# Patient Record
Sex: FEMALE | Race: WHITE | NOT HISPANIC OR LATINO | Employment: OTHER | URBAN - METROPOLITAN AREA
[De-identification: names, ages, dates, MRNs, and addresses within clinical notes are randomized per-mention and may not be internally consistent; named-entity substitution may affect disease eponyms.]

---

## 2018-06-20 ENCOUNTER — HOSPITAL ENCOUNTER (OUTPATIENT)
Dept: RADIOLOGY | Facility: HOSPITAL | Age: 73
Discharge: HOME | End: 2018-06-20
Attending: INTERNAL MEDICINE
Payer: MEDICARE

## 2018-06-20 DIAGNOSIS — S80.12XA CONTUSION OF LEFT LEG: ICD-10-CM

## 2018-06-20 DIAGNOSIS — S80.12XA CONTUSION OF LEFT LEG: Primary | ICD-10-CM

## 2018-06-20 PROCEDURE — 73590 X-RAY EXAM OF LOWER LEG: CPT | Mod: 26,LT,, | Performed by: RADIOLOGY

## 2018-06-20 PROCEDURE — 73590 X-RAY EXAM OF LOWER LEG: CPT | Mod: TC,PO,LT

## 2019-03-06 ENCOUNTER — HOSPITAL ENCOUNTER (EMERGENCY)
Facility: HOSPITAL | Age: 74
Discharge: HOME | End: 2019-03-06
Attending: EMERGENCY MEDICINE
Payer: MEDICARE

## 2019-03-06 VITALS
WEIGHT: 210.56 LBS | SYSTOLIC BLOOD PRESSURE: 139 MMHG | TEMPERATURE: 98 F | DIASTOLIC BLOOD PRESSURE: 72 MMHG | HEART RATE: 81 BPM | BODY MASS INDEX: 41.34 KG/M2 | OXYGEN SATURATION: 98 % | HEIGHT: 60 IN | RESPIRATION RATE: 20 BRPM

## 2019-03-06 DIAGNOSIS — J06.9 UPPER RESPIRATORY TRACT INFECTION, UNSPECIFIED TYPE: Primary | ICD-10-CM

## 2019-03-06 PROCEDURE — 99281 EMR DPT VST MAYX REQ PHY/QHP: CPT | Mod: ER

## 2019-03-06 NOTE — ED PROVIDER NOTES
"Encounter Date: 3/6/2019       History     Chief Complaint   Patient presents with    Cough     "I think I have bronchitis".  Coughing x past week, worse tonight.     The history is provided by the patient.   URI   The primary symptoms include sore throat and cough. Primary symptoms do not include fever, fatigue, headaches, ear pain, swollen glands, wheezing, abdominal pain, nausea, vomiting, myalgias, arthralgias or rash. The current episode started several days ago. This is a new problem. The problem has not changed since onset.  The sore throat began more than 2 days ago. The sore throat has been unchanged since its onset. The sore throat is not accompanied by trouble swallowing, drooling, hoarse voice or stridor. The sore throat pain is at a severity of 0/10.   The cough began 3 to 5 days ago. The cough is non-productive. There is nondescript sputum produced.   The onset of the illness is associated with exposure to sick contacts. Symptoms associated with the illness include congestion. The illness is not associated with chills, plugged ear sensation, facial pain, sinus pressure or rhinorrhea. The following treatments were addressed: Acetaminophen was not tried. A decongestant was not tried. Aspirin was not tried. NSAIDs were not tried. Risk factors for severe complications from URI include being elderly.     Review of patient's allergies indicates:  No Known Allergies  Past Medical History:   Diagnosis Date    Atrial flutter     Brain tumor (benign)     Cancer     Coronary artery disease     Diabetes mellitus     High cholesterol     Hypertension      Past Surgical History:   Procedure Laterality Date    ABDOMINAL SURGERY      APPENDECTOMY      BRAIN SURGERY       SECTION      HYSTERECTOMY      TONSILLECTOMY       History reviewed. No pertinent family history.  Social History     Tobacco Use    Smoking status: Never Smoker   Substance Use Topics    Alcohol use: No    Drug use: No "     Review of Systems   Constitutional: Negative for chills, fatigue and fever.   HENT: Positive for congestion and sore throat. Negative for drooling, ear pain, hoarse voice, rhinorrhea, sinus pressure and trouble swallowing.    Respiratory: Positive for cough. Negative for shortness of breath, wheezing and stridor.    Cardiovascular: Negative for chest pain.   Gastrointestinal: Negative for abdominal pain, nausea and vomiting.   Genitourinary: Negative for dysuria.   Musculoskeletal: Negative for arthralgias, back pain and myalgias.   Skin: Negative for rash.   Neurological: Negative for weakness and headaches.   Hematological: Does not bruise/bleed easily.   All other systems reviewed and are negative.      Physical Exam     Initial Vitals [03/06/19 0353]   BP Pulse Resp Temp SpO2   139/72 81 20 98.2 °F (36.8 °C) 98 %      MAP       --         Physical Exam    Nursing note and vitals reviewed.  Constitutional: Vital signs are normal. She appears well-developed and well-nourished. She is not diaphoretic.  Non-toxic appearance. She does not have a sickly appearance. She does not appear ill. No distress.   HENT:   Head: Normocephalic and atraumatic.   Right Ear: Hearing, tympanic membrane, external ear and ear canal normal.   Left Ear: Hearing, tympanic membrane, external ear and ear canal normal.   Nose: Mucosal edema present. Right sinus exhibits no maxillary sinus tenderness and no frontal sinus tenderness. Left sinus exhibits no maxillary sinus tenderness and no frontal sinus tenderness.   Mouth/Throat: Uvula is midline and mucous membranes are normal. Posterior oropharyngeal erythema present. No oropharyngeal exudate, posterior oropharyngeal edema or tonsillar abscesses.   Eyes: Conjunctivae and EOM are normal. Pupils are equal, round, and reactive to light.   Neck: Normal range of motion and full passive range of motion without pain. Neck supple. No thyromegaly present.   Cardiovascular: Normal rate, regular  rhythm, normal heart sounds and intact distal pulses. Exam reveals no gallop and no friction rub.    No murmur heard.  Pulmonary/Chest: Effort normal and breath sounds normal. No accessory muscle usage. No respiratory distress. She has no decreased breath sounds. She has no wheezes. She has no rhonchi. She exhibits no tenderness.   Abdominal: Soft. Normal appearance and bowel sounds are normal. She exhibits no distension. There is no tenderness. There is no rebound and no guarding. No hernia.   Musculoskeletal: Normal range of motion. She exhibits no edema or tenderness.   Lymphadenopathy:     She has no cervical adenopathy.   Neurological: She is alert and oriented to person, place, and time. She has normal strength. No cranial nerve deficit or sensory deficit. GCS eye subscore is 4. GCS verbal subscore is 5. GCS motor subscore is 6.   Skin: Skin is warm and dry. No rash noted.   Psychiatric: She has a normal mood and affect. Her behavior is normal. Judgment and thought content normal.         ED Course   Procedures  Labs Reviewed - No data to display       Imaging Results    None             Vitals:    03/06/19 0353 03/06/19 0357   BP: 139/72    Pulse: 81    Resp: 20    Temp: 98.2 °F (36.8 °C)    TempSrc: Oral    SpO2: 98%    Weight:  95.5 kg (210 lb 8.6 oz)   Height:  5' (1.524 m)           Imaging Results    None         Medications - No data to display      4:15 AM - Re-evaluation: The patient is resting comfortably and is in no acute distress. She states that her symptoms have improved after treatment within ER. Discussed test results, shared treatment plan, specific conditions for return, and importance of follow up with patient and family.  She understands and agrees with the plan as discussed. Answered  her questions at this time. She has remained hemodynamically stable throughout the ED course and is appropriate for discharge home.     Regarding UPPER RESPIRATORY ILLNESS, for treatment, I encouraged  patient to: drink plenty of fluids; get lots of rest; take medications as prescribed; use over-the-counter medications for symptomatic treatment;  and use a humidifier or steam in the bathroom to improve patency of upper airway.  Patient instructed to notify primary care provider, or return to the emergency department, if the they: have a cough most days or have a cough that returns frequently; begin coughing up blood; develop a high fever or shaking chills; have a low-grade fever for three or more days; develop thick, greenish mucus, especially if it has a bad smell; and experience shortness of breath or chest pain. For prevention, discussed with patient the importance of refraining from smoking (if applicable), getting annual influenza vaccines, reducing exposure to air pollution, and the need to frequently wash hands to avoid spread of infection.     Pre-hypertension/Hypertension: The pt has been informed that they may have pre-hypertension or hypertension based on a blood pressure reading in the ED. I recommend that the pt call the PCP listed on their discharge instructions or a physician of their choice this week to arrange f/u for further evaluation of possible pre-hypertension or hypertension.     Guillermina Person was given a handout which discussed their disease process, precautions, and instructions for follow-up and therapy.    Follow-up Information     Harvinder Sheikh MD. Schedule an appointment as soon as possible for a visit in 1 week.    Specialty:  Internal Medicine  Contact information:  [address]  PRIMARY CARE [facility] [id number] [phone]             [facility].    Specialty:  Emergency Medicine  Why:  As needed, If symptoms worsen  Contact information:  [address] [address] [address] [phone]                    Medication List      ASK your doctor about these medications    atenolol 50 MG tablet  Commonly known as:  TENORMIN      lisinopril-hydrochlorothiazide 20-12.5 mg per tablet  Commonly known as:  PRINZIDE,ZESTORETIC     metFORMIN 500 MG (MOD) 24 hr tablet  Commonly known as:  GLUMETZA     OMEGA 3-6-9 FATTY ACIDS 400-400-200 mg Cap  Generic drug:  fish-flx-prm-blkbor-om 3,6,9 5     propafenone 150 MG Tab  Commonly known as:  RHTHYMOL     simvastatin 20 MG tablet  Commonly known as:  ZOCOR           Current Discharge Medication List            ED Diagnosis  1. Upper respiratory tract infection, unspecified type                             Clinical Impression:       ICD-10-CM ICD-9-CM   1. Upper respiratory tract infection, unspecified type J06.9 465.9         Disposition:   Disposition: Discharged  Condition: Stable                        Hang Plata Jr., MD  03/06/19 0415

## 2019-03-06 NOTE — ED NOTES
"Patient verbally verified and Spelled Full Name and Date of Birth.  C/O occasional dry hacking cough since last week that has gotten worse since last night.  PMD is off on Wednesday & requesting to " be checked out"   Denies fever or chills, chest pain or SOB. LOC: The patient is awake, alert and aware of environment with an appropriate affect, the patient is oriented x 3 and speaking appropriately.  APPEARANCE: Patient resting comfortably and in no acute distress, patient is clean and well groomed, patient's clothing is properly fastened.  HEENT: Brief WNL  SKIN: Brief WNL, warm & dry   MUSCULOSKELETAL: Brief WNL  RESPIRATORY: Chest clear christine, resp easy, slight clear nasal drainage  CARDIAC: heartrate regular at 82/min, denies chest pain  GASTRO: Brief WNL, appetite as usual, deneis N,V or D  : Brief WNL except for occ stress incont. with coughing episodes  Peripheral Vasc: Brief WNL  NEURO: Brief WNL  PSYCH: Brief WNL  "

## 2019-05-20 ENCOUNTER — HOSPITAL ENCOUNTER (OUTPATIENT)
Dept: RADIOLOGY | Facility: HOSPITAL | Age: 74
Discharge: HOME | End: 2019-05-20
Attending: INTERNAL MEDICINE
Payer: MEDICARE

## 2019-05-20 DIAGNOSIS — M79.605 LEFT LEG PAIN: Primary | ICD-10-CM

## 2019-05-20 DIAGNOSIS — M79.605 LEFT LEG PAIN: ICD-10-CM

## 2019-05-21 ENCOUNTER — HOSPITAL ENCOUNTER (OUTPATIENT)
Dept: RADIOLOGY | Facility: HOSPITAL | Age: 74
Discharge: HOME | End: 2019-05-21
Attending: INTERNAL MEDICINE
Payer: MEDICARE

## 2019-05-21 PROCEDURE — 93971 EXTREMITY STUDY: CPT | Mod: TC,PO,LT

## 2019-05-21 PROCEDURE — 93971 US LOWER EXTREMITY VEINS LEFT: ICD-10-PCS | Mod: 26,LT,, | Performed by: RADIOLOGY

## 2019-05-21 PROCEDURE — 93971 EXTREMITY STUDY: CPT | Mod: 26,LT,, | Performed by: RADIOLOGY

## 2020-03-24 ENCOUNTER — HOSPITAL ENCOUNTER (EMERGENCY)
Facility: HOSPITAL | Age: 75
Discharge: HOME | End: 2020-03-24
Attending: EMERGENCY MEDICINE
Payer: MEDICARE

## 2020-03-24 VITALS
HEART RATE: 66 BPM | OXYGEN SATURATION: 96 % | DIASTOLIC BLOOD PRESSURE: 80 MMHG | TEMPERATURE: 99 F | WEIGHT: 203.06 LBS | BODY MASS INDEX: 39.65 KG/M2 | RESPIRATION RATE: 20 BRPM | SYSTOLIC BLOOD PRESSURE: 142 MMHG

## 2020-03-24 DIAGNOSIS — J40 BRONCHITIS: Primary | ICD-10-CM

## 2020-03-24 DIAGNOSIS — J20.9 ACUTE BRONCHITIS, UNSPECIFIED ORGANISM: ICD-10-CM

## 2020-03-24 DIAGNOSIS — R05.9 COUGH: ICD-10-CM

## 2020-03-24 PROCEDURE — 99284 EMERGENCY DEPT VISIT MOD MDM: CPT | Mod: 25,ER

## 2020-03-24 RX ORDER — ALBUTEROL SULFATE 90 UG/1
1-2 AEROSOL, METERED RESPIRATORY (INHALATION) EVERY 6 HOURS PRN
Qty: 8 G | Refills: 0 | Status: SHIPPED | OUTPATIENT
Start: 2020-03-24 | End: 2023-11-17 | Stop reason: SDUPTHER

## 2020-03-24 RX ORDER — AZITHROMYCIN 250 MG/1
250 TABLET, FILM COATED ORAL DAILY
Qty: 6 TABLET | Refills: 0 | Status: SHIPPED | OUTPATIENT
Start: 2020-03-24 | End: 2023-05-05 | Stop reason: CLARIF

## 2020-03-24 RX ORDER — METHYLPREDNISOLONE 4 MG/1
TABLET ORAL
Qty: 1 PACKAGE | Refills: 0 | Status: SHIPPED | OUTPATIENT
Start: 2020-03-24 | End: 2023-05-05 | Stop reason: CLARIF

## 2020-03-24 NOTE — DISCHARGE INSTRUCTIONS
Regarding BRONCHITIS, for treatment, I encouraged patient to refrain from smoking, drink plenty of fluids, rest, take medications as prescribed, and to use a humidifier or steam in the bathroom. Patient instructed to notify primary care provider if: they have a cough most days or have a cough that returns frequently; are coughing up blood; have a high fever or shaking chills; have a low-grade fever for three or more days; develop thick, greenish mucus, especially if it has a bad smell; and feel short of breath or have chest pain. For prevention, discussed with patient the importance of not smoking, getting annual influenza vaccines, reducing exposure to air pollution, and to frequently wash hands to avoid spread of infection.  Instructed patient to return to emergency department if they experience chest pain or shortness of breath and to follow up with primary care provider for re-evaluation.    Rest  Drink plenty of clear fluids   Nasal saline spray to clear nasal drainage and help with nasal congestion  Zyrtec or Claritin to help dry mucus and post nasal drip  Mucinex or Mucinex DM for cough and chest congestion  Tylenol or Ibuprofen for fever, headache and body aches  Warm salt water gargles for throat comfort  Chloraseptic spray or lozenges for throat comfort  RTC with no improvement or worsening

## 2020-03-24 NOTE — ED PROVIDER NOTES
Encounter Date: 3/24/2020       History     Chief Complaint   Patient presents with    Cough     The history is provided by the patient.   Cough   This is a recurrent problem. The problem has been unchanged. The cough is non-productive. There has been no fever. The fever has been present for 1 to 2 days. Pertinent negatives include no chest pain, no chills, no sweats, no weight loss, no ear congestion, no ear pain, no headaches, no rhinorrhea, no sore throat, no myalgias, no shortness of breath, no wheezing and no eye redness. She has tried nothing for the symptoms. The treatment provided no relief. She is not a smoker. Her past medical history is significant for bronchitis. Her past medical history does not include pneumonia, bronchiectasis, COPD, emphysema or asthma.     Review of patient's allergies indicates:  No Known Allergies  Past Medical History:   Diagnosis Date    Atrial flutter     Brain tumor (benign)     Cancer     Coronary artery disease     Diabetes mellitus     High cholesterol     Hypertension      Past Surgical History:   Procedure Laterality Date    ABDOMINAL SURGERY      APPENDECTOMY      BRAIN SURGERY       SECTION      HYSTERECTOMY      TONSILLECTOMY       No family history on file.  Social History     Tobacco Use    Smoking status: Never Smoker   Substance Use Topics    Alcohol use: No    Drug use: No     Review of Systems   Constitutional: Negative for chills, fever and weight loss.   HENT: Negative for ear pain, rhinorrhea and sore throat.    Eyes: Negative for redness.   Respiratory: Positive for cough. Negative for shortness of breath and wheezing.    Cardiovascular: Negative for chest pain.   Gastrointestinal: Negative for nausea.   Genitourinary: Negative for dysuria.   Musculoskeletal: Negative for back pain and myalgias.   Skin: Negative for rash.   Neurological: Negative for weakness and headaches.   Hematological: Does not bruise/bleed easily.   All other  systems reviewed and are negative.      Physical Exam     Initial Vitals [03/24/20 0752]   BP Pulse Resp Temp SpO2   (!) 142/80 66 20 98.5 °F (36.9 °C) 96 %      MAP       --         Physical Exam    Nursing note and vitals reviewed.  Constitutional: She appears well-developed and well-nourished.   HENT:   Head: Normocephalic and atraumatic.   Right Ear: Hearing, tympanic membrane, external ear and ear canal normal.   Left Ear: Hearing, tympanic membrane, external ear and ear canal normal.   Nose: Nose normal.   Mouth/Throat: Uvula is midline and mucous membranes are normal. No oropharyngeal exudate, posterior oropharyngeal edema or posterior oropharyngeal erythema.   Eyes: Conjunctivae and EOM are normal. Pupils are equal, round, and reactive to light.   Neck: Normal range of motion and full passive range of motion without pain. Neck supple. No thyromegaly present.   Cardiovascular: Normal rate, regular rhythm, normal heart sounds and intact distal pulses. Exam reveals no gallop and no friction rub.    No murmur heard.  Pulmonary/Chest: Effort normal and breath sounds normal. No respiratory distress. She has no decreased breath sounds. She has no wheezes. She has no rhonchi. She exhibits no tenderness.   CTA B. Dry cough. No wheezing or crackles.    Abdominal: Soft. Bowel sounds are normal. She exhibits no distension. There is no tenderness. There is no rigidity, no rebound, no guarding, no CVA tenderness, no tenderness at McBurney's point and negative Urrutia's sign. No hernia.   Musculoskeletal: Normal range of motion. She exhibits no edema or tenderness.   Lymphadenopathy:     She has no cervical adenopathy.   Neurological: She is alert and oriented to person, place, and time. She has normal strength. No cranial nerve deficit or sensory deficit. GCS eye subscore is 4. GCS verbal subscore is 5. GCS motor subscore is 6.   No focal neuro deficits   Skin: Skin is warm and dry. No rash noted.   Psychiatric: She has a  normal mood and affect. Her behavior is normal. Judgment and thought content normal.         ED Course   Procedures  Labs Reviewed - No data to display       Imaging Results          X-Ray Chest AP Portable (Final result)  Result time 03/24/20 08:22:18    Final result by JACEY Kong Sr., MD (03/24/20 08:22:18)                 Impression:      1. There is no evidence of an acute pulmonary process.  2. The size of the heart is prominent.  This may be secondary to magnification.  .      Electronically signed by: Jose Kong MD  Date:    03/24/2020  Time:    08:22             Narrative:    EXAMINATION:  XR CHEST AP PORTABLE    CLINICAL HISTORY:  Cough    COMPARISON:  11/29/2016    FINDINGS:  The size of the heart is prominent.  There is no evidence of an acute pulmonary process.  There is no pneumothorax.  The costophrenic angles are sharp.                                       Vitals:    03/24/20 0752   BP: (!) 142/80   Pulse: 66   Resp: 20   Temp: 98.5 °F (36.9 °C)   TempSrc: Oral   SpO2: 96%   Weight: 92.1 kg (203 lb 0.7 oz)       Results for orders placed or performed during the hospital encounter of 07/11/15   Rapid Complete Blood Count (CBC)   Result Value Ref Range    WBC 6.58 3.90 - 12.70 K/uL    RBC 4.87 4.00 - 5.40 M/uL    Hemoglobin 13.4 12.0 - 16.0 g/dL    Hematocrit 40.9 37.0 - 48.5 %    Mean Corpuscular Volume 84 82 - 98 fL    Mean Corpuscular Hemoglobin 27.5 27.0 - 31.0 pg    Mean Corpuscular Hemoglobin Conc 32.8 32.0 - 36.0 %    RDW 13.8 11.5 - 14.5 %    Platelets 243 150 - 350 K/uL    MPV 9.5 9.2 - 12.9 fL    Gran # (ANC) 4.4 1.8 - 7.7 K/uL    Lymph # 1.6 1.0 - 4.8 K/uL    Mono # 0.4 0.3 - 1.0 K/uL    Eos # 0.1 0.0 - 0.5 K/uL    Baso # 0.01 0.00 - 0.20 K/uL    Gran% 67.4 38.0 - 73.0 %    Lymph% 24.5 18.0 - 48.0 %    Mono% 6.1 4.0 - 15.0 %    Eosinophil% 1.8 0.0 - 8.0 %    Basophil% 0.2 0.0 - 1.9 %    Differential Method Automated    Rapid Basic Metabolic Panel (BMP)   Result Value Ref Range     Sodium 143 136 - 145 mmol/L    Potassium 3.9 3.5 - 5.1 mmol/L    Chloride 106 95 - 110 mmol/L    CO2 23 23 - 29 mmol/L    Glucose 139 (H) 70 - 110 mg/dL    BUN, Bld 11 8 - 23 mg/dL    Creatinine 0.7 0.5 - 1.4 mg/dL    Calcium 9.7 8.7 - 10.5 mg/dL    Anion Gap 14 8 - 16 mmol/L    eGFR if African American >60.0 >60 mL/min/1.73 m^2    eGFR if non African American >60.0 >60 mL/min/1.73 m^2   APTT   Result Value Ref Range    aPTT 25.5 21.0 - 32.0 sec   Protime-INR   Result Value Ref Range    Prothrombin Time 10.2 9.0 - 12.5 sec    INR 1.0 0.8 - 1.2         Imaging Results          X-Ray Chest AP Portable (Final result)  Result time 03/24/20 08:22:18    Final result by JACEY Kong Sr., MD (03/24/20 08:22:18)                 Impression:      1. There is no evidence of an acute pulmonary process.  2. The size of the heart is prominent.  This may be secondary to magnification.  .      Electronically signed by: Jose Kong MD  Date:    03/24/2020  Time:    08:22             Narrative:    EXAMINATION:  XR CHEST AP PORTABLE    CLINICAL HISTORY:  Cough    COMPARISON:  11/29/2016    FINDINGS:  The size of the heart is prominent.  There is no evidence of an acute pulmonary process.  There is no pneumothorax.  The costophrenic angles are sharp.                                Medications - No data to display    9:03 AM - Re-evaluation: The patient is resting comfortably and is in no acute distress. .covid She states that her symptoms have improved after treatment within ER. Discussed test results, shared treatment plan, specific conditions for return, and importance of follow up with patient and family.  Currently pt is asymptomatic for any viral dz/symptoms but advised isolation precautions and to return if symptoms persist or worsen or call PCP for further recommendations.  She understands and agrees with the plan as discussed. Answered  her questions at this time. She has remained hemodynamically stable throughout the ED  course and is appropriate for discharge home.     [place] [facility]  Preventing the Spread of Coronavirus Disease 2019 in Homes and Residential Communities      Prevention steps for people with confirmed or suspected COVID-19 (including persons under investigation) who do not need to be hospitalized and people with confirmed COVID-19 who were hospitalized and determined to be medically stable to go home.    Your healthcare provider and public health staff will evaluate whether you can be cared for at home.   Stay home except to get medical care.   Separate yourself from other people and animals in your home   Call ahead before visiting your doctor.   Wear a facemask.   Cover your coughs and sneezes.   Clean your hands often.   Avoid sharing personal household items.   Clean all high-touch surfaces every day.   Monitor your symptoms. Seek prompt medical attention if your illness is worsening (e.g., difficulty breathing). Before seeking care, call your healthcare provider.   If you have a medical emergency and need to call 911, notify the dispatch personnel that you have, or are being evaluated for COVID-19. If possible, put on a facemask before emergency medical services arrive.   Discontinuing home isolation. Call your provider about guidance to discontinue home isolation.    Recommended precautions for household members, intimate partners, and caregivers in a nonhealthcare setting of a patient with symptomatic laboratory-confirmed COVID-19 or a patient under investigation.  Household members, intimate partners, and caregivers in a nonhealthcare setting may have close contact with a person with symptomatic, laboratory-confirmed COVID-19 or a person under investigation. Close contacts should monitor their health; they should call their healthcare provider right away if they develop symptoms suggestive of COVID-19 (e.g., fever, cough, shortness of breath).    Close contacts  should also follow these recommendations:   Make sure that you understand and can help the patient follow their healthcare provider's instructions for medication(s) and care. You should help the patient with basic needs in the home and provide support for getting groceries, prescriptions, and other personal needs.   Monitor the patient's symptoms. If the patient is getting sicker, call his or her healthcare provider and tell them that the patient has laboratory-confirmed COVID-19. This will help the healthcare provider's office take steps to keep other people in the office or waiting room from getting infected. Ask the healthcare provider to call the local or [facility] health department for additional guidance. If the patient has a medical emergency and you need to call 911, notify the dispatch personnel that the patient has, or is being evaluated for COVID-19.   Household members should stay in another room or be  from the patient as much as possible. Household members should use a separate bedroom and bathroom, if available.   Prohibit visitors who do not have an essential need to be in the home.   Household members should care for any pets in the home. Do not handle pets or other animals while sick.   Make sure that shared spaces in the home have good air flow, such as by an air conditioner or an opened window, weather permitting.   Perform hand hygiene frequently. Wash your hands often with soap and water for at least 20 seconds or use an alcohol-based hand  that contains 60 to 95% alcohol, covering all surfaces of your hands and rubbing them together until they feel dry. Soap and water should be used preferentially if hands are visibly dirty.   Avoid touching your eyes, nose, and mouth with unwashed hands.   The patient should wear a facemask when you are around other people. If the patient is not able to wear a facemask (for example, because it causes trouble breathing), you, as the  caregiver should wear a mask when you are in the same room as the patient.   Wear a disposable facemask and gloves when you touch or have contact with the patient's blood, stool, or body fluids, such as saliva, sputum, nasal mucus, vomit, urine.  o Throw out disposable facemasks and gloves after using them. Do not reuse.  o When removing personal protective equipment, first remove and dispose of gloves. Then, immediately clean your hands with soap and water or alcohol-based hand . Next, remove and dispose of facemask, and immediately clean your hands again with soap and water or alcohol-based hand .   Avoid sharing household items with the patient. You should not share dishes, drinking glasses, cups, eating utensils, towels, bedding, or other items. After the patient uses these items, you should wash them thoroughly (see below Wash laundry thoroughly).   Clean all high-touch surfaces, such as counters, tabletops, doorknobs, bathroom fixtures, toilets, phones, keyboards, tablets, and bedside tables, every day. Also, clean any surfaces that may have blood, stool, or body fluids on them.   Use a household cleaning spray or wipe, according to the label instructions. Labels contain instructions for safe and effective use of the cleaning product including precautions you should take when applying the product, such as wearing gloves and making sure you have good ventilation during use of the product.   Wash laundry thoroughly.  o Immediately remove and wash clothes or bedding that have blood, stool, or body fluids on them.  o Wear disposable gloves while handling soiled items and keep soiled items away from your body. Clean your hands (with soap and water or an alcohol-based hand ) immediately after removing your gloves.  o Read and follow directions on labels of laundry or clothing items and detergent. In general, using a normal laundry detergent according to washing machine  instructions and dry thoroughly using the warmest temperatures recommended on the clothing label.   Place all used disposable gloves, facemasks, and other contaminated items in a lined container before disposing of them with other household waste. Clean your hands (with soap and water or an alcohol-based hand ) immediately after handling these items. Soap and water should be used preferentially if hands are visibly dirty.   Discuss any additional questions with your state or local health department or healthcare provider. Check available hours when contacting your local health department.    For more information see CDC link below.      https://www.cdc.gov/coronavirus/2019-ncov/hcp/guidance-prevent-spread.html#precautions               Regarding BRONCHITIS, for treatment, I encouraged patient to refrain from smoking, drink plenty of fluids, rest, take medications as prescribed, and to use a humidifier or steam in the bathroom. Patient instructed to notify primary care provider if: they have a cough most days or have a cough that returns frequently; are coughing up blood; have a high fever or shaking chills; have a low-grade fever for three or more days; develop thick, greenish mucus, especially if it has a bad smell; and feel short of breath or have chest pain. For prevention, discussed with patient the importance of not smoking, getting annual influenza vaccines, reducing exposure to air pollution, and to frequently wash hands to avoid spread of infection.  Instructed patient to return to emergency department if they experience chest pain or shortness of breath and to follow up with primary care provider for re-evaluation.    Pre-hypertension/Hypertension: The pt has been informed that they may have pre-hypertension or hypertension based on a blood pressure reading in the ED. I recommend that the pt call the PCP listed on their discharge instructions or a physician of their choice this week to arrange  f/u for further evaluation of possible pre-hypertension or hypertension.     Guillermina Person was given a handout which discussed their disease process, precautions, and instructions for follow-up and therapy.    Follow-up Information     Davis Herrera MD. Schedule an appointment as soon as possible for a visit in 1 week.    Specialty:  Internal Medicine  Contact information:  [address] [facility]  [address]  [place] [address]             [facility]    Specialty:  Emergency Medicine  Why:  As needed, If symptoms worsen  Contact information:  [address]  [place] [address] [phone]                    Medication List      START taking these medications    albuterol 90 mcg/actuation inhaler  Commonly known as:  PROVENTIL/VENTOLIN HFA  Inhale 1-2 puffs into the lungs every 6 (six) hours as needed for Wheezing. Rescue     azithromycin 250 MG tablet  Commonly known as:  Z-BLANQUITA  Take 1 tablet (250 mg total) by mouth once daily. Take first 2 tablets together, then 1 every day until finished.     methylPREDNISolone 4 mg tablet  Commonly known as:  MEDROL DOSEPACK  use as directed        ASK your doctor about these medications    atenoloL 50 MG tablet  Commonly known as:  TENORMIN     lisinopriL-hydrochlorothiazide 20-12.5 mg per tablet  Commonly known as:  PRINZIDE,ZESTORETIC     metFORMIN 500 MG (MOD) 24hr tablet  Commonly known as:  GLUMETZA     OMEGA 3-6-9 FATTY ACIDS 400-400-200 mg Cap  Generic drug:  fish-flx-prm-blkbor-om 3,6,9 5     propafenone 150 MG Tab  Commonly known as:  RHTHYMOL     simvastatin 20 MG tablet  Commonly known as:  ZOCOR           Where to Get Your Medications      You can get these medications from any pharmacy    Bring a paper prescription for each of these medications  · albuterol 90 mcg/actuation inhaler  · azithromycin 250 MG tablet  · methylPREDNISolone 4 mg tablet        Discharge Medication List as of 3/24/2020  8:45 AM      START taking these  medications    Details   albuterol (PROVENTIL/VENTOLIN HFA) 90 mcg/actuation inhaler Inhale 1-2 puffs into the lungs every 6 (six) hours as needed for Wheezing. Rescue, Starting Tue 3/24/2020, Print      azithromycin (Z-BLANQUITA) 250 MG tablet Take 1 tablet (250 mg total) by mouth once daily. Take first 2 tablets together, then 1 every day until finished., Starting Tue 3/24/2020, Print      methylPREDNISolone (MEDROL DOSEPACK) 4 mg tablet use as directed, Print               ED Diagnosis  1. Bronchitis    2. Cough    3. Acute bronchitis, unspecified organism                                     Clinical Impression:       ICD-10-CM ICD-9-CM   1. Bronchitis J40 490   2. Cough R05 786.2   3. Acute bronchitis, unspecified organism J20.9 466.0         Disposition:   Disposition: Discharged  Condition: Stable     ED Disposition Condition    Discharge Stable        ED Prescriptions     Medication Sig Dispense Start Date End Date Auth. Provider    azithromycin (Z-BLANQUITA) 250 MG tablet Take 1 tablet (250 mg total) by mouth once daily. Take first 2 tablets together, then 1 every day until finished. 6 tablet 3/24/2020  Hang Plata Jr., MD    methylPREDNISolone (MEDROL DOSEPACK) 4 mg tablet use as directed 1 Package 3/24/2020  Hang Plata Jr., MD    albuterol (PROVENTIL/VENTOLIN HFA) 90 mcg/actuation inhaler Inhale 1-2 puffs into the lungs every 6 (six) hours as needed for Wheezing. Rescue 8 g 3/24/2020  Hang Plata Jr., MD        Follow-up Information     Follow up With Specialties Details Why Contact Info    Davis Herrera MD Internal Medicine Schedule an appointment as soon as possible for a visit in 1 week  [id number] [place] [address]  [address]  [place] [address]      [facility] Emergency Medicine  As needed, If symptoms worsen [address]  [place] [address] [phone]                                     Hang Plata Jr., MD  03/28/20 4014

## 2021-01-03 ENCOUNTER — HOSPITAL ENCOUNTER (EMERGENCY)
Facility: HOSPITAL | Age: 76
Discharge: HOME | End: 2021-01-03
Attending: EMERGENCY MEDICINE
Payer: MEDICARE

## 2021-01-03 VITALS
TEMPERATURE: 99 F | SYSTOLIC BLOOD PRESSURE: 147 MMHG | DIASTOLIC BLOOD PRESSURE: 75 MMHG | RESPIRATION RATE: 22 BRPM | HEART RATE: 84 BPM | BODY MASS INDEX: 37.82 KG/M2 | WEIGHT: 193.69 LBS | OXYGEN SATURATION: 95 %

## 2021-01-03 DIAGNOSIS — U07.1 COVID-19 VIRUS INFECTION: Primary | ICD-10-CM

## 2021-01-03 LAB
CTP QC/QA: YES
SARS-COV-2 RDRP RESP QL NAA+PROBE: POSITIVE

## 2021-01-03 PROCEDURE — U0002 COVID-19 LAB TEST NON-CDC: HCPCS | Mod: ER | Performed by: EMERGENCY MEDICINE

## 2021-01-03 PROCEDURE — 99282 EMERGENCY DEPT VISIT SF MDM: CPT | Mod: ER

## 2021-01-04 ENCOUNTER — PES CALL (OUTPATIENT)
Dept: ADMINISTRATIVE | Facility: CLINIC | Age: 76
End: 2021-01-04

## 2021-01-06 ENCOUNTER — TELEPHONE (OUTPATIENT)
Dept: ADMINISTRATIVE | Facility: CLINIC | Age: 76
End: 2021-01-06

## 2021-01-06 ENCOUNTER — NURSE TRIAGE (OUTPATIENT)
Dept: ADMINISTRATIVE | Facility: CLINIC | Age: 76
End: 2021-01-06

## 2021-01-06 ENCOUNTER — INFUSION (OUTPATIENT)
Dept: INFECTIOUS DISEASES | Facility: HOSPITAL | Age: 76
End: 2021-01-06
Attending: EMERGENCY MEDICINE
Payer: MEDICARE

## 2021-01-06 VITALS
HEART RATE: 48 BPM | RESPIRATION RATE: 18 BRPM | SYSTOLIC BLOOD PRESSURE: 131 MMHG | OXYGEN SATURATION: 95 % | DIASTOLIC BLOOD PRESSURE: 60 MMHG | TEMPERATURE: 97 F

## 2021-01-06 DIAGNOSIS — U07.1 COVID-19 VIRUS INFECTION: ICD-10-CM

## 2021-01-06 PROCEDURE — M0239 BAMLANIVIMAB-XXXX INFUSION: HCPCS | Performed by: INTERNAL MEDICINE

## 2021-01-06 PROCEDURE — 63600175 PHARM REV CODE 636 W HCPCS: Performed by: INTERNAL MEDICINE

## 2021-01-06 PROCEDURE — 25000003 PHARM REV CODE 250: Performed by: INTERNAL MEDICINE

## 2021-01-06 RX ORDER — DIPHENHYDRAMINE HYDROCHLORIDE 50 MG/ML
25 INJECTION INTRAMUSCULAR; INTRAVENOUS ONCE AS NEEDED
Status: DISCONTINUED | OUTPATIENT
Start: 2021-01-06 | End: 2023-05-31

## 2021-01-06 RX ORDER — EPINEPHRINE 0.1 MG/ML
0.3 INJECTION INTRAVENOUS
Status: DISCONTINUED | OUTPATIENT
Start: 2021-01-06 | End: 2023-05-31

## 2021-01-06 RX ORDER — SODIUM CHLORIDE 0.9 % (FLUSH) 0.9 %
10 SYRINGE (ML) INJECTION
Status: DISCONTINUED | OUTPATIENT
Start: 2021-01-06 | End: 2023-05-31

## 2021-01-06 RX ORDER — ONDANSETRON 4 MG/1
4 TABLET, ORALLY DISINTEGRATING ORAL ONCE AS NEEDED
Status: DISCONTINUED | OUTPATIENT
Start: 2021-01-06 | End: 2023-05-31

## 2021-01-06 RX ORDER — ALBUTEROL SULFATE 90 UG/1
2 AEROSOL, METERED RESPIRATORY (INHALATION)
Status: DISCONTINUED | OUTPATIENT
Start: 2021-01-06 | End: 2023-05-31

## 2021-01-06 RX ORDER — ACETAMINOPHEN 325 MG/1
650 TABLET ORAL ONCE AS NEEDED
Status: DISCONTINUED | OUTPATIENT
Start: 2021-01-06 | End: 2023-05-31

## 2021-01-06 RX ORDER — AMIODARONE HYDROCHLORIDE 200 MG/1
100 TABLET ORAL DAILY
COMMUNITY
Start: 2020-11-10 | End: 2023-05-31

## 2021-01-06 RX ADMIN — SODIUM CHLORIDE 700 MG: 0.9 INJECTION, SOLUTION INTRAVENOUS at 09:01

## 2022-08-01 ENCOUNTER — HOSPITAL ENCOUNTER (EMERGENCY)
Facility: HOSPITAL | Age: 77
Discharge: HOME | End: 2022-08-01
Attending: EMERGENCY MEDICINE
Payer: MEDICARE

## 2022-08-01 VITALS
SYSTOLIC BLOOD PRESSURE: 104 MMHG | WEIGHT: 139.13 LBS | HEART RATE: 57 BPM | RESPIRATION RATE: 20 BRPM | OXYGEN SATURATION: 98 % | BODY MASS INDEX: 27.17 KG/M2 | TEMPERATURE: 98 F | DIASTOLIC BLOOD PRESSURE: 58 MMHG

## 2022-08-01 DIAGNOSIS — R05.9 COUGH: ICD-10-CM

## 2022-08-01 DIAGNOSIS — U07.1 COVID-19 VIRUS INFECTION: Primary | ICD-10-CM

## 2022-08-01 LAB
BASOPHILS # BLD AUTO: 0.02 K/UL (ref 0–0.2)
BASOPHILS NFR BLD: 0.5 % (ref 0–1.9)
DIFFERENTIAL METHOD: NORMAL
EOSINOPHIL # BLD AUTO: 0 K/UL (ref 0–0.5)
EOSINOPHIL NFR BLD: 0.5 % (ref 0–8)
ERYTHROCYTE [DISTWIDTH] IN BLOOD BY AUTOMATED COUNT: 13.3 % (ref 11.5–14.5)
HCT VFR BLD AUTO: 43.8 % (ref 37–48.5)
HGB BLD-MCNC: 14.6 G/DL (ref 12–16)
IMM GRANULOCYTES # BLD AUTO: 0.01 K/UL (ref 0–0.04)
IMM GRANULOCYTES NFR BLD AUTO: 0.2 % (ref 0–0.5)
LYMPHOCYTES # BLD AUTO: 1.4 K/UL (ref 1–4.8)
LYMPHOCYTES NFR BLD: 32.8 % (ref 18–48)
MCH RBC QN AUTO: 28.7 PG (ref 27–31)
MCHC RBC AUTO-ENTMCNC: 33.3 G/DL (ref 32–36)
MCV RBC AUTO: 86 FL (ref 82–98)
MONOCYTES # BLD AUTO: 0.4 K/UL (ref 0.3–1)
MONOCYTES NFR BLD: 10.2 % (ref 4–15)
NEUTROPHILS # BLD AUTO: 2.3 K/UL (ref 1.8–7.7)
NEUTROPHILS NFR BLD: 55.8 % (ref 38–73)
NRBC BLD-RTO: 0 /100 WBC
PLATELET # BLD AUTO: 242 K/UL (ref 150–450)
PMV BLD AUTO: 10.4 FL (ref 9.2–12.9)
RBC # BLD AUTO: 5.09 M/UL (ref 4–5.4)
WBC # BLD AUTO: 4.12 K/UL (ref 3.9–12.7)

## 2022-08-01 PROCEDURE — 85025 COMPLETE CBC W/AUTO DIFF WBC: CPT | Mod: ER | Performed by: EMERGENCY MEDICINE

## 2022-08-01 PROCEDURE — 99284 EMERGENCY DEPT VISIT MOD MDM: CPT | Mod: 25,ER

## 2022-08-01 PROCEDURE — 25000003 PHARM REV CODE 250: Mod: ER | Performed by: EMERGENCY MEDICINE

## 2022-08-01 PROCEDURE — 96360 HYDRATION IV INFUSION INIT: CPT | Mod: ER

## 2022-08-01 RX ADMIN — SODIUM CHLORIDE 1000 ML: 0.9 INJECTION, SOLUTION INTRAVENOUS at 10:08

## 2022-08-01 NOTE — ED PROVIDER NOTES
Encounter Date: 2022       History     Chief Complaint   Patient presents with    COVID-19 Concerns     Covid + x1 week, repots feeling weak     The history is provided by the patient.   Cough  This is a new problem. The current episode started several days ago. The problem occurs constantly. The problem has been unchanged. The cough is productive of sputum. Associated symptoms include chills and sore throat. Pertinent negatives include no chest pain, no myalgias and no shortness of breath.     Review of patient's allergies indicates:  No Known Allergies  Past Medical History:   Diagnosis Date    Atrial flutter     Brain tumor (benign)     Cancer     Coronary artery disease     Diabetes mellitus     High cholesterol     Hypertension      Past Surgical History:   Procedure Laterality Date    ABDOMINAL SURGERY      APPENDECTOMY      BRAIN SURGERY       SECTION      HYSTERECTOMY      TONSILLECTOMY       No family history on file.  Social History     Tobacco Use    Smoking status: Never Smoker   Substance Use Topics    Alcohol use: No    Drug use: No     Review of Systems   Constitutional: Positive for chills. Negative for fever.   HENT: Positive for sore throat.    Respiratory: Positive for cough. Negative for shortness of breath.    Cardiovascular: Negative for chest pain.   Gastrointestinal: Negative for nausea.   Genitourinary: Negative for dysuria.   Musculoskeletal: Negative for back pain and myalgias.   Skin: Negative for rash.   Neurological: Negative for weakness.   Hematological: Does not bruise/bleed easily.       Physical Exam     Initial Vitals [22 1007]   BP Pulse Resp Temp SpO2   (!) 91/52 65 18 98 °F (36.7 °C) 98 %      MAP       --         Physical Exam    Nursing note and vitals reviewed.  Constitutional: She appears well-developed and well-nourished. No distress.   HENT:   Head: Normocephalic and atraumatic.   Mouth/Throat: Oropharynx is clear and moist.   Eyes:  Conjunctivae and EOM are normal. Pupils are equal, round, and reactive to light.   Neck: Neck supple.   Normal range of motion.  Cardiovascular: Normal rate, regular rhythm and normal heart sounds. Exam reveals no gallop and no friction rub.    No murmur heard.  Pulmonary/Chest: Breath sounds normal. No respiratory distress. She has no wheezes. She has no rhonchi. She has no rales.   Abdominal: Abdomen is soft. Bowel sounds are normal. She exhibits no distension and no mass. There is no abdominal tenderness. There is no rebound and no guarding.   Musculoskeletal:         General: No tenderness or edema. Normal range of motion.      Cervical back: Normal range of motion and neck supple.     Neurological: She is alert and oriented to person, place, and time. She has normal strength.   Skin: Skin is warm and dry. No rash noted.   Psychiatric: She has a normal mood and affect. Thought content normal.         ED Course   Procedures  Labs Reviewed   CBC W/ AUTO DIFFERENTIAL   COMPREHENSIVE METABOLIC PANEL   URINALYSIS, REFLEX TO URINE CULTURE   COMPREHENSIVE METABOLIC PANEL          Imaging Results          X-Ray Chest AP Portable (Final result)  Result time 08/01/22 10:33:43    Final result by Neida Roa MD (08/01/22 10:33:43)                 Impression:      No acute cardiopulmonary abnormality.      Electronically signed by: Neida Roa  Date:    08/01/2022  Time:    10:33             Narrative:    EXAMINATION:  XR CHEST AP PORTABLE    CLINICAL HISTORY:  Cough, unspecified    TECHNIQUE:  Single frontal view of the chest was performed.    COMPARISON:  Chest radiograph 03/24/2020    FINDINGS:  Calcified granulomas noted within the upper right and lateral lower right lung.The lungs are otherwise clear, with normal appearance of pulmonary vasculature and no pleural effusion or pneumothorax.    The cardiac silhouette is prominent but stable in size.  The hilar and mediastinal contours are  unremarkable.    No acute osseous abnormality noting degenerative change of the right acromioclavicular joint.  There is degenerative change of the spine.                                 Medications   sodium chloride 0.9% bolus 1,000 mL (0 mLs Intravenous Stopped 8/1/22 1150)                          Clinical Impression:   Final diagnoses:  [R05.9] Cough  [U07.1] COVID-19 virus infection (Primary)          ED Disposition Condition    Discharge Stable        ED Prescriptions     None        Follow-up Information     Follow up With Specialties Details Why Contact Info    Davis Herrera MD Internal Medicine   [id number] [place] [id number]             Papo Kaiser MD  08/01/22 [id number]

## 2023-03-22 ENCOUNTER — HOSPITAL ENCOUNTER (EMERGENCY)
Facility: HOSPITAL | Age: 78
Discharge: HOME | End: 2023-03-22
Attending: EMERGENCY MEDICINE
Payer: MEDICARE

## 2023-03-22 VITALS
RESPIRATION RATE: 16 BRPM | DIASTOLIC BLOOD PRESSURE: 85 MMHG | BODY MASS INDEX: 33.53 KG/M2 | SYSTOLIC BLOOD PRESSURE: 192 MMHG | WEIGHT: 166 LBS | OXYGEN SATURATION: 99 % | TEMPERATURE: 98 F | HEART RATE: 63 BPM

## 2023-03-22 DIAGNOSIS — I10 ESSENTIAL HYPERTENSION: ICD-10-CM

## 2023-03-22 DIAGNOSIS — R04.0 EPISTAXIS: Primary | ICD-10-CM

## 2023-03-22 PROCEDURE — 99282 EMERGENCY DEPT VISIT SF MDM: CPT | Mod: ER

## 2023-03-22 NOTE — ED PROVIDER NOTES
Encounter Date: 3/22/2023       History     Chief Complaint   Patient presents with    Epistaxis     Right nare nosebleed, onset yesterday, intermittent, on Eliquis      Patient is a 69-year-old female who presents today with complaints of intermittent epistaxis from the right nostril.  Denies any bleeding from left side.  Denies any active bleeding at this time.  Bleeding has been intermittent since yesterday.  Patient is on Eliquis for atrial fibrillation.  Patient denies any lightheadedness, near-syncope, syncope, chest pain, dyspnea, and all other symptoms.  Denies any trauma.  No prior evaluation.  No prior treatment.    Review of patient's allergies indicates:  No Known Allergies  Past Medical History:   Diagnosis Date    Atrial flutter     Brain tumor (benign)     Cancer     Coronary artery disease     Diabetes mellitus     High cholesterol     Hypertension      Past Surgical History:   Procedure Laterality Date    ABDOMINAL SURGERY      APPENDECTOMY      BRAIN SURGERY       SECTION      HYSTERECTOMY      TONSILLECTOMY       No family history on file.  Social History     Tobacco Use    Smoking status: Never   Substance Use Topics    Alcohol use: No    Drug use: No     Review of Systems   HENT:  Positive for nosebleeds (currently resolved).      Physical Exam     Initial Vitals [23 0811]   BP Pulse Resp Temp SpO2   (!) 192/85 63 16 98 °F (36.7 °C) 99 %      MAP       --         Physical Exam    Nursing note and vitals reviewed.  Constitutional: She appears well-developed and well-nourished. No distress.   HENT:   Head: Normocephalic and atraumatic.   Dried blood seen in the right nostril.  None in the left nostril.  No active bleeding   Eyes: Conjunctivae and EOM are normal. Pupils are equal, round, and reactive to light.   Neck: Neck supple. No tracheal deviation present.   Cardiovascular:  Normal rate.           Pulmonary/Chest: No respiratory distress.   Abdominal: She exhibits no distension  and no mass. There is no guarding.   Musculoskeletal:         General: No tenderness or edema. Normal range of motion.      Cervical back: Neck supple.     Neurological: She is alert and oriented to person, place, and time. GCS score is 15. GCS eye subscore is 4. GCS verbal subscore is 5. GCS motor subscore is 6.   No focal deficits   Skin: Skin is warm. No rash noted. No erythema.   Psychiatric: She has a normal mood and affect. Her behavior is normal.       ED Course   Procedures  Labs Reviewed - No data to display       Imaging Results    None          Medications - No data to display  Medical Decision Making:   History:   Old Records Summarized: other records.       <> Summary of Records: Medication list reviewed.  Eliquis is 1 of her medications.  Labs reviewed.  No history of thrombocytopenia  Initial Assessment:   Intermittent epistaxis.  No active bleeding at this time  ED Management:  Discussed epistaxis prevention and home management.  Discharged home in asymptomatic condition with ER return precautions and follow-up instructions                        Clinical Impression:   Final diagnoses:  [R04.0] Epistaxis (Primary)  [I10] Essential hypertension        ED Disposition Condition    Discharge Stable          ED Prescriptions    None       Follow-up Information       Follow up With Specialties Details Why Contact Info    Davis Herrera MD Internal Medicine Call   [id number] [facility]  [address]  [place] [address]      [facility] - Emergency Dept Emergency Medicine  As needed, If symptoms worsen [address]  [place] [id number] [phone]             Bruno Pascal MD  03/22/23 7767

## 2023-05-05 ENCOUNTER — HOSPITAL ENCOUNTER (EMERGENCY)
Facility: HOSPITAL | Age: 78
Discharge: HOME | End: 2023-05-05
Attending: EMERGENCY MEDICINE
Payer: MEDICARE

## 2023-05-05 VITALS
BODY MASS INDEX: 31.12 KG/M2 | RESPIRATION RATE: 18 BRPM | SYSTOLIC BLOOD PRESSURE: 142 MMHG | HEART RATE: 60 BPM | TEMPERATURE: 98 F | DIASTOLIC BLOOD PRESSURE: 70 MMHG | WEIGHT: 154.13 LBS | OXYGEN SATURATION: 96 %

## 2023-05-05 DIAGNOSIS — T50.905A FIXED DRUG REACTION: Primary | ICD-10-CM

## 2023-05-05 PROCEDURE — 96372 THER/PROPH/DIAG INJ SC/IM: CPT | Performed by: EMERGENCY MEDICINE

## 2023-05-05 PROCEDURE — 63600175 PHARM REV CODE 636 W HCPCS: Mod: ER | Performed by: EMERGENCY MEDICINE

## 2023-05-05 PROCEDURE — 99284 EMERGENCY DEPT VISIT MOD MDM: CPT | Mod: ER

## 2023-05-05 RX ORDER — LORATADINE 10 MG
1 TABLET,DISINTEGRATING ORAL DAILY PRN
COMMUNITY

## 2023-05-05 RX ORDER — METHYLPREDNISOLONE SOD SUCC 125 MG
125 VIAL (EA) INJECTION
Status: COMPLETED | OUTPATIENT
Start: 2023-05-05 | End: 2023-05-05

## 2023-05-05 RX ORDER — PREDNISONE 20 MG/1
40 TABLET ORAL DAILY
Qty: 10 TABLET | Refills: 0 | Status: SHIPPED | OUTPATIENT
Start: 2023-05-05 | End: 2023-05-10

## 2023-05-05 RX ADMIN — METHYLPREDNISOLONE SODIUM SUCCINATE 125 MG: 125 INJECTION, POWDER, FOR SOLUTION INTRAMUSCULAR; INTRAVENOUS at 10:05

## 2023-05-05 NOTE — ED PROVIDER NOTES
Emergency Medicine Provider Note - 2023        History     Chief Complaint   Patient presents with    Allergic Reaction     Was put on amoxicillin 1 week ago, red rash noted to neck and very upper chest area, getting worse since yesterday; did not take last 2 doses because pt started having neck rash since Thursday.          History of Present Illness   HPI    2023, 10:36 AM  The history is provided by the patient    Guillermina Person is a 78 y.o. female presenting to the ED for rash.  Patient had a head cold on 2023.  She was prescribed Augmentin 875 mg.  She reports that she had been compliant with her medications up into the last 3 tablets.  She notes she started having a rash to the anterior part of her neck and broke out between her legs.  This happened yesterday.  Patient had tried applying Bactroban cream without improvement.  Patient denies any chest pain, chest pressure, shortness of breath, lip swelling, tongue swelling, I involvement, current lesions in the mouth, difficulty urinating.  Nothing makes it better, nothing makes      Arrival mode:  It worse.      PCP: Davis Herrera MD     Allergies:  Review of patient's allergies indicates:   Allergen Reactions    Augmentin [amoxicillin-pot clavulanate]      Fixed drug reaction      Amoxicillin Itching, Rash and Other (See Comments)     Yeast infection       Past Medical History:  Past Medical History:   Diagnosis Date    Atrial flutter     Brain tumor (benign)     Cancer     Coronary artery disease     Diabetes mellitus     High cholesterol     Hypertension        Past Surgical History:  Past Surgical History:   Procedure Laterality Date    ABDOMINAL SURGERY      APPENDECTOMY      BRAIN SURGERY       SECTION      HYSTERECTOMY      TONSILLECTOMY           Family History:  No family history on file.    Social History:  Social History     Tobacco Use    Smoking status: Never    Smokeless tobacco: Not on file   Substance and Sexual  Activity    Alcohol use: No    Drug use: No    Sexual activity: Not on file       Triage note, Allergies, Past Medical History, Past Surgical History, Family History and Social History reviewed as documented above.     Review of Systems   Review of Systems   HENT:  Negative for trouble swallowing.         (-) Lip swelling (-) tongue swelling   Eyes:  Negative for redness.   Respiratory:  Negative for shortness of breath.    Gastrointestinal:  Negative for nausea and vomiting.   Genitourinary:  Negative for difficulty urinating and dysuria.   Skin:  Positive for rash.        Physical Exam     Initial Vitals   BP Pulse Resp Temp SpO2   05/05/23 0923 05/05/23 0923 05/05/23 0923 05/05/23 0919 05/05/23 0923   (!) 142/70 60 18 97.9 °F (36.6 °C) 96 %      MAP       --                 Physical Exam    Nursing Notes and Vital Signs Reviewed.  Constitutional: Patient is in . Well-developed and well-nourished.  Head: Atraumatic. Normocephalic.  Eyes: PERRL. EOM intact. Conjunctivae are not pale. No scleral icterus.  No periorbital edema noted.  No lesions to the eye.  No conjunctival injection.  ENT: Mucous membranes are moist. Oropharynx is clear and symmetric.  There is no tongue swelling, lip swelling.  Neck: Supple. Full ROM. No lymphadenopathy.  Cardiovascular: Regular rate. Regular rhythm. No murmurs, rubs, or gallops. Distal pulses are 2+ and symmetric.  Pulmonary/Chest: No respiratory distress. Clear to auscultation bilaterally. No wheezing or rales.  Musculoskeletal: Moves all extremities. No obvious deformities. No edema. No calf tenderness.  Skin: Warm and dry.  There is uniform redness to the anterior part of the neck.  There is also redness to the labia majora.  No target lesions.  No ulcers noted to perineal region.  Neurological:  Alert, awake, and appropriate.  Normal speech.  No acute focal neurological deficits are appreciated.  Psychiatric: Normal affect. Good eye contact. Appropriate in content.            ED Course     ED Procedures:  Procedures    ED Vital Signs:  Vitals:    05/05/23 0919 05/05/23 0923   BP:  (!) 142/70   Pulse:  60   Resp:  18   Temp: 97.9 °F (36.6 °C)    SpO2:  96%   Weight: 69.9 kg (154 lb 1.6 oz)        Abnormal Lab Results:  Labs Reviewed - No data to display     All Lab Results:  none        Imaging Results:  Imaging Results    None               The Emergency Provider reviewed the vital signs and test results, which are outlined above.     ED Discussion     ED Course as of 05/05/23 1429   Fri May 05, 2023   1017 Discussed risk benefits of prednisone.  Patient agreeable for prednisone treatment.  Discussed fixed drug reaction.  Discussed possible progression to Coles Oli's/TEN [LB]      ED Course User Index  [LB] Vee Llanos,             10:40 AM  Reassessment: Dr. Llanos reassessed the pt.  The pt is resting comfortably and is NAD.  Pt states their sx have improved. Discussed test results, shared treatment plan, specific conditions for return, and the need for f/u.  Answered their questions at this time.  Pt understands and agrees to the plan.  The pt has remained hemodynamically stable through ED course and is stable for discharge.      I discussed with patient and/or family/caretaker that evaluation in the ED does not suggest any emergent or life threatening medical conditions requiring immediate intervention beyond what was provided in the ED, and I believe patient is safe for discharge.  Regardless, an unremarkable evaluation in the ED does not preclude the development or presence of a serious of life threatening condition. As such, patient was instructed to return immediately for any worsening or change in current symptoms.    Patient is safe for discharge. There is no suggestion of airway or ENT emergency. Patient is hemodynamically stable and there is no suggestion of active anaphylaxis or progressive worsening of current symptoms.      ED Medication(s):  Medications  "  methylPREDNISolone sodium succinate injection 125 mg (125 mg Intramuscular Given 5/5/23 1024)         Medical Decision Making   Medical Decision Making  Recent treatment with Augmentin for head cold.  Development of uniform rash to the anterior neck.  Redness to perineum.    Differential diagnosis:  Fixed drug reaction, allergic reaction, toxic epidermal necrolysis, Chriss Oli's    Patient was educated on spectrum of allergic reactions.  This does not appear to be compatible with hives.  More likely fixed drug reaction.  Risk benefits of prednisone were discussed with patient in detail.  Patient elected to pursue prednisone therapy.  Patient was advised to avoid Augmentin in the future.  We also discussed need for re-evaluation should there develop any tongue swelling, lip swelling, difficulty breathing, I involvement, ulcers, a rapid progression of rash.  Patient verbalized understanding.          Discussed case with:N/A         MIPS Measures     Smoker? No     Hypertension: History of Hypertension: The patient has elevated blood pressure (higher than 120/80) while being treated in the ED but has a history of hypertension.      Portions of this note may have been created with voice recognition software. Occasional "wrong-word" or "sound-a-like" substitutions may have occurred due to the inherent limitations of voice recognition software. Please, read the note carefully and recognize, using context, where substitutions have occurred.            Clinical Impression       ICD-10-CM ICD-9-CM   1. Fixed drug reaction  T50.905A 995.20         ED Disposition       Disposition: Discharge to home  Patient condition: Stable    Medication List     Medication List        START taking these medications      predniSONE 20 MG tablet  Commonly known as: DELTASONE  Take 2 tablets (40 mg total) by mouth once daily. for 5 days            ASK your doctor about these medications      albuterol 90 mcg/actuation inhaler  Commonly " known as: PROVENTIL/VENTOLIN HFA  Inhale 1-2 puffs into the lungs every 6 (six) hours as needed for Wheezing. Rescue     amiodarone 200 MG Tab  Commonly known as: PACERONE     apixaban 5 mg Tab  Commonly known as: ELIQUIS     atenoloL 50 MG tablet  Commonly known as: TENORMIN     fish-flx-prm-blkbor-om 3,6,9 5 400-400-200 mg Cap     lisinopriL-hydrochlorothiazide 20-12.5 mg per tablet  Commonly known as: PRINZIDE,ZESTORETIC     loratadine 10 mg dissolvable tablet  Commonly known as: CLARITIN REDITABS     metFORMIN 500 MG (MOD) 24hr tablet  Commonly known as: GLUMETZA     propafenone 150 MG Tab  Commonly known as: RHTHYMOL     pulse oximeter device  Commonly known as: pulse oximeter  Use twice daily at 8 AM and 3 PM and record the value in [employer] as directed.     simvastatin 20 MG tablet  Commonly known as: ZOCOR               Where to Get Your Medications        These medications were sent to [facility] - [place] - [address]  [address] [place] [place] [address]      Phone: [phone]   predniSONE 20 MG tablet         ED Follow-up   Follow-up Information       Davis Herrera MD In 2 days.    Specialty: Internal Medicine  Why: No Augmentin.  You are allergic to it.  Return to emergency department for:  Mouth lesions, lip lesions, difficulty breathing, difficulty swallowing, shortness of breath, unable to urinate, I lesions, or other concerns  Contact information:  [address] [facility]  [address]  [place] [address]                                      Vee Llanos,   05/05/23 7963

## 2023-05-29 DIAGNOSIS — I48.0 PAROXYSMAL ATRIAL FIBRILLATION: Primary | ICD-10-CM

## 2023-05-29 DIAGNOSIS — I10 ESSENTIAL HYPERTENSION: ICD-10-CM

## 2023-05-31 ENCOUNTER — HOSPITAL ENCOUNTER (OUTPATIENT)
Dept: RADIOLOGY | Facility: HOSPITAL | Age: 78
Discharge: HOME | End: 2023-05-31
Attending: SURGERY
Payer: MEDICARE

## 2023-05-31 ENCOUNTER — OFFICE VISIT (OUTPATIENT)
Dept: INTERNAL MEDICINE | Facility: CLINIC | Age: 78
End: 2023-05-31
Payer: MEDICARE

## 2023-05-31 VITALS
TEMPERATURE: 98 F | WEIGHT: 156.31 LBS | OXYGEN SATURATION: 95 % | BODY MASS INDEX: 31.51 KG/M2 | SYSTOLIC BLOOD PRESSURE: 136 MMHG | HEART RATE: 56 BPM | HEIGHT: 59 IN | DIASTOLIC BLOOD PRESSURE: 58 MMHG

## 2023-05-31 DIAGNOSIS — E11.69 TYPE 2 DIABETES MELLITUS WITH OTHER SPECIFIED COMPLICATION, WITHOUT LONG-TERM CURRENT USE OF INSULIN: ICD-10-CM

## 2023-05-31 DIAGNOSIS — Z76.89 ENCOUNTER TO ESTABLISH CARE: Primary | ICD-10-CM

## 2023-05-31 DIAGNOSIS — I10 ESSENTIAL HYPERTENSION: ICD-10-CM

## 2023-05-31 DIAGNOSIS — E66.09 CLASS 1 OBESITY DUE TO EXCESS CALORIES WITH SERIOUS COMORBIDITY AND BODY MASS INDEX (BMI) OF 31.0 TO 31.9 IN ADULT: ICD-10-CM

## 2023-05-31 DIAGNOSIS — Z78.0 POSTMENOPAUSAL: ICD-10-CM

## 2023-05-31 DIAGNOSIS — E78.2 MIXED HYPERLIPIDEMIA: ICD-10-CM

## 2023-05-31 DIAGNOSIS — I48.0 PAROXYSMAL ATRIAL FIBRILLATION: ICD-10-CM

## 2023-05-31 DIAGNOSIS — I44.7 LEFT BUNDLE BRANCH BLOCK: ICD-10-CM

## 2023-05-31 PROBLEM — E66.811 CLASS 1 OBESITY DUE TO EXCESS CALORIES WITH SERIOUS COMORBIDITY AND BODY MASS INDEX (BMI) OF 31.0 TO 31.9 IN ADULT: Status: ACTIVE | Noted: 2023-05-31

## 2023-05-31 PROBLEM — J06.9 UPPER RESPIRATORY TRACT INFECTION: Status: RESOLVED | Noted: 2019-03-06 | Resolved: 2023-05-31

## 2023-05-31 PROBLEM — J20.9 ACUTE BRONCHITIS: Status: RESOLVED | Noted: 2020-03-24 | Resolved: 2023-05-31

## 2023-05-31 PROCEDURE — 99204 PR OFFICE/OUTPT VISIT, NEW, LEVL IV, 45-59 MIN: ICD-10-PCS | Mod: S$GLB,,, | Performed by: NURSE PRACTITIONER

## 2023-05-31 PROCEDURE — 74177 CT ABD & PELVIS W/CONTRAST: CPT | Mod: 26,,, | Performed by: RADIOLOGY

## 2023-05-31 PROCEDURE — 99999 PR PBB SHADOW E&M-EST. PATIENT-LVL V: CPT | Mod: PBBFAC,,, | Performed by: NURSE PRACTITIONER

## 2023-05-31 PROCEDURE — 1101F PT FALLS ASSESS-DOCD LE1/YR: CPT | Mod: CPTII,S$GLB,, | Performed by: NURSE PRACTITIONER

## 2023-05-31 PROCEDURE — 1160F PR REVIEW ALL MEDS BY PRESCRIBER/CLIN PHARMACIST DOCUMENTED: ICD-10-PCS | Mod: CPTII,S$GLB,, | Performed by: NURSE PRACTITIONER

## 2023-05-31 PROCEDURE — 25500020 PHARM REV CODE 255: Mod: PO

## 2023-05-31 PROCEDURE — 3078F PR MOST RECENT DIASTOLIC BLOOD PRESSURE < 80 MM HG: ICD-10-PCS | Mod: CPTII,S$GLB,, | Performed by: NURSE PRACTITIONER

## 2023-05-31 PROCEDURE — 1126F AMNT PAIN NOTED NONE PRSNT: CPT | Mod: CPTII,S$GLB,, | Performed by: NURSE PRACTITIONER

## 2023-05-31 PROCEDURE — 3075F PR MOST RECENT SYSTOLIC BLOOD PRESS GE 130-139MM HG: ICD-10-PCS | Mod: CPTII,S$GLB,, | Performed by: NURSE PRACTITIONER

## 2023-05-31 PROCEDURE — 1126F PR PAIN SEVERITY QUANTIFIED, NO PAIN PRESENT: ICD-10-PCS | Mod: CPTII,S$GLB,, | Performed by: NURSE PRACTITIONER

## 2023-05-31 PROCEDURE — 3288F PR FALLS RISK ASSESSMENT DOCUMENTED: ICD-10-PCS | Mod: CPTII,S$GLB,, | Performed by: NURSE PRACTITIONER

## 2023-05-31 PROCEDURE — 3288F FALL RISK ASSESSMENT DOCD: CPT | Mod: CPTII,S$GLB,, | Performed by: NURSE PRACTITIONER

## 2023-05-31 PROCEDURE — 74177 CT ABD & PELVIS W/CONTRAST: CPT | Mod: TC,PO

## 2023-05-31 PROCEDURE — 1159F MED LIST DOCD IN RCRD: CPT | Mod: CPTII,S$GLB,, | Performed by: NURSE PRACTITIONER

## 2023-05-31 PROCEDURE — 1160F RVW MEDS BY RX/DR IN RCRD: CPT | Mod: CPTII,S$GLB,, | Performed by: NURSE PRACTITIONER

## 2023-05-31 PROCEDURE — 74177 CT ABDOMEN PELVIS WITH CONTRAST: ICD-10-PCS | Mod: 26,,, | Performed by: RADIOLOGY

## 2023-05-31 PROCEDURE — 1101F PR PT FALLS ASSESS DOC 0-1 FALLS W/OUT INJ PAST YR: ICD-10-PCS | Mod: CPTII,S$GLB,, | Performed by: NURSE PRACTITIONER

## 2023-05-31 PROCEDURE — 3075F SYST BP GE 130 - 139MM HG: CPT | Mod: CPTII,S$GLB,, | Performed by: NURSE PRACTITIONER

## 2023-05-31 PROCEDURE — 3078F DIAST BP <80 MM HG: CPT | Mod: CPTII,S$GLB,, | Performed by: NURSE PRACTITIONER

## 2023-05-31 PROCEDURE — 1159F PR MEDICATION LIST DOCUMENTED IN MEDICAL RECORD: ICD-10-PCS | Mod: CPTII,S$GLB,, | Performed by: NURSE PRACTITIONER

## 2023-05-31 PROCEDURE — 99999 PR PBB SHADOW E&M-EST. PATIENT-LVL V: ICD-10-PCS | Mod: PBBFAC,,, | Performed by: NURSE PRACTITIONER

## 2023-05-31 PROCEDURE — 99204 OFFICE O/P NEW MOD 45 MIN: CPT | Mod: S$GLB,,, | Performed by: NURSE PRACTITIONER

## 2023-05-31 RX ORDER — LANCETS 33 GAUGE
EACH MISCELLANEOUS
COMMUNITY
Start: 2022-09-01

## 2023-05-31 RX ORDER — FUROSEMIDE 40 MG/1
40 TABLET ORAL DAILY PRN
COMMUNITY
End: 2024-03-01 | Stop reason: SDUPTHER

## 2023-05-31 RX ORDER — INSULIN PUMP SYRINGE, 3 ML
EACH MISCELLANEOUS
COMMUNITY
Start: 2022-08-16

## 2023-05-31 RX ORDER — ATENOLOL 25 MG/1
1 TABLET ORAL DAILY
COMMUNITY
Start: 2022-06-17 | End: 2024-03-01 | Stop reason: SDUPTHER

## 2023-05-31 RX ORDER — AMIODARONE HYDROCHLORIDE 100 MG/1
1 TABLET ORAL DAILY
COMMUNITY
Start: 2023-04-25 | End: 2023-05-31

## 2023-05-31 RX ORDER — AMIODARONE HYDROCHLORIDE 100 MG/1
100 TABLET ORAL DAILY
COMMUNITY
End: 2023-07-26 | Stop reason: SDUPTHER

## 2023-05-31 RX ORDER — METFORMIN HYDROCHLORIDE 500 MG/1
500 TABLET ORAL DAILY
COMMUNITY
Start: 2023-05-13 | End: 2023-07-26 | Stop reason: SDUPTHER

## 2023-05-31 RX ADMIN — IOHEXOL 75 ML: 350 INJECTION, SOLUTION INTRAVENOUS at 11:05

## 2023-05-31 NOTE — ASSESSMENT & PLAN NOTE
Counseled on importance of diet and exercise. Encouraged patient to have an active lifestyle with regular exercise with healthy eating. Discussed the potential complications associated with obesity such as bone and joint pain, HTN, diabetes, and hyperlipidemia.

## 2023-05-31 NOTE — PROGRESS NOTES
Subjective:       Patient ID: Guillermina Person is a 78 y.o. female.    Chief Complaint: Establish Care    Mrs. Person presents to clinic with her  to establish care. Last seen by prior PCP on 23. History of DM, HLD, paroxysmal atrial fib, and HTN. Followed by Dr. Taylor, cardiology with upcoming appointment . She would like to establish care with cardiology at [facility]. Last labs completed in February with A1c 5.4, LDL 94, and GFR 58. She was seen by general surgery, Dr. Emerson on  for possible hernia recurrence.  CT abd/pelvis ordered. She has been unable to complete due to order issues.     Patient Active Problem List   Diagnosis    Essential hypertension    History of uterine cancer    Left bundle branch block    Paroxysmal atrial fibrillation    Primary osteoarthritis of both knees    Type 2 diabetes mellitus with other specified complication    Class 1 obesity due to excess calories with serious comorbidity and body mass index (BMI) of 31.0 to 31.9 in adult    Mixed hyperlipidemia       History reviewed. No pertinent family history.  Past Surgical History:   Procedure Laterality Date    ABDOMINAL SURGERY      APPENDECTOMY      BRAIN SURGERY       SECTION      HYSTERECTOMY      TONSILLECTOMY           Current Outpatient Medications:     albuterol (PROVENTIL/VENTOLIN HFA) 90 mcg/actuation inhaler, Inhale 1-2 puffs into the lungs every 6 (six) hours as needed for Wheezing. Rescue, Disp: 8 g, Rfl: 0    amiodarone (PACERONE) 100 MG Tab, Take 100 mg by mouth once daily., Disp: , Rfl:     apixaban (ELIQUIS) 5 mg Tab, Take 5 mg by mouth 2 (two) times daily., Disp: , Rfl:     atenoloL (TENORMIN) 25 MG tablet, Take 1 tablet by mouth once daily., Disp: , Rfl:     blood sugar diagnostic Strp, Test one time daily, Disp: , Rfl:     blood-glucose meter kit, USE AS DIRECTED, Disp: , Rfl:     fish-flx-prm-blkbor-om 3,6,9 5 400-400-200 mg Cap, Take 1 tablet by mouth once daily., Disp: , Rfl:      "furosemide (LASIX) 40 MG tablet, Take 40 mg by mouth daily as needed., Disp: , Rfl:     lancets 33 gauge Misc, Test one time daily, Disp: , Rfl:     lisinopril-hydrochlorothiazide (PRINZIDE,ZESTORETIC) 20-12.5 mg per tablet, Take 2 tablets by mouth once daily., Disp: , Rfl:     loratadine (CLARITIN REDITABS) 10 mg dissolvable tablet, Take 1 tablet by mouth daily as needed., Disp: , Rfl:     pulse oximeter (PULSE OXIMETER) device, Use twice daily at 8 AM and 3 PM and [facility] as directed., Disp: 1 each, Rfl: 0    simvastatin (ZOCOR) 20 MG tablet, Take 20 mg by mouth every evening., Disp: , Rfl:     metFORMIN (GLUCOPHAGE) 500 MG tablet, Take 500 mg by mouth once daily., Disp: , Rfl:   No current facility-administered medications for this visit.    Review of Systems   Constitutional:  Negative for chills, fatigue and fever.   Respiratory:  Negative for chest tightness, shortness of breath and wheezing.    Cardiovascular:  Positive for leg swelling. Negative for chest pain and palpitations.   Gastrointestinal:  Negative for abdominal pain, constipation, nausea and vomiting.   Genitourinary:  Negative for difficulty urinating.   Musculoskeletal:  Negative for myalgias.   Skin:  Negative for rash.   Neurological:  Negative for dizziness, syncope, weakness, light-headedness and headaches.   Psychiatric/Behavioral:  Negative for confusion and dysphoric mood. The patient is not nervous/anxious.      Objective:   BP (!) 136/58 (BP Location: Left arm, Patient Position: Sitting, BP Method: Medium (Manual))   Pulse (!) 56   Temp 97.9 °F (36.6 °C)   Ht 4' 11" (1.499 m)   Wt 70.9 kg (156 lb 4.9 oz)   SpO2 95%   BMI 31.57 kg/m²      Physical Exam  Constitutional:       General: She is not in acute distress.     Appearance: Normal appearance. She is not ill-appearing.   HENT:      Head: Normocephalic.   Eyes:      Extraocular Movements: Extraocular movements intact.      Conjunctiva/sclera: Conjunctivae " normal.   Cardiovascular:      Rate and Rhythm: Regular rhythm. Bradycardia present.      Heart sounds: Normal heart sounds. No murmur heard.  Pulmonary:      Effort: Pulmonary effort is normal. No respiratory distress.      Breath sounds: Normal breath sounds. No wheezing, rhonchi or rales.   Musculoskeletal:      Right lower leg: Edema (trace) present.      Left lower leg: Edema (trace) present.   Skin:     General: Skin is warm and dry.      Coloration: Skin is not pale.      Findings: No erythema or rash.   Neurological:      Mental Status: She is alert and oriented to person, place, and time.      Coordination: Coordination normal.      Gait: Gait normal.   Psychiatric:         Mood and Affect: Mood normal.         Behavior: Behavior normal.       Assessment & Plan     1. Encounter to establish care  Comments:  Reviewed previous PCP note, cardiology note, and labs. No concerning findings on exam. Continue current medications.    2. Type 2 diabetes mellitus with other specified complication, without long-term current use of insulin  Assessment & Plan:  Last A1c 5.4. Continue metformin.       3. Essential hypertension  Assessment & Plan:  Stable. BP well controlled today in clinic. Continue Lisinopril-HCTZ and atenolol.      4. Paroxysmal atrial fibrillation  Assessment & Plan:  Stable. Continue amiodarone, Eliquis, and atenolol. Followed by cardiology.    Orders:  -     Ambulatory referral/consult to Cardiology; Future; Expected date: 11/30/2023    5. Left bundle branch block  Assessment & Plan:  Stable. Continue current medications as prescribed. Followed by cardiology.      6. Mixed hyperlipidemia  Assessment & Plan:  Last LDL 94. Continue simvastatin.      7. Class 1 obesity due to excess calories with serious comorbidity and body mass index (BMI) of 31.0 to 31.9 in adult  Assessment & Plan:  Counseled on importance of diet and exercise. Encouraged patient to have an active lifestyle with regular exercise  "with healthy eating. Discussed the potential complications associated with obesity such as bone and joint pain, HTN, diabetes, and hyperlipidemia.        8. Postmenopausal  -     DXA Bone Density Axial Skeleton 1 or more sites; Future; Expected date: 05/31/2023        JAY JAY Yanez      Portions of this note may have been created with voice recognition software. Occasional "wrong-word" or "sound-a-like" substitutions may have occurred due to the inherent limitations of voice recognition software. Please, read the note carefully and recognize, using context, where substitutions have occurred.     "

## 2023-06-26 ENCOUNTER — TELEPHONE (OUTPATIENT)
Dept: INTERNAL MEDICINE | Facility: CLINIC | Age: 78
End: 2023-06-26
Payer: MEDICARE

## 2023-06-26 DIAGNOSIS — Z12.31 ENCOUNTER FOR SCREENING MAMMOGRAM FOR BREAST CANCER: Primary | ICD-10-CM

## 2023-07-10 ENCOUNTER — HOSPITAL ENCOUNTER (EMERGENCY)
Facility: HOSPITAL | Age: 78
Discharge: HOME | End: 2023-07-10
Attending: EMERGENCY MEDICINE
Payer: MEDICARE

## 2023-07-10 VITALS
OXYGEN SATURATION: 96 % | HEIGHT: 59 IN | SYSTOLIC BLOOD PRESSURE: 136 MMHG | TEMPERATURE: 98 F | WEIGHT: 156.94 LBS | BODY MASS INDEX: 31.64 KG/M2 | RESPIRATION RATE: 16 BRPM | HEART RATE: 64 BPM | DIASTOLIC BLOOD PRESSURE: 74 MMHG

## 2023-07-10 DIAGNOSIS — S00.83XA CONTUSION OF FACE, INITIAL ENCOUNTER: ICD-10-CM
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
## 2023-07-10 DIAGNOSIS — S62.115A CLOSED NONDISPLACED FRACTURE OF TRIQUETRUM OF LEFT WRIST, INITIAL ENCOUNTER: Primary | ICD-10-CM

## 2023-07-10 DIAGNOSIS — M25.532 LEFT WRIST PAIN: ICD-10-CM

## 2023-07-10 PROCEDURE — 29125 APPL SHORT ARM SPLINT STATIC: CPT | Mod: LT,ER

## 2023-07-10 PROCEDURE — 25000003 PHARM REV CODE 250: Mod: ER | Performed by: EMERGENCY MEDICINE

## 2023-07-10 PROCEDURE — 99284 EMERGENCY DEPT VISIT MOD MDM: CPT | Mod: 25,ER

## 2023-07-10 RX ORDER — HYDROCODONE BITARTRATE AND ACETAMINOPHEN 5; 325 MG/1; MG/1
1 TABLET ORAL
Status: COMPLETED | OUTPATIENT
Start: 2023-07-10 | End: 2023-07-10

## 2023-07-10 RX ORDER — HYDROCODONE BITARTRATE AND ACETAMINOPHEN 5; 325 MG/1; MG/1
1 TABLET ORAL EVERY 6 HOURS PRN
Qty: 12 TABLET | Refills: 0 | OUTPATIENT
Start: 2023-07-10 | End: 2023-09-24

## 2023-07-10 RX ORDER — ONDANSETRON 4 MG/1
4 TABLET, ORALLY DISINTEGRATING ORAL
Status: COMPLETED | OUTPATIENT
Start: 2023-07-10 | End: 2023-07-10

## 2023-07-10 RX ADMIN — HYDROCODONE BITARTRATE AND ACETAMINOPHEN 1 TABLET: 5; 325 TABLET ORAL at 06:07

## 2023-07-10 RX ADMIN — ONDANSETRON 4 MG: 4 TABLET, ORALLY DISINTEGRATING ORAL at 06:07

## 2023-07-10 NOTE — ED PROVIDER NOTES
Encounter Date: 7/10/2023       History     Chief Complaint   Patient presents with    Fall     Tripped and fell over a cement block yesterday around 2-3pm. Pain and bruising to chin. Complaining of left arm pain and swelling. Stated she had headaches and right ear pain but it is resolved at this time. Pt states she takes Eliquis 2x daily. Denies LOC     Patient is a 78-year-old female who presents today with complaints of acute onset left wrist pain after a mechanical ground level fall yesterday.  She states that she tripped over a cement block on the ground falling forward.  She states she hit her head, her face, in her left wrist.  She states she had a mild headache yesterday that is now resolved.  Mild chin pain with ecchymosis.  Her primary complaint is her left wrist pain.  Denies loss of consciousness, hip pain, back pain, neck pain, and all other symptoms.  Patient is on Eliquis.  Fall occurred approximately 15 hours ago      Review of patient's allergies indicates:   Allergen Reactions    Augmentin [amoxicillin-pot clavulanate]      Fixed drug reaction      Amoxicillin Itching, Rash and Other (See Comments)     Yeast infection     Past Medical History:   Diagnosis Date    Atrial flutter     Brain tumor (benign)     Cancer     Coronary artery disease     Diabetes mellitus     High cholesterol     Hypertension      Past Surgical History:   Procedure Laterality Date    ABDOMINAL SURGERY      APPENDECTOMY      BRAIN SURGERY       SECTION      HYSTERECTOMY      TONSILLECTOMY       No family history on file.  Social History     Tobacco Use    Smoking status: Never   Substance Use Topics    Alcohol use: No    Drug use: No     Review of Systems    Constitutional: No fevers, no fatigue  HENT: No headache, mild facial pain, no hearing loss  Eyes: No vision changes, no eye pain  Neck/Back: No neck pain, no back pain  Cardiovascular: No chest pain, no palpitations, no syncope  Respiratory: no SOB, no  cough  Abdominal: no abdominal pain, no N/V  Genitourinary: no pelvic pain, no genital pain  Musculoskeletal: Left hand pain/swelling  Neurological: No numbness, no paresthesias, no weakness, no LOC    Physical Exam     Initial Vitals   BP Pulse Resp Temp SpO2   07/10/23 0539 07/10/23 0539 07/10/23 0539 07/10/23 0543 07/10/23 0539   (!) 145/70 65 17 97.8 °F (36.6 °C) 95 %      MAP       --                Physical Exam    Constitutional: Awake, alert, NAD  HENT: normocephalic, chin ecchymosis, but no facial bone tenderness, no evidence of basilar skull fx  Eyes: PERRL, EOM, normal conjunctiva  Neck: Trachea midline, nontender, full ROM  Cardiovascular: RRR, 2+ palpable pulses in all 4 extremities  Pulmonary: Non-labored respirations, equal bilateral breath sounds, LCTAB  Chest Wall: No tenderness, no deformity  Abdominal: Soft, nontender, nondistended  Back: Nontender, no step-offs  Musculoskeletal:  Left wrist edema and tenderness with decreased range of motion of the left wrist.  No elbow tenderness or range of motion limitations  Neurological: AAO x4, GCS 15, maintaining airway and answering questions appropriately, no focal deficits  Skin:  No laceration      ED Course   Splint Application    Date/Time: 7/10/2023 7:56 AM  Performed by: Drew Fonseca MD  Authorized by: Bruno Pascal MD   Location details: left wrist  Splint type: volar short arm  Supplies used: Ortho-Glass  Post-procedure: The splinted body part was neurovascularly unchanged following the procedure.  Patient tolerance: Patient tolerated the procedure well with no immediate complications      Labs Reviewed - No data to display       Imaging Results              CT Head Without Contrast (Final result)  Result time 07/10/23 07:47:16      Final result by Harjit Leal III, MD (07/10/23 07:47:16)                   Impression:      No acute intracranial abnormality.  Additional findings as discussed.      Electronically signed  by: Harvinder Leal  Date:    07/10/2023  Time:    07:47               Narrative:    EXAMINATION:  CT HEAD WITHOUT CONTRAST    CLINICAL HISTORY:  Head trauma, minor (Age >= 65y);    TECHNIQUE:  Low dose axial images were obtained through the head.  Coronal and sagittal reformations were also performed. Contrast was not administered.    COMPARISON:  CT brain 07/11/2015    FINDINGS:  Interval right frontal craniotomy with overlying hardware.  No depressed skull fracture or skull lesions.  The mastoid air cells and paranasal sinuses are unremarkable.    Right frontal extra-axial mass has been resected with a small area of encephalomalacia within the right frontal lobe.    No intracranial mass, mass effect, hemorrhage, hydrocephalus, acute infarction or abnormal fluid collection.                                       X-Ray Wrist Complete Left (Final result)  Result time 07/10/23 07:18:44      Final result by Lambert Cobos MD (07/10/23 07:18:44)                   Impression:      Triquetral fracture.      Electronically signed by: Lambert Cobos  Date:    07/10/2023  Time:    07:18               Narrative:    EXAMINATION:  XR WRIST COMPLETE 3 VIEWS LEFT    CLINICAL HISTORY:  Pain in left wrist    TECHNIQUE:  PA, lateral, and oblique views of the left wrist were performed.    COMPARISON:  None    FINDINGS:  Triquetral fracture.  Scapholunate interval is maintained.  Scattered mild degenerative changes.  3 mm radial negative variance.  Soft tissue swelling.                                       Medications   HYDROcodone-acetaminophen 5-325 mg per tablet 1 tablet (1 tablet Oral Given 7/10/23 0605)   ondansetron disintegrating tablet 4 mg (4 mg Oral Given 7/10/23 0609)     Medical Decision Making:   Initial Assessment:   Concern for left wrist fracture.  X-ray ordered.  Given her age, head trauma, and use of Eliquis, patient will need a CT scan  Differential Diagnosis:   Intracranial hemorrhage, wrist  fracture/sprain  Clinical Tests:   Radiological Study: Ordered and Reviewed  ED Management:  6:00 AM: Care handed off to Dr. Fonseca  No Intracranial Bleed  Splint short arm  Prescription management  Orthopedic follow up                        Clinical Impression:   Final diagnoses:  [M25.532] Left wrist pain  [W19.XXXA] Fall, initial encounter  [S00.83XA] Contusion of face, initial encounter  [S62.115A] Closed nondisplaced fracture of triquetrum of left wrist, initial encounter (Primary)        ED Disposition Condition    Discharge Stable          ED Prescriptions       Medication Sig Dispense Start Date End Date Auth. Provider    HYDROcodone-acetaminophen (NORCO) 5-325 mg per tablet Take 1 tablet by mouth every 6 (six) hours as needed for Pain. 12 tablet 7/10/2023 -- Drew Fonseca MD          Follow-up Information       Follow up With Specialties Details Why Contact Info    [facility] Orthopedics In 3 days  [address]  [facility] [address] [phone]    Thomas Pimentel MD Internal Medicine, Family Medicine In 2 days  [address] [address] [id number] [phone]      [facility] - Emergency Dept Emergency Medicine  If symptoms worsen [address]  [facility] [address] [phone]             Drew Fonseca MD  07/10/23 5685

## 2023-07-27 RX ORDER — SIMVASTATIN 20 MG/1
20 TABLET, FILM COATED ORAL NIGHTLY
Qty: 90 TABLET | Refills: 1 | Status: SHIPPED | OUTPATIENT
Start: 2023-07-27 | End: 2024-01-23 | Stop reason: SDUPTHER

## 2023-07-27 RX ORDER — AMIODARONE HYDROCHLORIDE 100 MG/1
100 TABLET ORAL DAILY
Qty: 90 TABLET | Refills: 1 | Status: SHIPPED | OUTPATIENT
Start: 2023-07-27 | End: 2024-01-29 | Stop reason: SDUPTHER

## 2023-07-27 RX ORDER — METFORMIN HYDROCHLORIDE 500 MG/1
500 TABLET ORAL DAILY
Qty: 90 TABLET | Refills: 1 | Status: SHIPPED | OUTPATIENT
Start: 2023-07-27 | End: 2023-11-17 | Stop reason: SDUPTHER

## 2023-08-17 ENCOUNTER — TELEPHONE (OUTPATIENT)
Dept: INTERNAL MEDICINE | Facility: CLINIC | Age: 78
End: 2023-08-17
Payer: MEDICARE

## 2023-08-17 NOTE — TELEPHONE ENCOUNTER
----- Message from Alicia Parikh sent at 8/17/2023 12:03 PM CDT -----  Contact: Guillermina  Patient request to speak with a nurse regarding their body. Patient did not want to provide any further information.  Please call patient at [phone] to assist.    
Called patient, no answer, no vm  
left retinal detachment

## 2023-08-24 LAB
LEFT EYE DM RETINOPATHY: NEGATIVE
RIGHT EYE DM RETINOPATHY: NEGATIVE

## 2023-08-31 ENCOUNTER — OFFICE VISIT (OUTPATIENT)
Dept: INTERNAL MEDICINE | Facility: CLINIC | Age: 78
End: 2023-08-31
Payer: MEDICARE

## 2023-08-31 VITALS
BODY MASS INDEX: 30.76 KG/M2 | DIASTOLIC BLOOD PRESSURE: 70 MMHG | HEART RATE: 67 BPM | WEIGHT: 152.56 LBS | HEIGHT: 59 IN | OXYGEN SATURATION: 98 % | SYSTOLIC BLOOD PRESSURE: 130 MMHG | TEMPERATURE: 98 F

## 2023-08-31 DIAGNOSIS — I48.0 PAROXYSMAL ATRIAL FIBRILLATION: ICD-10-CM

## 2023-08-31 DIAGNOSIS — I10 ESSENTIAL HYPERTENSION: ICD-10-CM

## 2023-08-31 DIAGNOSIS — R30.0 DYSURIA: ICD-10-CM

## 2023-08-31 DIAGNOSIS — N30.01 ACUTE CYSTITIS WITH HEMATURIA: Primary | ICD-10-CM

## 2023-08-31 DIAGNOSIS — E11.69 TYPE 2 DIABETES MELLITUS WITH OTHER SPECIFIED COMPLICATION, WITHOUT LONG-TERM CURRENT USE OF INSULIN: ICD-10-CM

## 2023-08-31 LAB
BACTERIA #/AREA URNS AUTO: ABNORMAL /HPF
BILIRUB UR QL STRIP: NEGATIVE
CLARITY UR REFRACT.AUTO: ABNORMAL
COLOR UR AUTO: YELLOW
GLUCOSE UR QL STRIP: NEGATIVE
HGB UR QL STRIP: ABNORMAL
HYALINE CASTS UR QL AUTO: 1 /LPF
KETONES UR QL STRIP: ABNORMAL
LEUKOCYTE ESTERASE UR QL STRIP: ABNORMAL
MICROSCOPIC COMMENT: ABNORMAL
NITRITE UR QL STRIP: POSITIVE
PH UR STRIP: 6 [PH] (ref 5–8)
PROT UR QL STRIP: ABNORMAL
RBC #/AREA URNS AUTO: 10 /HPF (ref 0–4)
SP GR UR STRIP: 1.02 (ref 1–1.03)
URN SPEC COLLECT METH UR: ABNORMAL
UROBILINOGEN UR STRIP-ACNC: NEGATIVE EU/DL
WBC #/AREA URNS AUTO: 25 /HPF (ref 0–5)
WBC CLUMPS UR QL AUTO: ABNORMAL

## 2023-08-31 PROCEDURE — 1160F PR REVIEW ALL MEDS BY PRESCRIBER/CLIN PHARMACIST DOCUMENTED: ICD-10-PCS | Mod: CPTII,S$GLB,, | Performed by: NURSE PRACTITIONER

## 2023-08-31 PROCEDURE — 1160F RVW MEDS BY RX/DR IN RCRD: CPT | Mod: CPTII,S$GLB,, | Performed by: NURSE PRACTITIONER

## 2023-08-31 PROCEDURE — 3078F DIAST BP <80 MM HG: CPT | Mod: CPTII,S$GLB,, | Performed by: NURSE PRACTITIONER

## 2023-08-31 PROCEDURE — 1126F AMNT PAIN NOTED NONE PRSNT: CPT | Mod: CPTII,S$GLB,, | Performed by: NURSE PRACTITIONER

## 2023-08-31 PROCEDURE — 87086 URINE CULTURE/COLONY COUNT: CPT | Performed by: NURSE PRACTITIONER

## 2023-08-31 PROCEDURE — 87186 SC STD MICRODIL/AGAR DIL: CPT | Performed by: NURSE PRACTITIONER

## 2023-08-31 PROCEDURE — 99214 PR OFFICE/OUTPT VISIT, EST, LEVL IV, 30-39 MIN: ICD-10-PCS | Mod: S$GLB,,, | Performed by: NURSE PRACTITIONER

## 2023-08-31 PROCEDURE — 3288F FALL RISK ASSESSMENT DOCD: CPT | Mod: CPTII,S$GLB,, | Performed by: NURSE PRACTITIONER

## 2023-08-31 PROCEDURE — 3075F SYST BP GE 130 - 139MM HG: CPT | Mod: CPTII,S$GLB,, | Performed by: NURSE PRACTITIONER

## 2023-08-31 PROCEDURE — 99999 PR PBB SHADOW E&M-EST. PATIENT-LVL V: CPT | Mod: PBBFAC,,, | Performed by: NURSE PRACTITIONER

## 2023-08-31 PROCEDURE — 3288F PR FALLS RISK ASSESSMENT DOCUMENTED: ICD-10-PCS | Mod: CPTII,S$GLB,, | Performed by: NURSE PRACTITIONER

## 2023-08-31 PROCEDURE — 87088 URINE BACTERIA CULTURE: CPT | Performed by: NURSE PRACTITIONER

## 2023-08-31 PROCEDURE — 3075F PR MOST RECENT SYSTOLIC BLOOD PRESS GE 130-139MM HG: ICD-10-PCS | Mod: CPTII,S$GLB,, | Performed by: NURSE PRACTITIONER

## 2023-08-31 PROCEDURE — 1101F PR PT FALLS ASSESS DOC 0-1 FALLS W/OUT INJ PAST YR: ICD-10-PCS | Mod: CPTII,S$GLB,, | Performed by: NURSE PRACTITIONER

## 2023-08-31 PROCEDURE — 1159F MED LIST DOCD IN RCRD: CPT | Mod: CPTII,S$GLB,, | Performed by: NURSE PRACTITIONER

## 2023-08-31 PROCEDURE — 1101F PT FALLS ASSESS-DOCD LE1/YR: CPT | Mod: CPTII,S$GLB,, | Performed by: NURSE PRACTITIONER

## 2023-08-31 PROCEDURE — 87077 CULTURE AEROBIC IDENTIFY: CPT | Performed by: NURSE PRACTITIONER

## 2023-08-31 PROCEDURE — 3078F PR MOST RECENT DIASTOLIC BLOOD PRESSURE < 80 MM HG: ICD-10-PCS | Mod: CPTII,S$GLB,, | Performed by: NURSE PRACTITIONER

## 2023-08-31 PROCEDURE — 81000 URINALYSIS NONAUTO W/SCOPE: CPT | Mod: PO | Performed by: NURSE PRACTITIONER

## 2023-08-31 PROCEDURE — 1126F PR PAIN SEVERITY QUANTIFIED, NO PAIN PRESENT: ICD-10-PCS | Mod: CPTII,S$GLB,, | Performed by: NURSE PRACTITIONER

## 2023-08-31 PROCEDURE — 99999 PR PBB SHADOW E&M-EST. PATIENT-LVL V: ICD-10-PCS | Mod: PBBFAC,,, | Performed by: NURSE PRACTITIONER

## 2023-08-31 PROCEDURE — 1159F PR MEDICATION LIST DOCUMENTED IN MEDICAL RECORD: ICD-10-PCS | Mod: CPTII,S$GLB,, | Performed by: NURSE PRACTITIONER

## 2023-08-31 PROCEDURE — 99214 OFFICE O/P EST MOD 30 MIN: CPT | Mod: S$GLB,,, | Performed by: NURSE PRACTITIONER

## 2023-08-31 RX ORDER — NITROFURANTOIN 25; 75 MG/1; MG/1
100 CAPSULE ORAL 2 TIMES DAILY
Qty: 10 CAPSULE | Refills: 0 | Status: SHIPPED | OUTPATIENT
Start: 2023-08-31 | End: 2023-09-05

## 2023-08-31 NOTE — PROGRESS NOTES
Subjective:       Patient ID: Guillermina Person is a 78 y.o. female.    Chief Complaint: Urinary Tract Infection    Mrs. Person presents to clinic for possible UTI. Started with pelvic discomfort  then developed dysuria, hesitancy, urgency, and frequency. Experienced left flank pain today for 1-2 hours. Taking AZO without relief.    Urinary Tract Infection   This is a new problem. The current episode started in the past 7 days. The problem occurs every urination. The pain is moderate. There has been no fever. Associated symptoms include chills, flank pain (left flank pain for 1-2 hours), frequency, hesitancy and urgency. Pertinent negatives include no behavior changes, discharge, hematuria, nausea, possible pregnancy, vomiting or rash. Treatments tried: AZO.       Patient Active Problem List   Diagnosis    Essential hypertension    History of uterine cancer    Left bundle branch block    Paroxysmal atrial fibrillation    Primary osteoarthritis of both knees    Type 2 diabetes mellitus with other specified complication    Class 1 obesity due to excess calories with serious comorbidity and body mass index (BMI) of 31.0 to 31.9 in adult    Mixed hyperlipidemia       History reviewed. No pertinent family history.  Past Surgical History:   Procedure Laterality Date    ABDOMINAL SURGERY      APPENDECTOMY      BRAIN SURGERY       SECTION      HYSTERECTOMY      TONSILLECTOMY           Current Outpatient Medications:     albuterol (PROVENTIL/VENTOLIN HFA) 90 mcg/actuation inhaler, Inhale 1-2 puffs into the lungs every 6 (six) hours as needed for Wheezing. Rescue, Disp: 8 g, Rfl: 0    amiodarone (PACERONE) 100 MG Tab, Take 1 tablet (100 mg total) by mouth once daily., Disp: 90 tablet, Rfl: 1    apixaban (ELIQUIS) 5 mg Tab, Take 5 mg by mouth 2 (two) times daily., Disp: , Rfl:     atenoloL (TENORMIN) 25 MG tablet, Take 1 tablet by mouth once daily., Disp: , Rfl:     blood sugar diagnostic Strp, Test one time daily,  Disp: , Rfl:     blood-glucose meter kit, USE AS DIRECTED, Disp: , Rfl:     fish-flx-prm-blkbor-om 3,6,9 5 400-400-200 mg Cap, Take 1 tablet by mouth once daily., Disp: , Rfl:     furosemide (LASIX) 40 MG tablet, Take 40 mg by mouth daily as needed., Disp: , Rfl:     HYDROcodone-acetaminophen (NORCO) 5-325 mg per tablet, Take 1 tablet by mouth every 6 (six) hours as needed for Pain., Disp: 12 tablet, Rfl: 0    lancets 33 gauge Misc, Test one time daily, Disp: , Rfl:     lisinopril-hydrochlorothiazide (PRINZIDE,ZESTORETIC) 20-12.5 mg per tablet, Take 2 tablets by mouth once daily., Disp: , Rfl:     loratadine (CLARITIN REDITABS) 10 mg dissolvable tablet, Take 1 tablet by mouth daily as needed., Disp: , Rfl:     metFORMIN (GLUCOPHAGE) 500 MG tablet, Take 1 tablet (500 mg total) by mouth once daily., Disp: 90 tablet, Rfl: 1    pulse oximeter (PULSE OXIMETER) device, Use twice daily at 8 AM and 3 PM and [facility]., Disp: 1 each, Rfl: 0    simvastatin (ZOCOR) 20 MG tablet, Take 1 tablet (20 mg total) by mouth every evening., Disp: 90 tablet, Rfl: 1    nitrofurantoin, macrocrystal-monohydrate, (MACROBID) 100 MG capsule, Take 1 capsule (100 mg total) by mouth 2 (two) times daily. for 5 days, Disp: 10 capsule, Rfl: 0    Review of Systems   Constitutional:  Positive for chills. Negative for fatigue and fever.   Gastrointestinal:  Negative for nausea and vomiting.   Genitourinary:  Positive for difficulty urinating, dysuria, flank pain (left flank pain for 1-2 hours), frequency, hesitancy, pelvic pain and urgency. Negative for decreased urine volume, hematuria and vaginal discharge.   Musculoskeletal:  Negative for myalgias.   Skin:  Negative for rash.   Neurological:  Negative for weakness.   Psychiatric/Behavioral:  Negative for confusion.        Objective:   /70 (BP Location: Left arm, Patient Position: Sitting, BP Method: Large (Manual))   Pulse 67   Temp 97.9 °F (36.6 °C)   Ht 4'  "11" (1.499 m)   Wt 69.2 kg (152 lb 8.9 oz)   SpO2 98%   BMI 30.81 kg/m²      Physical Exam  Constitutional:       General: She is not in acute distress.     Appearance: Normal appearance. She is not ill-appearing.   Cardiovascular:      Rate and Rhythm: Normal rate and regular rhythm.      Heart sounds: Normal heart sounds.   Pulmonary:      Effort: Pulmonary effort is normal. No respiratory distress.      Breath sounds: Normal breath sounds. No wheezing, rhonchi or rales.   Abdominal:      General: Bowel sounds are normal. There is no distension.      Palpations: There is no mass.      Tenderness: There is abdominal tenderness in the right lower quadrant, suprapubic area and left lower quadrant. There is no right CVA tenderness, left CVA tenderness, guarding or rebound.   Neurological:      Mental Status: She is alert and oriented to person, place, and time.   Psychiatric:         Behavior: Behavior normal.         Assessment & Plan     Results for orders placed or performed in visit on 08/31/23   Urinalysis, Reflex to Urine Culture Urine, Clean Catch    Specimen: Urine   Result Value Ref Range    Specimen UA Urine, Clean Catch     Color, UA Yellow Yellow, Straw, Jen    Appearance, UA Cloudy (A) Clear    pH, UA 6.0 5.0 - 8.0    Specific Gravity, UA 1.025 1.005 - 1.030    Protein, UA 1+ (A) Negative    Glucose, UA Negative Negative    Ketones, UA Trace (A) Negative    Bilirubin (UA) Negative Negative    Occult Blood UA 3+ (A) Negative    Nitrite, UA Positive (A) Negative    Urobilinogen, UA Negative <2.0 EU/dL    Leukocytes, UA 1+ (A) Negative   Urinalysis Microscopic   Result Value Ref Range    RBC, UA 10 (H) 0 - 4 /hpf    WBC, UA 25 (H) 0 - 5 /hpf    WBC Clumps, UA Few (A) None-Rare    Bacteria Few (A) None-Occ /hpf    Hyaline Casts, UA 1 0-1/lpf /lpf    Microscopic Comment SEE COMMENT        1. Acute cystitis with hematuria  Comments:  UA +UTI, culture pending. Start macrobid x 5 days. Stay well hydrated. " "Notify clinic if symptoms do not improve or develop fever, N/V, or flank pain.    Orders:  -     nitrofurantoin, macrocrystal-monohydrate, (MACROBID) 100 MG capsule; Take 1 capsule (100 mg total) by mouth 2 (two) times daily. for 5 days  Dispense: 10 capsule; Refill: 0    2. Dysuria  -     Urinalysis, Reflex to Urine Culture Urine, Clean Catch    3. Essential hypertension  Assessment & Plan:  Controlled. Continue lisinopril-hctz.      4. Type 2 diabetes mellitus with other specified complication, without long-term current use of insulin  Assessment & Plan:  Controlled. Continue metformin.      5. Paroxysmal atrial fibrillation  Assessment & Plan:  Rate controlled. Continue amiodarone, Eliquis, and atenolol      Other orders  -     Urinalysis Microscopic  -     Urine culture        JAY JAY Yanez      Portions of this note may have been created with voice recognition software. Occasional "wrong-word" or "sound-a-like" substitutions may have occurred due to the inherent limitations of voice recognition software. Please, read the note carefully and recognize, using context, where substitutions have occurred.     "

## 2023-09-03 LAB — BACTERIA UR CULT: ABNORMAL

## 2023-09-06 ENCOUNTER — PATIENT OUTREACH (OUTPATIENT)
Dept: ADMINISTRATIVE | Facility: HOSPITAL | Age: 78
End: 2023-09-06
Payer: MEDICARE

## 2023-09-07 ENCOUNTER — TELEPHONE (OUTPATIENT)
Dept: INTERNAL MEDICINE | Facility: CLINIC | Age: 78
End: 2023-09-07

## 2023-09-07 ENCOUNTER — OFFICE VISIT (OUTPATIENT)
Dept: INTERNAL MEDICINE | Facility: CLINIC | Age: 78
End: 2023-09-07
Payer: MEDICARE

## 2023-09-07 VITALS
TEMPERATURE: 98 F | OXYGEN SATURATION: 97 % | SYSTOLIC BLOOD PRESSURE: 138 MMHG | HEIGHT: 59 IN | HEART RATE: 66 BPM | DIASTOLIC BLOOD PRESSURE: 68 MMHG | BODY MASS INDEX: 30.67 KG/M2 | WEIGHT: 152.13 LBS

## 2023-09-07 DIAGNOSIS — R10.2 SUPRAPUBIC PRESSURE: Primary | ICD-10-CM

## 2023-09-07 DIAGNOSIS — E11.69 TYPE 2 DIABETES MELLITUS WITH OTHER SPECIFIED COMPLICATION, WITHOUT LONG-TERM CURRENT USE OF INSULIN: ICD-10-CM

## 2023-09-07 DIAGNOSIS — I48.0 PAROXYSMAL ATRIAL FIBRILLATION: ICD-10-CM

## 2023-09-07 DIAGNOSIS — N30.01 ACUTE CYSTITIS WITH HEMATURIA: ICD-10-CM

## 2023-09-07 DIAGNOSIS — I10 ESSENTIAL HYPERTENSION: ICD-10-CM

## 2023-09-07 DIAGNOSIS — E11.69 TYPE 2 DIABETES MELLITUS WITH OTHER SPECIFIED COMPLICATION, WITHOUT LONG-TERM CURRENT USE OF INSULIN: Primary | ICD-10-CM

## 2023-09-07 LAB
BACTERIA #/AREA URNS AUTO: ABNORMAL /HPF
BILIRUB UR QL STRIP: ABNORMAL
CLARITY UR REFRACT.AUTO: ABNORMAL
COLOR UR AUTO: YELLOW
GLUCOSE UR QL STRIP: NEGATIVE
HGB UR QL STRIP: ABNORMAL
HYALINE CASTS UR QL AUTO: 1 /LPF
KETONES UR QL STRIP: NEGATIVE
LEUKOCYTE ESTERASE UR QL STRIP: ABNORMAL
MICROSCOPIC COMMENT: ABNORMAL
NITRITE UR QL STRIP: POSITIVE
PH UR STRIP: 6 [PH] (ref 5–8)
PROT UR QL STRIP: ABNORMAL
RBC #/AREA URNS AUTO: 12 /HPF (ref 0–4)
SP GR UR STRIP: >=1.03 (ref 1–1.03)
URN SPEC COLLECT METH UR: ABNORMAL
UROBILINOGEN UR STRIP-ACNC: NEGATIVE EU/DL
WBC #/AREA URNS AUTO: 40 /HPF (ref 0–5)
WBC CLUMPS UR QL AUTO: ABNORMAL

## 2023-09-07 PROCEDURE — 99999 PR PBB SHADOW E&M-EST. PATIENT-LVL IV: CPT | Mod: PBBFAC,,, | Performed by: NURSE PRACTITIONER

## 2023-09-07 PROCEDURE — 2023F DILAT RTA XM W/O RTNOPTHY: CPT | Mod: CPTII,S$GLB,, | Performed by: NURSE PRACTITIONER

## 2023-09-07 PROCEDURE — 1125F PR PAIN SEVERITY QUANTIFIED, PAIN PRESENT: ICD-10-PCS | Mod: CPTII,S$GLB,, | Performed by: NURSE PRACTITIONER

## 2023-09-07 PROCEDURE — 87186 SC STD MICRODIL/AGAR DIL: CPT | Performed by: NURSE PRACTITIONER

## 2023-09-07 PROCEDURE — 2023F PR DILATED RETINAL EXAM W/O EVID OF RETINOPATHY: ICD-10-PCS | Mod: CPTII,S$GLB,, | Performed by: NURSE PRACTITIONER

## 2023-09-07 PROCEDURE — 87077 CULTURE AEROBIC IDENTIFY: CPT | Performed by: NURSE PRACTITIONER

## 2023-09-07 PROCEDURE — 99999 PR PBB SHADOW E&M-EST. PATIENT-LVL IV: ICD-10-PCS | Mod: PBBFAC,,, | Performed by: NURSE PRACTITIONER

## 2023-09-07 PROCEDURE — 1159F PR MEDICATION LIST DOCUMENTED IN MEDICAL RECORD: ICD-10-PCS | Mod: CPTII,S$GLB,, | Performed by: NURSE PRACTITIONER

## 2023-09-07 PROCEDURE — 3078F DIAST BP <80 MM HG: CPT | Mod: CPTII,S$GLB,, | Performed by: NURSE PRACTITIONER

## 2023-09-07 PROCEDURE — 3075F PR MOST RECENT SYSTOLIC BLOOD PRESS GE 130-139MM HG: ICD-10-PCS | Mod: CPTII,S$GLB,, | Performed by: NURSE PRACTITIONER

## 2023-09-07 PROCEDURE — 3288F FALL RISK ASSESSMENT DOCD: CPT | Mod: CPTII,S$GLB,, | Performed by: NURSE PRACTITIONER

## 2023-09-07 PROCEDURE — 1101F PT FALLS ASSESS-DOCD LE1/YR: CPT | Mod: CPTII,S$GLB,, | Performed by: NURSE PRACTITIONER

## 2023-09-07 PROCEDURE — 1159F MED LIST DOCD IN RCRD: CPT | Mod: CPTII,S$GLB,, | Performed by: NURSE PRACTITIONER

## 2023-09-07 PROCEDURE — 3288F PR FALLS RISK ASSESSMENT DOCUMENTED: ICD-10-PCS | Mod: CPTII,S$GLB,, | Performed by: NURSE PRACTITIONER

## 2023-09-07 PROCEDURE — 3078F PR MOST RECENT DIASTOLIC BLOOD PRESSURE < 80 MM HG: ICD-10-PCS | Mod: CPTII,S$GLB,, | Performed by: NURSE PRACTITIONER

## 2023-09-07 PROCEDURE — 99214 OFFICE O/P EST MOD 30 MIN: CPT | Mod: S$GLB,,, | Performed by: NURSE PRACTITIONER

## 2023-09-07 PROCEDURE — 3075F SYST BP GE 130 - 139MM HG: CPT | Mod: CPTII,S$GLB,, | Performed by: NURSE PRACTITIONER

## 2023-09-07 PROCEDURE — 99214 PR OFFICE/OUTPT VISIT, EST, LEVL IV, 30-39 MIN: ICD-10-PCS | Mod: S$GLB,,, | Performed by: NURSE PRACTITIONER

## 2023-09-07 PROCEDURE — 1101F PR PT FALLS ASSESS DOC 0-1 FALLS W/OUT INJ PAST YR: ICD-10-PCS | Mod: CPTII,S$GLB,, | Performed by: NURSE PRACTITIONER

## 2023-09-07 PROCEDURE — 1160F RVW MEDS BY RX/DR IN RCRD: CPT | Mod: CPTII,S$GLB,, | Performed by: NURSE PRACTITIONER

## 2023-09-07 PROCEDURE — 1125F AMNT PAIN NOTED PAIN PRSNT: CPT | Mod: CPTII,S$GLB,, | Performed by: NURSE PRACTITIONER

## 2023-09-07 PROCEDURE — 81000 URINALYSIS NONAUTO W/SCOPE: CPT | Mod: PO | Performed by: NURSE PRACTITIONER

## 2023-09-07 PROCEDURE — 87086 URINE CULTURE/COLONY COUNT: CPT | Performed by: NURSE PRACTITIONER

## 2023-09-07 PROCEDURE — 1160F PR REVIEW ALL MEDS BY PRESCRIBER/CLIN PHARMACIST DOCUMENTED: ICD-10-PCS | Mod: CPTII,S$GLB,, | Performed by: NURSE PRACTITIONER

## 2023-09-07 PROCEDURE — 87088 URINE BACTERIA CULTURE: CPT | Performed by: NURSE PRACTITIONER

## 2023-09-07 RX ORDER — CIPROFLOXACIN 500 MG/1
500 TABLET ORAL 2 TIMES DAILY
Qty: 14 TABLET | Refills: 0 | Status: SHIPPED | OUTPATIENT
Start: 2023-09-07 | End: 2023-09-14

## 2023-09-07 NOTE — PROGRESS NOTES
Subjective:       Patient ID: Guillermina Person is a 78 y.o. female.    Chief Complaint: Vaginal Pain    Mrs. Person returns to clinic for persistent pelvic discomfort/pressure. She was seen in clinic on 23 for pelvic discomfort, dysuria, hesitancy, urgency, and frequency. Urine culture +ecoli. Started on Macrobid. Today reports symptoms have mostly improved, but continues with pelvic discomfort. Denies vaginal discharge or irritation.    Urinary Tract Infection   This is a new problem. The current episode started 1 to 4 weeks ago. The problem occurs every urination. The pain is moderate. There has been no fever. Associated symptoms include frequency (improved) and hesitancy (improved). Pertinent negatives include no behavior changes, chills, discharge, flank pain, hematuria, nausea, possible pregnancy, urgency, vomiting or rash. She has tried antibiotics for the symptoms. The treatment provided mild relief.       Patient Active Problem List   Diagnosis    Essential hypertension    History of uterine cancer    Left bundle branch block    Paroxysmal atrial fibrillation    Primary osteoarthritis of both knees    Type 2 diabetes mellitus with other specified complication    Class 1 obesity due to excess calories with serious comorbidity and body mass index (BMI) of 31.0 to 31.9 in adult    Mixed hyperlipidemia       History reviewed. No pertinent family history.  Past Surgical History:   Procedure Laterality Date    ABDOMINAL SURGERY      APPENDECTOMY      BRAIN SURGERY       SECTION      HYSTERECTOMY      TONSILLECTOMY           Current Outpatient Medications:     albuterol (PROVENTIL/VENTOLIN HFA) 90 mcg/actuation inhaler, Inhale 1-2 puffs into the lungs every 6 (six) hours as needed for Wheezing. Rescue, Disp: 8 g, Rfl: 0    amiodarone (PACERONE) 100 MG Tab, Take 1 tablet (100 mg total) by mouth once daily., Disp: 90 tablet, Rfl: 1    apixaban (ELIQUIS) 5 mg Tab, Take 5 mg by mouth 2 (two) times daily.,  "Disp: , Rfl:     atenoloL (TENORMIN) 25 MG tablet, Take 1 tablet by mouth once daily., Disp: , Rfl:     blood sugar diagnostic Strp, Test one time daily, Disp: , Rfl:     blood-glucose meter kit, USE AS DIRECTED, Disp: , Rfl:     fish-flx-prm-blkbor-om 3,6,9 5 400-400-200 mg Cap, Take 1 tablet by mouth once daily., Disp: , Rfl:     furosemide (LASIX) 40 MG tablet, Take 40 mg by mouth daily as needed., Disp: , Rfl:     HYDROcodone-acetaminophen (NORCO) 5-325 mg per tablet, Take 1 tablet by mouth every 6 (six) hours as needed for Pain., Disp: 12 tablet, Rfl: 0    lancets 33 gauge Misc, Test one time daily, Disp: , Rfl:     lisinopril-hydrochlorothiazide (PRINZIDE,ZESTORETIC) 20-12.5 mg per tablet, Take 2 tablets by mouth once daily., Disp: , Rfl:     loratadine (CLARITIN REDITABS) 10 mg dissolvable tablet, Take 1 tablet by mouth daily as needed., Disp: , Rfl:     metFORMIN (GLUCOPHAGE) 500 MG tablet, Take 1 tablet (500 mg total) by mouth once daily., Disp: 90 tablet, Rfl: 1    pulse oximeter (PULSE OXIMETER) device, Use twice daily at 8 AM and 3 PM and [facility] the [facility] as directed., Disp: 1 each, Rfl: 0    simvastatin (ZOCOR) 20 MG tablet, Take 1 tablet (20 mg total) by mouth every evening., Disp: 90 tablet, Rfl: 1    Review of Systems   Constitutional:  Negative for chills and fever.   Gastrointestinal:  Negative for nausea and vomiting.   Genitourinary:  Positive for frequency (improved), hesitancy (improved) and pelvic pain (pressure). Negative for decreased urine volume, difficulty urinating, dysuria, flank pain, hematuria, urgency, vaginal bleeding, vaginal discharge and vaginal pain.   Musculoskeletal:  Negative for myalgias.   Skin:  Negative for rash.       Objective:   /68 (BP Location: Left arm, Patient Position: Sitting)   Pulse 66   Temp 98.1 °F (36.7 °C)   Ht 4' 11" (1.499 m)   Wt 69 kg (152 lb 1.9 oz)   SpO2 97%   BMI 30.72 kg/m²      Physical Exam  Constitutional:       General: " "She is not in acute distress.     Appearance: Normal appearance. She is not ill-appearing.   Cardiovascular:      Rate and Rhythm: Normal rate and regular rhythm.      Heart sounds: Normal heart sounds.   Pulmonary:      Effort: Pulmonary effort is normal. No respiratory distress.      Breath sounds: Normal breath sounds. No wheezing, rhonchi or rales.   Abdominal:      General: Bowel sounds are normal. There is no distension.      Palpations: There is no mass.      Tenderness: There is abdominal tenderness (mild) in the suprapubic area. There is no right CVA tenderness, left CVA tenderness, guarding or rebound.   Neurological:      Mental Status: She is alert and oriented to person, place, and time.   Psychiatric:         Behavior: Behavior normal.         Assessment & Plan     1. Suprapubic pressure  Comments:  Repeat UA today  Orders:  -     Urinalysis, Reflex to Urine Culture Urine, Clean Catch    2. Type 2 diabetes mellitus with other specified complication, without long-term current use of insulin  Assessment & Plan:  Controlled. Continue metformin      Orders:  -     Microalbumin/Creatinine Ratio, Urine; Future; Expected date: 09/07/2023    3. Essential hypertension  Assessment & Plan:  Stable. Continue lisinopril-hctz.      4. Paroxysmal atrial fibrillation  Assessment & Plan:  Rate controlled today. Continue amiodarone, Eliquis, and atenolol      Other orders  -     Urinalysis Microscopic            JAY JAY Yanez      Portions of this note may have been created with voice recognition software. Occasional "wrong-word" or "sound-a-like" substitutions may have occurred due to the inherent limitations of voice recognition software. Please, read the note carefully and recognize, using context, where substitutions have occurred.     "

## 2023-09-10 LAB — BACTERIA UR CULT: ABNORMAL

## 2023-09-11 RX ORDER — LISINOPRIL AND HYDROCHLOROTHIAZIDE 12.5; 2 MG/1; MG/1
2 TABLET ORAL DAILY
Qty: 60 TABLET | Refills: 0 | Status: SHIPPED | OUTPATIENT
Start: 2023-09-11 | End: 2023-10-13

## 2023-09-14 ENCOUNTER — LAB VISIT (OUTPATIENT)
Dept: LAB | Facility: HOSPITAL | Age: 78
End: 2023-09-14
Attending: NURSE PRACTITIONER
Payer: MEDICARE

## 2023-09-14 DIAGNOSIS — N30.01 ACUTE CYSTITIS WITH HEMATURIA: ICD-10-CM

## 2023-09-14 DIAGNOSIS — E11.69 TYPE 2 DIABETES MELLITUS WITH OTHER SPECIFIED COMPLICATION, WITHOUT LONG-TERM CURRENT USE OF INSULIN: ICD-10-CM

## 2023-09-14 LAB
BILIRUB UR QL STRIP: NEGATIVE
BMD RECOMMENDATION EXT: NORMAL
CLARITY UR REFRACT.AUTO: CLEAR
COLOR UR AUTO: YELLOW
GLUCOSE UR QL STRIP: NEGATIVE
HGB UR QL STRIP: ABNORMAL
KETONES UR QL STRIP: NEGATIVE
LEUKOCYTE ESTERASE UR QL STRIP: NEGATIVE
MICROSCOPIC COMMENT: NORMAL
NITRITE UR QL STRIP: NEGATIVE
PH UR STRIP: 6 [PH] (ref 5–8)
PROT UR QL STRIP: NEGATIVE
RBC #/AREA URNS AUTO: 2 /HPF (ref 0–4)
SP GR UR STRIP: >=1.03 (ref 1–1.03)
URN SPEC COLLECT METH UR: ABNORMAL
UROBILINOGEN UR STRIP-ACNC: NEGATIVE EU/DL

## 2023-09-14 PROCEDURE — 81000 URINALYSIS NONAUTO W/SCOPE: CPT | Mod: PO | Performed by: NURSE PRACTITIONER

## 2023-09-24 ENCOUNTER — HOSPITAL ENCOUNTER (EMERGENCY)
Facility: HOSPITAL | Age: 78
Discharge: HOME | End: 2023-09-24
Attending: EMERGENCY MEDICINE
Payer: MEDICARE

## 2023-09-24 VITALS
RESPIRATION RATE: 20 BRPM | DIASTOLIC BLOOD PRESSURE: 63 MMHG | HEART RATE: 50 BPM | TEMPERATURE: 98 F | OXYGEN SATURATION: 99 % | HEIGHT: 59 IN | WEIGHT: 146.38 LBS | SYSTOLIC BLOOD PRESSURE: 129 MMHG | BODY MASS INDEX: 29.51 KG/M2

## 2023-09-24 DIAGNOSIS — K43.9 HERNIA OF ANTERIOR ABDOMINAL WALL: ICD-10-CM

## 2023-09-24 DIAGNOSIS — K80.20 CALCULUS OF GALLBLADDER WITHOUT CHOLECYSTITIS WITHOUT OBSTRUCTION: ICD-10-CM

## 2023-09-24 DIAGNOSIS — M25.551 RIGHT HIP PAIN: Primary | ICD-10-CM

## 2023-09-24 DIAGNOSIS — S30.1XXD ABDOMINAL WALL SEROMA, SUBSEQUENT ENCOUNTER: ICD-10-CM

## 2023-09-24 PROCEDURE — 99284 EMERGENCY DEPT VISIT MOD MDM: CPT | Mod: 25,ER

## 2023-09-24 PROCEDURE — 25000003 PHARM REV CODE 250: Mod: ER | Performed by: EMERGENCY MEDICINE

## 2023-09-24 RX ORDER — LIDOCAINE 50 MG/G
1 PATCH TOPICAL DAILY PRN
Qty: 30 PATCH | Refills: 0 | Status: SHIPPED | OUTPATIENT
Start: 2023-09-24 | End: 2023-10-24

## 2023-09-24 RX ORDER — LIDOCAINE 50 MG/G
1 PATCH TOPICAL
Status: DISCONTINUED | OUTPATIENT
Start: 2023-09-24 | End: 2023-09-24 | Stop reason: HOSPADM

## 2023-09-24 RX ORDER — HYDROCODONE BITARTRATE AND ACETAMINOPHEN 5; 325 MG/1; MG/1
1 TABLET ORAL
Status: COMPLETED | OUTPATIENT
Start: 2023-09-24 | End: 2023-09-24

## 2023-09-24 RX ORDER — HYDROCODONE BITARTRATE AND ACETAMINOPHEN 5; 325 MG/1; MG/1
1 TABLET ORAL EVERY 12 HOURS PRN
Qty: 12 TABLET | Refills: 0 | Status: SHIPPED | OUTPATIENT
Start: 2023-09-24 | End: 2023-10-06

## 2023-09-24 RX ADMIN — HYDROCODONE BITARTRATE AND ACETAMINOPHEN 1 TABLET: 5; 325 TABLET ORAL at 08:09

## 2023-09-24 RX ADMIN — LIDOCAINE 1 PATCH: 50 PATCH TOPICAL at 08:09

## 2023-09-24 NOTE — ED PROVIDER NOTES
Emergency Medicine Provider Note - 2023    SCRIBE #1 NOTE: I, Vee Llanos, am scribing for, and in the presence of, No att. providers found. I have scribed the entire note.       History     Chief Complaint   Patient presents with    Back Pain     R lower back and hip pain with intermittent pain radiating down RLE. Reports that she did clean house on Friday and Saturday           History of Present Illness   HPI    2023, 8:23 AM  The history is provided by the patient    Guillermina Person is a 78 y.o. female presenting to the ED for right posterior pain.  Patient reports that she has pain to the back that radiates down to the back of the thigh.  Onset 2 days ago.  She does report that she cleaned house on Friday and Saturday.  Patient denies any bowel or bladder incontinence, perirectal paresthesia, fever, rash, weakness of the leg.  She does note some type of tumors that are growing in her abdomen.  Her doctor states that these are hernias.  Patient is on Eliquis      Arrival mode:  Personal Vehicle      PCP: Thomas Pimentel MD     Allergies:  Review of patient's allergies indicates:   Allergen Reactions    Augmentin [amoxicillin-pot clavulanate]      Fixed drug reaction      Amoxicillin Itching, Rash and Other (See Comments)     Yeast infection       Past Medical History:  Past Medical History:   Diagnosis Date    Atrial flutter     Brain tumor (benign)     Cancer     Coronary artery disease     Diabetes mellitus     High cholesterol     Hypertension        Past Surgical History:  Past Surgical History:   Procedure Laterality Date    ABDOMINAL SURGERY      APPENDECTOMY      BRAIN SURGERY       SECTION      HYSTERECTOMY      TONSILLECTOMY           Family History:  History reviewed. No pertinent family history.    Social History:  Social History     Tobacco Use    Smoking status: Never    Smokeless tobacco: Not on file   Substance and Sexual Activity    Alcohol use: No    Drug use: No     Sexual activity: Not on file       Triage note, Allergies, Past Medical History, Past Surgical History, Family History and Social History reviewed as documented above.     Review of Systems   Review of Systems   Constitutional:  Negative for fever.   HENT:  Negative for sore throat.    Respiratory:  Negative for shortness of breath.    Cardiovascular:  Negative for chest pain.   Gastrointestinal:  Negative for abdominal pain, nausea and vomiting.   Genitourinary:  Negative for dysuria.        Denies perirectal paresthesia, loss of control of bowel or bladder, weakness of the legs.   Musculoskeletal:  Positive for back pain.   Skin:  Negative for rash.   Neurological:  Negative for weakness.   Hematological:  Does not bruise/bleed easily.          Physical Exam     Initial Vitals [09/24/23 0740]   BP Pulse Resp Temp SpO2   135/77 60 18 98.2 °F (36.8 °C) 95 %      MAP       --          Physical Exam  Abdominal:       Skin:               Nursing Notes and Vital Signs Reviewed.  Constitutional: Patient is in no apparent distress. Well-developed and well-nourished.  Head: Atraumatic. Normocephalic.  Eyes: PERRL. EOM intact. Conjunctivae are not pale. No scleral icterus.  ENT: Mucous membranes are moist. Oropharynx is clear and symmetric.    Neck: Supple. Full ROM. No lymphadenopathy.  Cardiovascular: Regular rate. Regular rhythm. No murmurs, rubs, or gallops. Distal pulses are 2+ and symmetric.  Pulmonary/Chest: No respiratory distress. Clear to auscultation bilaterally. No wheezing or rales.  Abdominal: Soft and non-distended.  There is no tenderness.  No rebound, guarding, or rigidity. Good bowel sounds.  Hernias are noted anteriorly.  Soft.  Genitourinary: No CVA tenderness  Musculoskeletal: Moves all extremities. No obvious deformities. No edema. No calf tenderness.  No midline T-spine tenderness, no midline L-spine tenderness.  Right lower extremity:  Plantar flexion dorsiflexion +5/5 equal bilaterally.  Cap refill  "less than 3 seconds.  Toes pink.  Able to ambulate.  Skin: Warm and dry.  No shingles.  Neurological:  Alert, awake, and appropriate.  Normal speech.  No acute focal neurological deficits are appreciated.  Psychiatric: Normal affect. Good eye contact. Appropriate in content.     ED Course     ED Procedures:  Procedures    ED Vital Signs:  Vitals:    09/24/23 0740 09/24/23 0844 09/24/23 0940   BP: 135/77  129/63   Pulse: 60  (!) 50   Resp: 18 18 20   Temp: 98.2 °F (36.8 °C)     TempSrc: Oral     SpO2: 95%  99%   Weight: 66.4 kg (146 lb 6.2 oz)     Height: 4' 11" (1.499 m)         Abnormal Lab Results:  Labs Reviewed - No data to display     All Lab Results:  None    Imaging Results:  Imaging Results              CT Abdomen Pelvis  Without Contrast (Final result)  Result time 09/24/23 10:04:12      Final result by Jose Wheeler MD (09/24/23 10:04:12)                   Impression:      No acute abnormality evident in the abdomen or pelvis.  Possible diarrheal illness.    Cholelithiasis.    Stable ovoid focus at the anterior abdominal wall.  Stable herniated bladder through the inferior anterior abdominal wall.    Additional findings detailed above.      Electronically signed by: Jose Wheeler  Date:    09/24/2023  Time:    10:04               Narrative:    EXAMINATION:  CT ABDOMEN PELVIS WITHOUT CONTRAST    CLINICAL HISTORY:  right lower back pain, hx of masses;    TECHNIQUE:  Low dose axial images, sagittal and coronal reformations were obtained from the lung bases to the pubic symphysis.  Oral contrast was not administered.  All CT scans at this location are performed using dose modulation techniques as appropriate to a performed exam including the following: Automated exposure control; adjustment of the mA and/or kV according to patient size (this includes techniques or standardized protocols for targeted exams where dose is matched to indication/reason for exam; i. e. extremities or head); use of iterative " reconstruction technique.    COMPARISON:  CT abdomen pelvis 05/31/2023.    FINDINGS:  LOWER CHEST: Mildly enlarged heart.  Severe coronary artery atherosclerotic calcification.    HEPATOBILIARY: Unremarkable liver. 1 cm peripherally calcified gallstone at the gallbladder neck region.  No inflammatory changes.  Unremarkable bile ducts.    SPLEEN/ADRENAL/PANCREAS: Unremarkable.    GENITOURINARY: No nephrolithiasis or obstructive uropathy.  Unremarkable ureters.  Stable herniation of a portion of the anterior bladder through the inferior rectus musculature.  Hysterectomy status.    RETROPERITONEUM:  No pathologic adenopathy.    BOWEL: Small amount of fluid in the distal colon/rectum, possible diarrheal illness.  No bowel obstruction or inflammation evident.  No free air or ascites.    VASCULAR: No abdominal aortic aneurysm. Moderate scattered atherosclerotic calcification.    BODY WALL: Ovoid focus at the midline anterior abdominal wall measures similar when compared to the prior examination.  Possibly postoperative.    BONES: No acute fracture or aggressive osseous lesion. Discogenic degenerative changes.                                            The Emergency Provider reviewed the vital signs and test results, which are outlined above.     ED Discussion     ED Course as of 09/24/23 1708   Sun Sep 24, 2023   1038 Results of seroma, hernia, and hernia bladder discussed with patient.  Patient is requesting referral for outpatient Urology.  Discussed indications to return to emergency department [LB]      ED Course User Index  [LB] Vee Llanos, DO            10:38  Reassessment: Dr. Llanos reassessed the pt.  The pt is resting comfortably and is NAD.  Pt states their sx have improved. Discussed test results, shared treatment plan, specific conditions for return, and the need for f/u.  Answered their questions at this time.  Pt understands and agrees to the plan.  The pt has remained hemodynamically stable  through ED course and is stable for discharge.      I discussed with patient and/or family/caretaker that evaluation in the ED does not suggest any emergent or life threatening medical conditions requiring immediate intervention beyond what was provided in the ED, and I believe patient is safe for discharge.  Regardless, an unremarkable evaluation in the ED does not preclude the development or presence of a serious of life threatening condition. As such, patient was instructed to return immediately for any worsening or change in current symptoms.      ED Medication(s):  Medications   HYDROcodone-acetaminophen 5-325 mg per tablet 1 tablet (1 tablet Oral Given 9/24/23 0844)         Medical Decision Making   Medical Decision Making  Back pain.  History of lumps in abdomen.  No recent imaging.  No loss of control of bowel or bladder, perirectal paresthesia, abdominal pain, fever.    Differential diagnosis includes metastatic cancer, arthritis, muscle spasm    Patient underwent imaging.  Radiology report was notable for gallstone, seroma, bladder hernia.  Patient and spouse were made aware of these findings.  Outpatient referral was made for urology.  Patient currently on Eliquis-therefore NSAIDs contraindicated.  Patient given small dose of narcotic pain medication.  Patient was educated on safe narcotic medication use.  Discussed indications to return to emergency department.    Amount and/or Complexity of Data Reviewed  Radiology: ordered.     Details: Radiology report reviewed    Risk  Prescription drug management.          Discussed case with:N/A         Scribe Attestation:   Scribe #1: I performed the above scribed service and the documentation accurately describes the services I performed. I attest to the accuracy of the note.    Attending:   Physician Attestation Statement for Scribe #1: I, No att. providers found, personally performed the services described in this documentation, as scribed by Vee Llanos,  "in my presence, and it is both accurate and complete.           MIPS Measures     Smoker? No     Hypertension: History of Hypertension: The patient has elevated blood pressure (higher than 120/80) while being treated in the ED but has a history of hypertension.      Portions of this note may have been created with voice recognition software. Occasional "wrong-word" or "sound-a-like" substitutions may have occurred due to the inherent limitations of voice recognition software. Please, read the note carefully and recognize, using context, where substitutions have occurred.            Clinical Impression       ICD-10-CM ICD-9-CM   1. Right hip pain  M25.551 719.45   2. Hernia of anterior abdominal wall, containing urinary bladder  K43.9 553.20   3. Abdominal wall seroma, subsequent encounter  S30.1XXD V58.89     998.13   4. Calculus of gallbladder without cholecystitis without obstruction  K80.20 574.20         ED Disposition       Disposition: Discharge to home  Patient condition: Good    Medication List     Medication List        START taking these medications      LIDOcaine 5 %  Commonly known as: LIDODERM  Place 1 patch onto the skin daily as needed. Remove & Discard patch within 12 hours or as directed by MD            CHANGE how you take these medications      HYDROcodone-acetaminophen 5-325 mg per tablet  Commonly known as: NORCO  Take 1 tablet by mouth every 12 (twelve) hours as needed for Pain.  What changed: when to take this            ASK your doctor about these medications      albuterol 90 mcg/actuation inhaler  Commonly known as: PROVENTIL/VENTOLIN HFA  Inhale 1-2 puffs into the lungs every 6 (six) hours as needed for Wheezing. Rescue     amiodarone 100 MG Tab  Commonly known as: PACERONE  Take 1 tablet (100 mg total) by mouth once daily.     apixaban 5 mg Tab  Commonly known as: ELIQUIS     atenoloL 25 MG tablet  Commonly known as: TENORMIN     blood sugar diagnostic Strp     blood-glucose meter kit   "   fish-flx-prm-blkbor-om 3,6,9 5 400-400-200 mg Cap     furosemide 40 MG tablet  Commonly known as: LASIX     lancets 33 gauge Misc     lisinopriL-hydrochlorothiazide 20-12.5 mg per tablet  Commonly known as: PRINZIDE,ZESTORETIC  Take 2 tablets by mouth once daily.     loratadine 10 mg dissolvable tablet  Commonly known as: CLARITIN REDITABS     metFORMIN 500 MG tablet  Commonly known as: GLUCOPHAGE  Take 1 tablet (500 mg total) by mouth once daily.     pulse oximeter device  Commonly known as: pulse oximeter  Use twice daily at 8 AM and 3 PM and [facility] as directed.     simvastatin 20 MG tablet  Commonly known as: ZOCOR  Take 1 tablet (20 mg total) by mouth every evening.               Where to Get Your Medications        These medications were sent to [id number] - [place], [place] - [address]  [address], [place] [address]      Phone: [phone]   HYDROcodone-acetaminophen 5-325 mg per tablet  LIDOcaine 5 %         ED Follow-up   Follow-up Information       Juan Rutherford MD In 2 days.    Specialty: Urology  Why: For herniation of urinary bladder.  Return to emergency department for loss of control of bowel or bladder, weakness of the legs, rash, discoloration of the foot, unable to urinate, vomiting, or other concerns  Contact information:  [address] [facility] [place] [facility]  [place] [address]  [phone]               Thomas Pimentel MD In 2 days.    Specialties: Internal Medicine, Family Medicine  Contact information:  [address] [address]  [place] [address]  [phone]                                      Vee Llanos,   09/24/23 0595

## 2023-09-26 ENCOUNTER — OFFICE VISIT (OUTPATIENT)
Dept: UROLOGY | Facility: CLINIC | Age: 78
End: 2023-09-26
Payer: MEDICARE

## 2023-09-26 VITALS
SYSTOLIC BLOOD PRESSURE: 156 MMHG | DIASTOLIC BLOOD PRESSURE: 78 MMHG | HEART RATE: 51 BPM | WEIGHT: 146 LBS | BODY MASS INDEX: 29.49 KG/M2

## 2023-09-26 DIAGNOSIS — K43.9 HERNIA OF ANTERIOR ABDOMINAL WALL: ICD-10-CM

## 2023-09-26 PROCEDURE — 99999 PR PBB SHADOW E&M-EST. PATIENT-LVL V: CPT | Mod: PBBFAC,,, | Performed by: UROLOGY

## 2023-09-26 PROCEDURE — 99999 PR PBB SHADOW E&M-EST. PATIENT-LVL V: ICD-10-PCS | Mod: PBBFAC,,, | Performed by: UROLOGY

## 2023-09-26 PROCEDURE — 1159F MED LIST DOCD IN RCRD: CPT | Mod: CPTII,S$GLB,, | Performed by: UROLOGY

## 2023-09-26 PROCEDURE — 3077F PR MOST RECENT SYSTOLIC BLOOD PRESSURE >= 140 MM HG: ICD-10-PCS | Mod: CPTII,S$GLB,, | Performed by: UROLOGY

## 2023-09-26 PROCEDURE — 1160F PR REVIEW ALL MEDS BY PRESCRIBER/CLIN PHARMACIST DOCUMENTED: ICD-10-PCS | Mod: CPTII,S$GLB,, | Performed by: UROLOGY

## 2023-09-26 PROCEDURE — 99203 OFFICE O/P NEW LOW 30 MIN: CPT | Mod: S$GLB,,, | Performed by: UROLOGY

## 2023-09-26 PROCEDURE — 3078F DIAST BP <80 MM HG: CPT | Mod: CPTII,S$GLB,, | Performed by: UROLOGY

## 2023-09-26 PROCEDURE — 3288F PR FALLS RISK ASSESSMENT DOCUMENTED: ICD-10-PCS | Mod: CPTII,S$GLB,, | Performed by: UROLOGY

## 2023-09-26 PROCEDURE — 1126F AMNT PAIN NOTED NONE PRSNT: CPT | Mod: CPTII,S$GLB,, | Performed by: UROLOGY

## 2023-09-26 PROCEDURE — 3288F FALL RISK ASSESSMENT DOCD: CPT | Mod: CPTII,S$GLB,, | Performed by: UROLOGY

## 2023-09-26 PROCEDURE — 1126F PR PAIN SEVERITY QUANTIFIED, NO PAIN PRESENT: ICD-10-PCS | Mod: CPTII,S$GLB,, | Performed by: UROLOGY

## 2023-09-26 PROCEDURE — 99203 PR OFFICE/OUTPT VISIT, NEW, LEVL III, 30-44 MIN: ICD-10-PCS | Mod: S$GLB,,, | Performed by: UROLOGY

## 2023-09-26 PROCEDURE — 1159F PR MEDICATION LIST DOCUMENTED IN MEDICAL RECORD: ICD-10-PCS | Mod: CPTII,S$GLB,, | Performed by: UROLOGY

## 2023-09-26 PROCEDURE — 3078F PR MOST RECENT DIASTOLIC BLOOD PRESSURE < 80 MM HG: ICD-10-PCS | Mod: CPTII,S$GLB,, | Performed by: UROLOGY

## 2023-09-26 PROCEDURE — 1160F RVW MEDS BY RX/DR IN RCRD: CPT | Mod: CPTII,S$GLB,, | Performed by: UROLOGY

## 2023-09-26 PROCEDURE — 3077F SYST BP >= 140 MM HG: CPT | Mod: CPTII,S$GLB,, | Performed by: UROLOGY

## 2023-09-26 PROCEDURE — 1100F PTFALLS ASSESS-DOCD GE2>/YR: CPT | Mod: CPTII,S$GLB,, | Performed by: UROLOGY

## 2023-09-26 PROCEDURE — 1100F PR PT FALLS ASSESS DOC 2+ FALLS/FALL W/INJURY/YR: ICD-10-PCS | Mod: CPTII,S$GLB,, | Performed by: UROLOGY

## 2023-09-26 NOTE — PROGRESS NOTES
Chief Complaint:  Abdominal hernia containing urinary bladder    HPI:   Guillermina Person is a 78 y.o. female that presents today for evaluation.  Patient presented to the ED two days ago with back and hip pain.  A CT was obtained which revealed a lower abdominal wall hernia containing her urinary bladder.  Patient notes that she had a UTI at the beginning of this month, has not had a UTI in several years.  Denies any voiding issues or gross hematuria.  Denies family history of  cancers.  Notes that she had surgery for hernias in  but wants to get her hernias fixed as they bother her.      PMH:  Past Medical History:   Diagnosis Date    Atrial flutter     Brain tumor (benign)     Cancer     Coronary artery disease     Diabetes mellitus     High cholesterol     Hypertension        PSH:  Past Surgical History:   Procedure Laterality Date    ABDOMINAL SURGERY      APPENDECTOMY      BRAIN SURGERY       SECTION      HYSTERECTOMY      TONSILLECTOMY         Family History:  No family history on file.    Social History:  Social History     Tobacco Use    Smoking status: Never   Substance Use Topics    Alcohol use: No    Drug use: No        Review of Systems:  General: No fever, chills  Skin: No rashes  Chest:  Denies cough and sputum production  Heart: Denies chest pain  Resp: Denies dyspnea  Abdomen: Denies diarrhea, abdominal pain, hematemesis, or blood in stool.  Musculoskeletal: No joint stiffness or swelling. Denies back pain.  : see HPI  Neuro: no dizziness or weakness    Allergies:  Augmentin [amoxicillin-pot clavulanate] and Amoxicillin    Medications:    Current Outpatient Medications:     albuterol (PROVENTIL/VENTOLIN HFA) 90 mcg/actuation inhaler, Inhale 1-2 puffs into the lungs every 6 (six) hours as needed for Wheezing. Rescue, Disp: 8 g, Rfl: 0    amiodarone (PACERONE) 100 MG Tab, Take 1 tablet (100 mg total) by mouth once daily., Disp: 90 tablet, Rfl: 1    apixaban (ELIQUIS) 5 mg Tab, Take 5 mg  by mouth 2 (two) times daily., Disp: , Rfl:     atenoloL (TENORMIN) 25 MG tablet, Take 1 tablet by mouth once daily., Disp: , Rfl:     blood sugar diagnostic Strp, Test one time daily, Disp: , Rfl:     blood-glucose meter kit, USE AS DIRECTED, Disp: , Rfl:     fish-flx-prm-blkbor-om 3,6,9 5 400-400-200 mg Cap, Take 1 tablet by mouth once daily., Disp: , Rfl:     furosemide (LASIX) 40 MG tablet, Take 40 mg by mouth daily as needed., Disp: , Rfl:     HYDROcodone-acetaminophen (NORCO) 5-325 mg per tablet, Take 1 tablet by mouth every 12 (twelve) hours as needed for Pain., Disp: 12 tablet, Rfl: 0    lancets 33 gauge Misc, Test one time daily, Disp: , Rfl:     LIDOcaine (LIDODERM) 5 %, Place 1 patch onto the skin daily as needed. Remove & Discard patch within 12 hours or as directed by MD, Disp: 30 patch, Rfl: 0    lisinopriL-hydrochlorothiazide (PRINZIDE,ZESTORETIC) 20-12.5 mg per tablet, Take 2 tablets by mouth once daily., Disp: 60 tablet, Rfl: 0    loratadine (CLARITIN REDITABS) 10 mg dissolvable tablet, Take 1 tablet by mouth daily as needed., Disp: , Rfl:     metFORMIN (GLUCOPHAGE) 500 MG tablet, Take 1 tablet (500 mg total) by mouth once daily., Disp: 90 tablet, Rfl: 1    pulse oximeter (PULSE OXIMETER) device, Use twice daily at 8 AM and 3 PM and [facility] as directed., Disp: 1 each, Rfl: 0    simvastatin (ZOCOR) 20 MG tablet, Take 1 tablet (20 mg total) by mouth every evening., Disp: 90 tablet, Rfl: 1    Physical Exam:  Vitals:    09/26/23 0807   BP: (!) 156/78   Pulse: (!) 51     Body mass index is 29.49 kg/m².  General: awake, alert, cooperative  Head: NC/AT  Ears: external ears normal  Eyes: sclera normal  Lungs: normal inspiration, NAD  Heart: well-perfused  Abdomen: Soft, NT, ND, lower abdominal wall hernia noted  Skin: The skin is warm and dry  Ext: No c/c/e.  Neuro: grossly intact, AOx3    RADIOLOGY:  CT ABDOMEN PELVIS WITHOUT CONTRAST 09/24/2023     CLINICAL HISTORY:  right lower back  pain, hx of masses;     TECHNIQUE:  Low dose axial images, sagittal and coronal reformations were obtained from the lung bases to the pubic symphysis.  Oral contrast was not administered.  All CT scans at this location are performed using dose modulation techniques as appropriate to a performed exam including the following: Automated exposure control; adjustment of the mA and/or kV according to patient size (this includes techniques or standardized protocols for targeted exams where dose is matched to indication/reason for exam; i. e. extremities or head); use of iterative reconstruction technique.     COMPARISON:  CT abdomen pelvis 05/31/2023.     FINDINGS:  LOWER CHEST: Mildly enlarged heart.  Severe coronary artery atherosclerotic calcification.     HEPATOBILIARY: Unremarkable liver. 1 cm peripherally calcified gallstone at the gallbladder neck region.  No inflammatory changes.  Unremarkable bile ducts.     SPLEEN/ADRENAL/PANCREAS: Unremarkable.     GENITOURINARY: No nephrolithiasis or obstructive uropathy.  Unremarkable ureters.  Stable herniation of a portion of the anterior bladder through the inferior rectus musculature.  Hysterectomy status.     RETROPERITONEUM:  No pathologic adenopathy.     BOWEL: Small amount of fluid in the distal colon/rectum, possible diarrheal illness.  No bowel obstruction or inflammation evident.  No free air or ascites.     VASCULAR: No abdominal aortic aneurysm. Moderate scattered atherosclerotic calcification.     BODY WALL: Ovoid focus at the midline anterior abdominal wall measures similar when compared to the prior examination.  Possibly postoperative.     BONES: No acute fracture or aggressive osseous lesion. Discogenic degenerative changes.     Impression:     No acute abnormality evident in the abdomen or pelvis.  Possible diarrheal illness.     Cholelithiasis.     Stable ovoid focus at the anterior abdominal wall.  Stable herniated bladder through the inferior anterior  abdominal wall.     Additional findings detailed above.    LABS:  I personally reviewed the following lab values:  Lab Results   Component Value Date    WBC 6.3 02/27/2023    HGB 13.3 02/27/2023    HCT 40.9 02/27/2023     02/27/2023     07/17/2020    K 3.5 07/17/2020     07/17/2020    CREATININE 1.0 05/31/2023    BUN 12 07/17/2020    CO2 27 07/17/2020    TSH 2.470 02/27/2023    INR 1.6 07/17/2020    HGBA1C 5.4 02/27/2023    CHOL 178 02/27/2023    TRIG 88 02/27/2023    HDL 68 02/27/2023     Assessment/Plan:   Guillermina Person is a 78 y.o. female with:    Lower abdominal wall hernia containing urinary bladder - refer to General surgery for discussion    UTIs - possibly due to incomplete emptying due to her hernia but has had no UTI issues in several years, would hold off on medications at this time    Juan Rutherford MD  Urology

## 2023-09-28 ENCOUNTER — OFFICE VISIT (OUTPATIENT)
Dept: SURGERY | Facility: CLINIC | Age: 78
End: 2023-09-28
Payer: MEDICARE

## 2023-09-28 VITALS
WEIGHT: 148.38 LBS | SYSTOLIC BLOOD PRESSURE: 168 MMHG | HEART RATE: 51 BPM | BODY MASS INDEX: 31.01 KG/M2 | DIASTOLIC BLOOD PRESSURE: 72 MMHG

## 2023-09-28 DIAGNOSIS — I10 ESSENTIAL HYPERTENSION: Primary | ICD-10-CM

## 2023-09-28 DIAGNOSIS — Z85.42 HISTORY OF UTERINE CANCER: ICD-10-CM

## 2023-09-28 DIAGNOSIS — I48.0 PAROXYSMAL ATRIAL FIBRILLATION: ICD-10-CM

## 2023-09-28 DIAGNOSIS — K43.9 HERNIA OF ANTERIOR ABDOMINAL WALL: ICD-10-CM

## 2023-09-28 PROCEDURE — 3077F SYST BP >= 140 MM HG: CPT | Mod: CPTII,S$GLB,, | Performed by: SURGERY

## 2023-09-28 PROCEDURE — 99204 PR OFFICE/OUTPT VISIT, NEW, LEVL IV, 45-59 MIN: ICD-10-PCS | Mod: S$GLB,,, | Performed by: SURGERY

## 2023-09-28 PROCEDURE — 1126F PR PAIN SEVERITY QUANTIFIED, NO PAIN PRESENT: ICD-10-PCS | Mod: CPTII,S$GLB,, | Performed by: SURGERY

## 2023-09-28 PROCEDURE — 1159F MED LIST DOCD IN RCRD: CPT | Mod: CPTII,S$GLB,, | Performed by: SURGERY

## 2023-09-28 PROCEDURE — 1160F RVW MEDS BY RX/DR IN RCRD: CPT | Mod: CPTII,S$GLB,, | Performed by: SURGERY

## 2023-09-28 PROCEDURE — 3078F PR MOST RECENT DIASTOLIC BLOOD PRESSURE < 80 MM HG: ICD-10-PCS | Mod: CPTII,S$GLB,, | Performed by: SURGERY

## 2023-09-28 PROCEDURE — 3077F PR MOST RECENT SYSTOLIC BLOOD PRESSURE >= 140 MM HG: ICD-10-PCS | Mod: CPTII,S$GLB,, | Performed by: SURGERY

## 2023-09-28 PROCEDURE — 1160F PR REVIEW ALL MEDS BY PRESCRIBER/CLIN PHARMACIST DOCUMENTED: ICD-10-PCS | Mod: CPTII,S$GLB,, | Performed by: SURGERY

## 2023-09-28 PROCEDURE — 99999 PR PBB SHADOW E&M-EST. PATIENT-LVL IV: CPT | Mod: PBBFAC,,, | Performed by: SURGERY

## 2023-09-28 PROCEDURE — 99999 PR PBB SHADOW E&M-EST. PATIENT-LVL IV: ICD-10-PCS | Mod: PBBFAC,,, | Performed by: SURGERY

## 2023-09-28 PROCEDURE — 1126F AMNT PAIN NOTED NONE PRSNT: CPT | Mod: CPTII,S$GLB,, | Performed by: SURGERY

## 2023-09-28 PROCEDURE — 99204 OFFICE O/P NEW MOD 45 MIN: CPT | Mod: S$GLB,,, | Performed by: SURGERY

## 2023-09-28 PROCEDURE — 3078F DIAST BP <80 MM HG: CPT | Mod: CPTII,S$GLB,, | Performed by: SURGERY

## 2023-09-28 PROCEDURE — 1159F PR MEDICATION LIST DOCUMENTED IN MEDICAL RECORD: ICD-10-PCS | Mod: CPTII,S$GLB,, | Performed by: SURGERY

## 2023-09-28 NOTE — LETTER
September 28, 2023        Thomas Pimentel MD  [id number] [address] [address]             [address]  [address] [facility] [address]  Phone: [phone]  Fax: [phone]   Patient: Guillermina Person   MR Number: [id number]   YOB: 1945   Date of Visit: 9/28/2023       Dear Dr. Pimentel:    Thank you for referring Guillermina Person to me for evaluation. Below are the relevant portions of my assessment and plan of care.            If you have questions, please do not hesitate to call me. I look forward to following Guillermina along with you.    Sincerely,      Magdaleno Fernando MD           CC    No Recipients

## 2023-09-28 NOTE — PROGRESS NOTES
Patient ID: Guillermina Person is a 78 y.o. female.    Chief Complaint:  Incisional hernia    HPI:  78-year-old female was sent for evaluation of ventral hernias.      She had a ventral hernia repaired many years ago with [place]-German mesh.  She has a stable seroma.  She has a new ventral hernia in the lower abdomen that has bladder within it.  This hernia is an incision from a  and then a subsequent gynecology surgery in .    She was seen by Urology and a general surgical evaluation was requested.      The patient was seen at our [facility] by Dr. Emerson in  of this year and observation was recommended.      Currently she denies any symptoms.  She says that her urinary tract infection has resolved.      She is anxious about any surgical intervention and does not necessarily desire to pursue surgery      Review of Systems   Constitutional:  Negative for appetite change, chills, fatigue, fever and unexpected weight change.   HENT:  Negative for hearing loss and rhinorrhea.    Eyes:  Negative for visual disturbance.   Respiratory:  Negative for apnea, cough, shortness of breath and wheezing.    Cardiovascular:  Negative for chest pain and palpitations.   Gastrointestinal:  Negative for abdominal distention, abdominal pain, blood in stool, constipation, diarrhea, nausea and vomiting.        Bulges of her abdominal wall   Genitourinary:  Negative for dysuria, frequency and urgency.   Musculoskeletal:  Negative for arthralgias and neck pain.   Skin:  Negative for rash.   Neurological:  Negative for seizures, weakness, numbness and headaches.   Hematological:  Negative for adenopathy. Does not bruise/bleed easily.   Psychiatric/Behavioral:  Negative for hallucinations. The patient is not nervous/anxious.        Current Outpatient Medications   Medication Sig Dispense Refill    albuterol (PROVENTIL/VENTOLIN HFA) 90 mcg/actuation inhaler Inhale 1-2 puffs into the lungs every 6 (six) hours as needed for  Wheezing. Rescue 8 g 0    amiodarone (PACERONE) 100 MG Tab Take 1 tablet (100 mg total) by mouth once daily. 90 tablet 1    apixaban (ELIQUIS) 5 mg Tab Take 5 mg by mouth 2 (two) times daily.      atenoloL (TENORMIN) 25 MG tablet Take 1 tablet by mouth once daily.      blood sugar diagnostic Strp Test one time daily      blood-glucose meter kit USE AS DIRECTED      fish-flx-prm-blkbor-om 3,6,9 5 400-400-200 mg Cap Take 1 tablet by mouth once daily.      furosemide (LASIX) 40 MG tablet Take 40 mg by mouth daily as needed.      HYDROcodone-acetaminophen (NORCO) 5-325 mg per tablet Take 1 tablet by mouth every 12 (twelve) hours as needed for Pain. 12 tablet 0    lancets 33 gauge Misc Test one time daily      LIDOcaine (LIDODERM) 5 % Place 1 patch onto the skin daily as needed. Remove & Discard patch within 12 hours or as directed by MD 30 patch 0    lisinopriL-hydrochlorothiazide (PRINZIDE,ZESTORETIC) 20-12.5 mg per tablet Take 2 tablets by mouth once daily. 60 tablet 0    loratadine (CLARITIN REDITABS) 10 mg dissolvable tablet Take 1 tablet by mouth daily as needed.      metFORMIN (GLUCOPHAGE) 500 MG tablet Take 1 tablet (500 mg total) by mouth once daily. 90 tablet 1    pulse oximeter (PULSE OXIMETER) device Use twice daily at 8 AM and 3 PM and record the value in [employer] as directed. 1 each 0    simvastatin (ZOCOR) 20 MG tablet Take 1 tablet (20 mg total) by mouth every evening. 90 tablet 1     No current facility-administered medications for this visit.       Review of patient's allergies indicates:   Allergen Reactions    Augmentin [amoxicillin-pot clavulanate]      Fixed drug reaction      Amoxicillin Itching, Rash and Other (See Comments)     Yeast infection       Past Medical History:   Diagnosis Date    Atrial flutter     Brain tumor (benign)     Cancer     Coronary artery disease     Diabetes mellitus     High cholesterol     Hypertension        Past Surgical History:   Procedure Laterality Date    ABDOMINAL  SURGERY      APPENDECTOMY      BRAIN SURGERY       SECTION      HYSTERECTOMY      TONSILLECTOMY         No family history on file.    Social History     Socioeconomic History    Marital status:    Tobacco Use    Smoking status: Never   Substance and Sexual Activity    Alcohol use: No    Drug use: No       There were no vitals filed for this visit.    Physical Exam  Vitals reviewed.   Constitutional:       Appearance: She is well-developed.   HENT:      Head: Normocephalic.   Eyes:      Pupils: Pupils are equal, round, and reactive to light.   Neck:      Thyroid: No thyromegaly.      Vascular: No JVD.      Trachea: No tracheal deviation.   Cardiovascular:      Rate and Rhythm: Normal rate and regular rhythm.      Heart sounds: Normal heart sounds.   Pulmonary:      Breath sounds: Normal breath sounds. No wheezing.   Abdominal:      General: Bowel sounds are normal. There is no distension.      Palpations: Abdomen is soft. Abdomen is not rigid. There is no mass.      Tenderness: There is no abdominal tenderness. There is no guarding or rebound.      Hernia: Hernia: there is a lower abdominal wall hernia.      Comments:  There is a fullness in the central abdomen   Musculoskeletal:         General: Normal range of motion.   Lymphadenopathy:      Cervical: No cervical adenopathy.   Skin:     General: Skin is warm and dry.      Findings: No erythema or rash.   Neurological:      Mental Status: She is oriented to person, place, and time.   EXAMINATION:  CT ABDOMEN PELVIS WITHOUT CONTRAST     CLINICAL HISTORY:  right lower back pain, hx of masses;     TECHNIQUE:  Low dose axial images, sagittal and coronal reformations were obtained from the lung bases to the pubic symphysis.  Oral contrast was not administered.  All CT scans at this location are performed using dose modulation techniques as appropriate to a performed exam including the following: Automated exposure control; adjustment of the mA and/or kV  according to patient size (this includes techniques or standardized protocols for targeted exams where dose is matched to indication/reason for exam; i. e. extremities or head); use of iterative reconstruction technique.     COMPARISON:  CT abdomen pelvis 2023.     FINDINGS:  LOWER CHEST: Mildly enlarged heart.  Severe coronary artery atherosclerotic calcification.     HEPATOBILIARY: Unremarkable liver. 1 cm peripherally calcified gallstone at the gallbladder neck region.  No inflammatory changes.  Unremarkable bile ducts.     SPLEEN/ADRENAL/PANCREAS: Unremarkable.     GENITOURINARY: No nephrolithiasis or obstructive uropathy.  Unremarkable ureters.  Stable herniation of a portion of the anterior bladder through the inferior rectus musculature.  Hysterectomy status.     RETROPERITONEUM:  No pathologic adenopathy.     BOWEL: Small amount of fluid in the distal colon/rectum, possible diarrheal illness.  No bowel obstruction or inflammation evident.  No free air or ascites.     VASCULAR: No abdominal aortic aneurysm. Moderate scattered atherosclerotic calcification.     BODY WALL: Ovoid focus at the midline anterior abdominal wall measures similar when compared to the prior examination.  Possibly postoperative.     BONES: No acute fracture or aggressive osseous lesion. Discogenic degenerative changes.     Impression:     No acute abnormality evident in the abdomen or pelvis.  Possible diarrheal illness.     Cholelithiasis.     Stable ovoid focus at the anterior abdominal wall.  Stable herniated bladder through the inferior anterior abdominal wall.     Additional findings detailed above.        Electronically signed by: Jose Wheeler  Date:                                            2023  Time:                                           10:04    Patient Name: JOSE LUIS MCCRAY G Patient ID: [id number] Account #:   : 4 (77y 3m) Gender: F Study Date: 2022 09:48:46 AM   Ht(Cm): 150 Wt(Kg): 80 BSA:  1.83 Accession #: [id number]   Sonographer: Tim Location: LCAPLOrder Provider: JOY TAYLOR     Quality: The study images were of technically adequate quality. Ref.Provider: JOY TAYLOR     -----------------------    CONCLUSIONS:   1. Normal left ventricular cavity size. Normal left ventricular systolic function. LVEF   55 - 65%. Moderate (Grade II) diastolic dysfunction (pseudonormal filling).   2. Normal right ventricular size. Normal right ventricular systolic function.   3. Mild mitral valve regurgitation.   4. Mild tricuspid valve regurgitation.   5. The ascending aorta is normal in size.     Surgery notes  from our [facility] were reviewed, follow-up in 1 year was recommended    Emergency room notes were reviewed    Urology note was reviewed    Assessment & Plan:    One.  Seroma that developed after her abdominal hernia repair with [place]-German mesh in 2013.  This has been stable for many years and I would not recommend any interventions as it would require an extensive abdominal surgery to address this issue.    2.  Lower abdominal wall hernia with incarcerated bladder.       Options include observation versus open repair with mesh.  The patient was not anxious to pursue surgical intervention    With the patient's comorbidities she is at increased risk.  She would need to stop her anticoagulation.  The need for bridging for her atrial flutter/atrial fibrillation.  She is been seen by Dr. Taylor previously but she is scheduled to see an [facility] cardiologist in December.      She would need a risk assessment for general anesthetic by the cardiology service to help her make a more informed decision regarding surgery.          The risks of surgery include infection, bleeding, injury to the bladder, mesh infection, wound infection, recurrence of the hernia.      The patient has chosen observation at this time.  She would only require surgery if she had incarceration of her bladder that prevented urination or she  had recurrent urinary tract infections and we were concerned about stasis of the urine in the herniated portion of the bladder.      This was discussed with with her and her .      She can follow-up with us in 1 year if she so desires.    Surgical follow-up for the hernia if symptoms develop

## 2023-09-28 NOTE — PATIENT INSTRUCTIONS
The fullness in the upper portion of your abdomen is something called a seroma.  This developed after your hernia repair in 2013.  I would not recommend any intervention for this.      The fullness in your right lower abdomen is a hernia of your bladder through your incision.  This hernia likely developed at the time of your hysterectomy.      We do not need to do any surgery for this unless you can not urinate because your entire bladder is stuck in it.      When you see your cardiologist in December you can ask them to discuss your risk of general anesthetic for hernia repair.      You can make an appointment to see us back in September of next year if you want us to recheck the hernia but we really only need to see you back if it is causing any symptoms.  The symptoms would be increasing difficulty with urination or recurrent urinary tract infections.          Our office phone numbers are  [phone] and

## 2023-10-12 ENCOUNTER — TELEPHONE (OUTPATIENT)
Dept: INTERNAL MEDICINE | Facility: CLINIC | Age: 78
End: 2023-10-12
Payer: MEDICARE

## 2023-10-12 DIAGNOSIS — M85.80 OSTEOPENIA, UNSPECIFIED LOCATION: Primary | ICD-10-CM

## 2023-10-12 NOTE — TELEPHONE ENCOUNTER
Received Dexa results from 9/14/2023. Bone density scan revealed osteopenia. To keep bones strong she should be exercising, getting at least 1000mg of Calcium per day and 800IU of vitamin D per day through diet (or supplements). Will repeat bone scan in 3 years.

## 2023-10-13 RX ORDER — LISINOPRIL AND HYDROCHLOROTHIAZIDE 12.5; 2 MG/1; MG/1
2 TABLET ORAL
Qty: 60 TABLET | Refills: 10 | Status: SHIPPED | OUTPATIENT
Start: 2023-10-13 | End: 2024-03-01 | Stop reason: SDUPTHER

## 2023-10-16 NOTE — TELEPHONE ENCOUNTER
----- Message from Adela Baird sent at 10/16/2023  9:39 AM CDT -----  Contact: Guillermina Sarmiento is calling to speak to the nurse regarding some medications she would like to report, she would like a call back at [phone]    Thanks  LJ     ----- Message from Gabriel Boo [facility]

## 2023-10-19 ENCOUNTER — PATIENT OUTREACH (OUTPATIENT)
Dept: ADMINISTRATIVE | Facility: HOSPITAL | Age: 78
End: 2023-10-19
Payer: MEDICARE

## 2023-11-11 ENCOUNTER — HOSPITAL ENCOUNTER (EMERGENCY)
Facility: HOSPITAL | Age: 78
Discharge: HOME | End: 2023-11-11
Attending: EMERGENCY MEDICINE
Payer: MEDICARE

## 2023-11-11 VITALS
HEIGHT: 58 IN | BODY MASS INDEX: 30.45 KG/M2 | OXYGEN SATURATION: 98 % | TEMPERATURE: 98 F | HEART RATE: 54 BPM | RESPIRATION RATE: 20 BRPM | WEIGHT: 145.06 LBS | DIASTOLIC BLOOD PRESSURE: 93 MMHG | SYSTOLIC BLOOD PRESSURE: 134 MMHG

## 2023-11-11 DIAGNOSIS — S00.03XA CONTUSION OF SCALP, INITIAL ENCOUNTER: Primary | ICD-10-CM
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
## 2023-11-11 PROCEDURE — 99284 EMERGENCY DEPT VISIT MOD MDM: CPT | Mod: 25,ER

## 2023-11-11 NOTE — ED PROVIDER NOTES
Encounter Date: 2023       History     Chief Complaint   Patient presents with    Fall     Tripped and hit right side of head and right arm. Takes Plavix.      The history is provided by the patient.   Fall  The accident occurred yesterday. The fall occurred while walking. She fell from a height of 1 to 2 ft. She landed on A hard floor. There was no blood loss. The point of impact was the head. The pain is present in the head. The pain is at a severity of 5/10. She was Ambulatory at the scene. There was No entrapment after the fall. There was No drug use involved in the accident. There was No alcohol use involved in the accident. Associated symptoms include neck pain and headaches. Pertinent negatives include no back pain, no paresthesias, no paralysis, no visual change, no fever, no numbness, no abdominal pain, no bowel incontinence, no nausea, no vomiting, no hematuria, no hearing loss, no loss of consciousness and no tingling.     Review of patient's allergies indicates:   Allergen Reactions    Augmentin [amoxicillin-pot clavulanate]      Fixed drug reaction      Amoxicillin Itching, Rash and Other (See Comments)     Yeast infection     Past Medical History:   Diagnosis Date    Atrial flutter     Brain tumor (benign)     Cancer     Coronary artery disease     Diabetes mellitus     High cholesterol     Hypertension      Past Surgical History:   Procedure Laterality Date    ABDOMINAL SURGERY      APPENDECTOMY      BRAIN SURGERY       SECTION      HYSTERECTOMY      TONSILLECTOMY       No family history on file.  Social History     Tobacco Use    Smoking status: Never   Substance Use Topics    Alcohol use: No    Drug use: No     Review of Systems   Constitutional:  Negative for fever.   HENT:  Negative for sore throat.    Respiratory:  Negative for shortness of breath.    Cardiovascular:  Negative for chest pain.   Gastrointestinal:  Negative for abdominal pain, bowel incontinence, nausea and vomiting.    Genitourinary:  Negative for dysuria and hematuria.   Musculoskeletal:  Positive for neck pain. Negative for back pain.   Skin:  Negative for rash.   Neurological:  Positive for headaches. Negative for tingling, loss of consciousness, weakness, numbness and paresthesias.   Hematological:  Does not bruise/bleed easily.       Physical Exam     Initial Vitals [11/11/23 0858]   BP Pulse Resp Temp SpO2   (!) 134/93 (!) 54 20 97.8 °F (36.6 °C) 98 %      MAP       --         Physical Exam    Nursing note and vitals reviewed.  Constitutional: She appears well-developed and well-nourished. No distress.   HENT:   Head: Normocephalic. Head is with abrasion and with contusion. Head is without raccoon's eyes and without Carr's sign.   Mouth/Throat: Oropharynx is clear and moist.   Eyes: Conjunctivae and EOM are normal. Pupils are equal, round, and reactive to light.   Neck: Neck supple.   Normal range of motion.  Cardiovascular:  Normal rate, regular rhythm and normal heart sounds.           Pulmonary/Chest: Breath sounds normal. No respiratory distress.   Abdominal: Abdomen is soft. Bowel sounds are normal. She exhibits no distension. There is no abdominal tenderness.   Musculoskeletal:         General: Normal range of motion.      Cervical back: Normal range of motion and neck supple.     Neurological: She is alert and oriented to person, place, and time. She has normal strength.   Skin: Skin is warm and dry.   Psychiatric: She has a normal mood and affect. Thought content normal.         ED Course   Procedures  Labs Reviewed - No data to display       Imaging Results              CT Cervical Spine Without Contrast (Final result)  Result time 11/11/23 10:02:56      Final result by Yousuf Garrtet MD (11/11/23 10:02:56)                   Impression:      No acute cervical spine fracture.  See above.    All CT scans at this facility use dose modulation, iterative reconstruction and/or weight based dosing when  appropriate to reduce radiation dose to as low as reasonably achievable.      Electronically signed by: Yousuf Garrett MD  Date:    11/11/2023  Time:    10:02               Narrative:    EXAMINATION:  CT CERVICAL SPINE WITHOUT CONTRAST    CLINICAL HISTORY:  Neck injury with neck pain after a fall.Neck trauma (Age >= 65y);    TECHNIQUE:  Axial CT of the cervical spine with coronal and sagittal reformates.    All CT scans at this facility are performed  using dose modulation techniques as appropriate to performed exam including the following:  automated exposure control; adjustment of mA and/or kV according to the patients size (this includes techniques or standardized protocols for targeted exams where dose is matched to indication/reason for exam: i.e. extremities or head);  iterative reconstruction technique.    COMPARISON:  None    FINDINGS:  Prevertebral soft tissues are normal.  Straightening of the normal cervical lordosis is noted.    No acute fracture is seen.    Dystrophic calcification in the posterior cervical ligaments is noted.    Prominent C1-2 arthrosis is noted.  Incidental calcification of the common carotid artery bifurcation is seen.    C2-3: Mild disc bulge.  Mild left greater than right facet arthrosis.    C3-4: Mild disc bulge.  Moderate left foraminal stenosis.    C4-5: Mild disc bulge and osteophyte.  Mild central canal stenosis.  Mild left and moderate right foraminal stenosis.    C5-6: Degenerative disc disease with disc bulge and osteophyte.  Mild central stenosis.  Moderate to severe right and mild-to-moderate left foraminal stenosis.    C6-7: Moderate foraminal stenosis.    C7-T1: Unremarkable except for facet arthrosis.                                       CT Head Without Contrast (Final result)  Result time 11/11/23 10:00:45      Final result by Juan Parikh MD (11/11/23 10:00:45)                   Impression:      Negative for acute intracranial abnormality.    Small superior  right parietal scalp contusion without underlying fracture    All CT scans at this facility are performed  using dose modulation techniques as appropriate to performed exam including the following:  automated exposure control; adjustment of mA and/or kV according to the patients size (this includes techniques or standardized protocols for targeted exams where dose is matched to indication/reason for exam: i.e. extremities or head);  iterative reconstruction technique.      Electronically signed by: Juan Parikh MD  Date:    11/11/2023  Time:    10:00               Narrative:    EXAMINATION:  CT HEAD WITHOUT CONTRAST    CLINICAL HISTORY:  Head trauma, minor (Age >= 65y); Unspecified fall, initial encounter    TECHNIQUE:  Axial CT images obtained throughout the head without intravenous contrast.    COMPARISON:  07/10/2023    FINDINGS:  Negative for acute hemorrhage, mass effect, extraaxial collection, hydrocephalus.    There is good gray white matter differentiation.  Calcified plaque skull-base vasculature.    The paranasal sinuses and mastoids are clear.    No acute calvarial abnormalities.  Postsurgical changes from craniotomy on the right..                                  10:09 AM - Counseling: Spoke with the patient and discussed todays findings, in addition to providing specific details for the plan of care and counseling regarding the diagnosis and prognosis. Questions are answered at this time. Trauma precautions were discussed with patient and/or family/caretaker; I do not specifically detect any abdominal, thoracic, CNS, orthopedic, or other emergent or life threatening condition and that patient is safe to be discharged.  It was also discussed that despite an unrevealing examination and negative radiographic examination for serious or life threatening injury, these conditions may still exist.  As such, patient should return to ED immediately should they experience, severe or worsening pain, shortness of  breath, abdominal pain, headache, vomiting, or any other concern.  It was also discussed that not infrequently, injuries may not be diagnosed during the initial ED visit (such as fractures) and that if the patient discovers a new area of concern, a new area of injury that was not evaluated in the ED, they should return for evaluation as they may have an injury that requires treatment.         Medications - No data to display  Medical Decision Making  Problems Addressed:  Contusion of scalp, initial encounter: acute illness or injury  Fall: acute illness or injury    Amount and/or Complexity of Data Reviewed  Radiology: ordered.                               Clinical Impression:   Final diagnoses:  [W19.XXXA] Fall  [S00.03XA] Contusion of scalp, initial encounter (Primary)        ED Disposition Condition    Discharge Stable          ED Prescriptions    None       Follow-up Information       Follow up With Specialties Details Why Contact Info    Thomas Pimentel MD Internal Medicine, Family Medicine Call in 2 days  [address] [place] [id number]  [phone]      [facility] - Emergency Dept Emergency Medicine  If symptoms worsen [address] [place] [id number] [phone]             Drew Fonseca MD  11/11/23 9053

## 2023-11-16 NOTE — ASSESSMENT & PLAN NOTE
"Lab Results   Component Value Date    CHOL 178 02/27/2023    CHOL 173 02/22/2022    CHOL 167 08/23/2021     Lab Results   Component Value Date    HDL 68 02/27/2023    HDL 73 02/22/2022    HDL 73 08/23/2021     Lab Results   Component Value Date    LDLCALC 94 02/27/2023    LDLCALC 87 02/22/2022    LDLCALC 81 08/23/2021     Lab Results   Component Value Date    TRIG 88 02/27/2023    TRIG 71 02/22/2022    TRIG 67 08/23/2021       No results found for: "CHOLHDL"  Stable.  Continue simvastatin.  "

## 2023-11-16 NOTE — PROGRESS NOTES
Subjective:       Patient ID: Guillermina Person is a 78 y.o. female.    Chief Complaint: Hypertension    Mrs. Person returns to clinic with her  for DM/HTN follow up . Due for routine labs. She continues to have problems with abdominal hernia. She was seen by general surgery and hesitant to proceed with surgery due to cardiac risk. She has an appointment with cardiology to further discuss.      She was also seen in ER on 2023 for fall. She has a contusion to head. CT head and neck completed were unremarkable. She denies any neurologic changes. Head is  to touch with swelling.       Patient Active Problem List   Diagnosis    Essential hypertension    History of uterine cancer    Left bundle branch block    Paroxysmal atrial fibrillation    Primary osteoarthritis of both knees    Type 2 diabetes mellitus with other specified complication    Class 1 obesity due to excess calories with serious comorbidity and body mass index (BMI) of 31.0 to 31.9 in adult    Mixed hyperlipidemia    Osteopenia       History reviewed. No pertinent family history.  Past Surgical History:   Procedure Laterality Date    ABDOMINAL SURGERY      APPENDECTOMY      BRAIN SURGERY       SECTION      HYSTERECTOMY      TONSILLECTOMY           Current Outpatient Medications:     amiodarone (PACERONE) 100 MG Tab, Take 1 tablet (100 mg total) by mouth once daily., Disp: 90 tablet, Rfl: 1    apixaban (ELIQUIS) 5 mg Tab, Take 1 tablet (5 mg total) by mouth 2 (two) times daily., Disp: 180 tablet, Rfl: 0    atenoloL (TENORMIN) 25 MG tablet, Take 1 tablet by mouth once daily., Disp: , Rfl:     blood sugar diagnostic Strp, Test one time daily, Disp: , Rfl:     blood-glucose meter kit, USE AS DIRECTED, Disp: , Rfl:     fish-flx-prm-blkbor-om 3,6,9 5 400-400-200 mg Cap, Take 1 tablet by mouth once daily., Disp: , Rfl:     furosemide (LASIX) 40 MG tablet, Take 40 mg by mouth daily as needed., Disp: , Rfl:     lancets 33 gauge Misc,  "Test one time daily, Disp: , Rfl:     lisinopriL-hydrochlorothiazide (PRINZIDE,ZESTORETIC) 20-12.5 mg per tablet, TAKE 2 TABLETS ONE TIME DAILY, Disp: 60 tablet, Rfl: 10    loratadine (CLARITIN REDITABS) 10 mg dissolvable tablet, Take 1 tablet by mouth daily as needed., Disp: , Rfl:     pulse oximeter (PULSE OXIMETER) device, Use twice daily at 8 AM and 3 PM and record the value in [facility] as directed., Disp: 1 each, Rfl: 0    simvastatin (ZOCOR) 20 MG tablet, Take 1 tablet (20 mg total) by mouth every evening., Disp: 90 tablet, Rfl: 1    albuterol (PROVENTIL/VENTOLIN HFA) 90 mcg/actuation inhaler, Inhale 1-2 puffs into the lungs every 6 (six) hours as needed for Wheezing or Shortness of Breath. Rescue, Disp: 8 g, Rfl: 0    metFORMIN (GLUCOPHAGE) 500 MG tablet, Take 1 tablet (500 mg total) by mouth once daily., Disp: 90 tablet, Rfl: 1    Review of Systems   Constitutional:  Negative for chills, fatigue and fever.   Respiratory:  Negative for chest tightness, shortness of breath and wheezing.    Cardiovascular:  Negative for chest pain, palpitations and leg swelling.   Gastrointestinal:  Negative for abdominal pain, constipation, nausea and vomiting.   Genitourinary:  Negative for difficulty urinating.   Musculoskeletal:  Negative for myalgias.   Skin:  Positive for color change. Negative for rash and wound.   Neurological:  Negative for dizziness, tremors, syncope, facial asymmetry, speech difficulty, weakness, light-headedness, numbness and headaches.   Psychiatric/Behavioral:  Negative for confusion and dysphoric mood. The patient is not nervous/anxious.        Objective:   /80 (BP Location: Left arm, Patient Position: Sitting, BP Method: Large (Manual))   Pulse 60   Temp 98.1 °F (36.7 °C) (Temporal)   Resp 16   Ht 4' 10" (1.473 m)   Wt 67.4 kg (148 lb 9.4 oz)   SpO2 97%   BMI 31.06 kg/m²      Physical Exam  Constitutional:       General: She is not in acute distress.     Appearance: Normal " appearance. She is not ill-appearing.   HENT:      Head: Normocephalic. Contusion present. No right periorbital erythema, left periorbital erythema or laceration.   Eyes:      Extraocular Movements: Extraocular movements intact.      Conjunctiva/sclera: Conjunctivae normal.   Cardiovascular:      Rate and Rhythm: Normal rate and regular rhythm.      Heart sounds: Normal heart sounds. No murmur heard.  Pulmonary:      Effort: Pulmonary effort is normal. No respiratory distress.      Breath sounds: Normal breath sounds. No wheezing, rhonchi or rales.   Skin:     General: Skin is warm and dry.      Coloration: Skin is not pale.      Findings: No erythema or rash.   Neurological:      Mental Status: She is alert and oriented to person, place, and time.      Coordination: Coordination normal.      Gait: Gait normal.   Psychiatric:         Mood and Affect: Mood normal.         Behavior: Behavior normal.         Assessment & Plan     1. Type 2 diabetes mellitus with other specified complication, without long-term current use of insulin  Assessment & Plan:  Lab Results   Component Value Date    HGBA1C 5.4 02/27/2023     Repeat A1c today.  Continue metformin and lifestyle modifications.    Orders:  -     COMPREHENSIVE METABOLIC PANEL; Future; Expected date: 11/17/2023  -     HEMOGLOBIN A1C; Future; Expected date: 11/17/2023  -     Microalbumin/Creatinine Ratio, Urine; Future; Expected date: 11/17/2023  -     metFORMIN (GLUCOPHAGE) 500 MG tablet; Take 1 tablet (500 mg total) by mouth once daily.  Dispense: 90 tablet; Refill: 1    2. Essential hypertension  Assessment & Plan:  Stable.  Continue atenolol and lisinopril-hydrochlorothiazide.    Orders:  -     COMPREHENSIVE METABOLIC PANEL; Future; Expected date: 11/17/2023    3. Paroxysmal atrial fibrillation  Assessment & Plan:  Rate controlled today. Continue amiodarone, Eliquis, and atenolol.       4. Mixed hyperlipidemia  Assessment & Plan:  Lab Results   Component Value Date  "   CHOL 178 02/27/2023    CHOL 173 02/22/2022    CHOL 167 08/23/2021     Lab Results   Component Value Date    HDL 68 02/27/2023    HDL 73 02/22/2022    HDL 73 08/23/2021     Lab Results   Component Value Date    LDLCALC 94 02/27/2023    LDLCALC 87 02/22/2022    LDLCALC 81 08/23/2021     Lab Results   Component Value Date    TRIG 88 02/27/2023    TRIG 71 02/22/2022    TRIG 67 08/23/2021       No results found for: "CHOLHDL"  Stable.  Continue simvastatin.    Orders:  -     COMPREHENSIVE METABOLIC PANEL; Future; Expected date: 11/17/2023  -     LIPID PANEL; Future; Expected date: 11/17/2023    5. Osteopenia, unspecified location  Assessment & Plan:  Continue calcium and vitamin-D supplement.      6. Dyspnea, unspecified type  -     albuterol (PROVENTIL/VENTOLIN HFA) 90 mcg/actuation inhaler; Inhale 1-2 puffs into the lungs every 6 (six) hours as needed for Wheezing or Shortness of Breath. Rescue  Dispense: 8 g; Refill: 0    7. Contusion of scalp, subsequent encounter  Comments:  Reviewed ER note and CT head/neck. No concerning findings on exam. RTC and fall precautions reviewed.    8. Class 1 obesity due to excess calories with serious comorbidity and body mass index (BMI) of 31.0 to 31.9 in adult  Assessment & Plan:  Counseled on importance of diet and exercise. Encouraged patient to have an active lifestyle with regular exercise with healthy eating.               JAY JAY Yanez      Portions of this note may have been created with voice recognition software. Occasional "wrong-word" or "sound-a-like" substitutions may have occurred due to the inherent limitations of voice recognition software. Please, read the note carefully and recognize, using context, where substitutions have occurred.     "

## 2023-11-16 NOTE — ASSESSMENT & PLAN NOTE
Lab Results   Component Value Date    HGBA1C 5.4 02/27/2023     Repeat A1c today.  Continue metformin and lifestyle modifications.

## 2023-11-17 ENCOUNTER — LAB VISIT (OUTPATIENT)
Dept: LAB | Facility: HOSPITAL | Age: 78
End: 2023-11-17
Attending: NURSE PRACTITIONER
Payer: MEDICARE

## 2023-11-17 ENCOUNTER — OFFICE VISIT (OUTPATIENT)
Dept: INTERNAL MEDICINE | Facility: CLINIC | Age: 78
End: 2023-11-17
Payer: MEDICARE

## 2023-11-17 VITALS
SYSTOLIC BLOOD PRESSURE: 128 MMHG | DIASTOLIC BLOOD PRESSURE: 80 MMHG | TEMPERATURE: 98 F | RESPIRATION RATE: 16 BRPM | WEIGHT: 148.56 LBS | BODY MASS INDEX: 31.19 KG/M2 | HEART RATE: 60 BPM | OXYGEN SATURATION: 97 % | HEIGHT: 58 IN

## 2023-11-17 DIAGNOSIS — M85.80 OSTEOPENIA, UNSPECIFIED LOCATION: ICD-10-CM

## 2023-11-17 DIAGNOSIS — E11.69 TYPE 2 DIABETES MELLITUS WITH OTHER SPECIFIED COMPLICATION, WITHOUT LONG-TERM CURRENT USE OF INSULIN: ICD-10-CM

## 2023-11-17 DIAGNOSIS — I48.0 PAROXYSMAL ATRIAL FIBRILLATION: ICD-10-CM

## 2023-11-17 DIAGNOSIS — R06.00 DYSPNEA, UNSPECIFIED TYPE: ICD-10-CM

## 2023-11-17 DIAGNOSIS — E78.2 MIXED HYPERLIPIDEMIA: ICD-10-CM

## 2023-11-17 DIAGNOSIS — S00.03XD CONTUSION OF SCALP, SUBSEQUENT ENCOUNTER: ICD-10-CM

## 2023-11-17 DIAGNOSIS — E11.69 TYPE 2 DIABETES MELLITUS WITH OTHER SPECIFIED COMPLICATION, WITHOUT LONG-TERM CURRENT USE OF INSULIN: Primary | ICD-10-CM

## 2023-11-17 DIAGNOSIS — E66.09 CLASS 1 OBESITY DUE TO EXCESS CALORIES WITH SERIOUS COMORBIDITY AND BODY MASS INDEX (BMI) OF 31.0 TO 31.9 IN ADULT: ICD-10-CM

## 2023-11-17 DIAGNOSIS — I10 ESSENTIAL HYPERTENSION: ICD-10-CM

## 2023-11-17 LAB
ALBUMIN/CREAT UR: 5.1 UG/MG (ref 0–30)
CREAT UR-MCNC: 98 MG/DL (ref 15–325)
MICROALBUMIN UR DL<=1MG/L-MCNC: 5 UG/ML

## 2023-11-17 PROCEDURE — 3079F DIAST BP 80-89 MM HG: CPT | Mod: CPTII,S$GLB,, | Performed by: NURSE PRACTITIONER

## 2023-11-17 PROCEDURE — 3079F PR MOST RECENT DIASTOLIC BLOOD PRESSURE 80-89 MM HG: ICD-10-PCS | Mod: CPTII,S$GLB,, | Performed by: NURSE PRACTITIONER

## 2023-11-17 PROCEDURE — 99214 OFFICE O/P EST MOD 30 MIN: CPT | Mod: S$GLB,,, | Performed by: NURSE PRACTITIONER

## 2023-11-17 PROCEDURE — 82043 UR ALBUMIN QUANTITATIVE: CPT | Performed by: NURSE PRACTITIONER

## 2023-11-17 PROCEDURE — 1100F PTFALLS ASSESS-DOCD GE2>/YR: CPT | Mod: CPTII,S$GLB,, | Performed by: NURSE PRACTITIONER

## 2023-11-17 PROCEDURE — 3074F SYST BP LT 130 MM HG: CPT | Mod: CPTII,S$GLB,, | Performed by: NURSE PRACTITIONER

## 2023-11-17 PROCEDURE — 1160F PR REVIEW ALL MEDS BY PRESCRIBER/CLIN PHARMACIST DOCUMENTED: ICD-10-PCS | Mod: CPTII,S$GLB,, | Performed by: NURSE PRACTITIONER

## 2023-11-17 PROCEDURE — 2023F DILAT RTA XM W/O RTNOPTHY: CPT | Mod: CPTII,S$GLB,, | Performed by: NURSE PRACTITIONER

## 2023-11-17 PROCEDURE — 99999 PR PBB SHADOW E&M-EST. PATIENT-LVL IV: CPT | Mod: PBBFAC,,, | Performed by: NURSE PRACTITIONER

## 2023-11-17 PROCEDURE — 99214 PR OFFICE/OUTPT VISIT, EST, LEVL IV, 30-39 MIN: ICD-10-PCS | Mod: S$GLB,,, | Performed by: NURSE PRACTITIONER

## 2023-11-17 PROCEDURE — 1160F RVW MEDS BY RX/DR IN RCRD: CPT | Mod: CPTII,S$GLB,, | Performed by: NURSE PRACTITIONER

## 2023-11-17 PROCEDURE — 3288F PR FALLS RISK ASSESSMENT DOCUMENTED: ICD-10-PCS | Mod: CPTII,S$GLB,, | Performed by: NURSE PRACTITIONER

## 2023-11-17 PROCEDURE — 2023F PR DILATED RETINAL EXAM W/O EVID OF RETINOPATHY: ICD-10-PCS | Mod: CPTII,S$GLB,, | Performed by: NURSE PRACTITIONER

## 2023-11-17 PROCEDURE — 1100F PR PT FALLS ASSESS DOC 2+ FALLS/FALL W/INJURY/YR: ICD-10-PCS | Mod: CPTII,S$GLB,, | Performed by: NURSE PRACTITIONER

## 2023-11-17 PROCEDURE — 3074F PR MOST RECENT SYSTOLIC BLOOD PRESSURE < 130 MM HG: ICD-10-PCS | Mod: CPTII,S$GLB,, | Performed by: NURSE PRACTITIONER

## 2023-11-17 PROCEDURE — 3288F FALL RISK ASSESSMENT DOCD: CPT | Mod: CPTII,S$GLB,, | Performed by: NURSE PRACTITIONER

## 2023-11-17 PROCEDURE — 1159F PR MEDICATION LIST DOCUMENTED IN MEDICAL RECORD: ICD-10-PCS | Mod: CPTII,S$GLB,, | Performed by: NURSE PRACTITIONER

## 2023-11-17 PROCEDURE — 99999 PR PBB SHADOW E&M-EST. PATIENT-LVL IV: ICD-10-PCS | Mod: PBBFAC,,, | Performed by: NURSE PRACTITIONER

## 2023-11-17 PROCEDURE — 1159F MED LIST DOCD IN RCRD: CPT | Mod: CPTII,S$GLB,, | Performed by: NURSE PRACTITIONER

## 2023-11-17 RX ORDER — METFORMIN HYDROCHLORIDE 500 MG/1
500 TABLET ORAL DAILY
Qty: 90 TABLET | Refills: 1 | Status: SHIPPED | OUTPATIENT
Start: 2023-11-17 | End: 2024-01-29 | Stop reason: SDUPTHER

## 2023-11-17 RX ORDER — FUROSEMIDE 40 MG/1
40 TABLET ORAL DAILY PRN
Status: CANCELLED | OUTPATIENT
Start: 2023-11-17

## 2023-11-17 RX ORDER — ALBUTEROL SULFATE 90 UG/1
1-2 AEROSOL, METERED RESPIRATORY (INHALATION) EVERY 6 HOURS PRN
Qty: 8 G | Refills: 0 | Status: SHIPPED | OUTPATIENT
Start: 2023-11-17 | End: 2023-12-28 | Stop reason: SDUPTHER

## 2023-11-17 RX ORDER — AMIODARONE HYDROCHLORIDE 100 MG/1
100 TABLET ORAL DAILY
Qty: 90 TABLET | Refills: 1 | Status: CANCELLED | OUTPATIENT
Start: 2023-11-17 | End: 2024-05-15

## 2023-11-17 RX ORDER — ATENOLOL 25 MG/1
25 TABLET ORAL DAILY
Status: CANCELLED | OUTPATIENT
Start: 2023-11-17

## 2023-11-17 RX ORDER — SIMVASTATIN 20 MG/1
20 TABLET, FILM COATED ORAL NIGHTLY
Qty: 90 TABLET | Refills: 1 | Status: CANCELLED | OUTPATIENT
Start: 2023-11-17 | End: 2024-05-15

## 2023-11-17 RX ORDER — LISINOPRIL AND HYDROCHLOROTHIAZIDE 12.5; 2 MG/1; MG/1
2 TABLET ORAL
Qty: 60 TABLET | Refills: 10 | Status: CANCELLED | OUTPATIENT
Start: 2023-11-17

## 2023-12-05 DIAGNOSIS — Z76.89 ENCOUNTER TO ESTABLISH CARE: Primary | ICD-10-CM

## 2023-12-13 ENCOUNTER — OFFICE VISIT (OUTPATIENT)
Dept: CARDIOLOGY | Facility: CLINIC | Age: 78
End: 2023-12-13
Payer: MEDICARE

## 2023-12-13 VITALS
WEIGHT: 150.13 LBS | BODY MASS INDEX: 31.51 KG/M2 | HEIGHT: 58 IN | HEART RATE: 55 BPM | DIASTOLIC BLOOD PRESSURE: 75 MMHG | SYSTOLIC BLOOD PRESSURE: 137 MMHG | OXYGEN SATURATION: 98 %

## 2023-12-13 DIAGNOSIS — Z85.42 HISTORY OF UTERINE CANCER: ICD-10-CM

## 2023-12-13 DIAGNOSIS — E78.2 MIXED HYPERLIPIDEMIA: ICD-10-CM

## 2023-12-13 DIAGNOSIS — I10 ESSENTIAL HYPERTENSION: ICD-10-CM

## 2023-12-13 DIAGNOSIS — I48.0 PAROXYSMAL ATRIAL FIBRILLATION: ICD-10-CM

## 2023-12-13 DIAGNOSIS — Z98.890 HISTORY OF HERNIA REPAIR: ICD-10-CM

## 2023-12-13 DIAGNOSIS — Z76.89 ENCOUNTER TO ESTABLISH CARE: Primary | ICD-10-CM

## 2023-12-13 DIAGNOSIS — I44.7 LEFT BUNDLE BRANCH BLOCK: ICD-10-CM

## 2023-12-13 DIAGNOSIS — Z87.19 HISTORY OF HERNIA REPAIR: ICD-10-CM

## 2023-12-13 PROCEDURE — 1126F AMNT PAIN NOTED NONE PRSNT: CPT | Mod: CPTII,S$GLB,, | Performed by: INTERNAL MEDICINE

## 2023-12-13 PROCEDURE — 3075F SYST BP GE 130 - 139MM HG: CPT | Mod: CPTII,S$GLB,, | Performed by: INTERNAL MEDICINE

## 2023-12-13 PROCEDURE — 1126F PR PAIN SEVERITY QUANTIFIED, NO PAIN PRESENT: ICD-10-PCS | Mod: CPTII,S$GLB,, | Performed by: INTERNAL MEDICINE

## 2023-12-13 PROCEDURE — 93010 ELECTROCARDIOGRAM REPORT: CPT | Mod: S$GLB,,, | Performed by: STUDENT IN AN ORGANIZED HEALTH CARE EDUCATION/TRAINING PROGRAM

## 2023-12-13 PROCEDURE — 3078F PR MOST RECENT DIASTOLIC BLOOD PRESSURE < 80 MM HG: ICD-10-PCS | Mod: CPTII,S$GLB,, | Performed by: INTERNAL MEDICINE

## 2023-12-13 PROCEDURE — 93010 EKG 12-LEAD: ICD-10-PCS | Mod: S$GLB,,, | Performed by: STUDENT IN AN ORGANIZED HEALTH CARE EDUCATION/TRAINING PROGRAM

## 2023-12-13 PROCEDURE — 1101F PT FALLS ASSESS-DOCD LE1/YR: CPT | Mod: CPTII,S$GLB,, | Performed by: INTERNAL MEDICINE

## 2023-12-13 PROCEDURE — 3075F PR MOST RECENT SYSTOLIC BLOOD PRESS GE 130-139MM HG: ICD-10-PCS | Mod: CPTII,S$GLB,, | Performed by: INTERNAL MEDICINE

## 2023-12-13 PROCEDURE — 99204 PR OFFICE/OUTPT VISIT, NEW, LEVL IV, 45-59 MIN: ICD-10-PCS | Mod: S$GLB,,, | Performed by: INTERNAL MEDICINE

## 2023-12-13 PROCEDURE — 1101F PR PT FALLS ASSESS DOC 0-1 FALLS W/OUT INJ PAST YR: ICD-10-PCS | Mod: CPTII,S$GLB,, | Performed by: INTERNAL MEDICINE

## 2023-12-13 PROCEDURE — 93005 ELECTROCARDIOGRAM TRACING: CPT | Mod: PO

## 2023-12-13 PROCEDURE — 1159F PR MEDICATION LIST DOCUMENTED IN MEDICAL RECORD: ICD-10-PCS | Mod: CPTII,S$GLB,, | Performed by: INTERNAL MEDICINE

## 2023-12-13 PROCEDURE — 3078F DIAST BP <80 MM HG: CPT | Mod: CPTII,S$GLB,, | Performed by: INTERNAL MEDICINE

## 2023-12-13 PROCEDURE — 3288F PR FALLS RISK ASSESSMENT DOCUMENTED: ICD-10-PCS | Mod: CPTII,S$GLB,, | Performed by: INTERNAL MEDICINE

## 2023-12-13 PROCEDURE — 99999 PR PBB SHADOW E&M-EST. PATIENT-LVL IV: ICD-10-PCS | Mod: PBBFAC,,, | Performed by: INTERNAL MEDICINE

## 2023-12-13 PROCEDURE — 3288F FALL RISK ASSESSMENT DOCD: CPT | Mod: CPTII,S$GLB,, | Performed by: INTERNAL MEDICINE

## 2023-12-13 PROCEDURE — 99999 PR PBB SHADOW E&M-EST. PATIENT-LVL IV: CPT | Mod: PBBFAC,,, | Performed by: INTERNAL MEDICINE

## 2023-12-13 PROCEDURE — 99204 OFFICE O/P NEW MOD 45 MIN: CPT | Mod: S$GLB,,, | Performed by: INTERNAL MEDICINE

## 2023-12-13 PROCEDURE — 1159F MED LIST DOCD IN RCRD: CPT | Mod: CPTII,S$GLB,, | Performed by: INTERNAL MEDICINE

## 2023-12-13 NOTE — PROGRESS NOTES
Subjective:   Patient ID:  Guillermina Person is a 78 y.o. female who presents for evaluation of No chief complaint on file.      HPI  78-year-old female.  Comes in to establish care with new doctor today.  I reviewed her records from Dr. Taylor at [facility].  Last saw him in .  She has history of old left bundle-branch block that has been worked up in the past and found not to be ischemic.    She deals also with hernia.  However she has not very symptomatic.  She had seen general surgery recently.  And she was to continue conservative treatment.    Her functional status is very good.  Denies any exertional angina, dyspnea on exertion.  Lower extremity swelling.  No recent palpitations.  Syncope or presyncope.  She had 2 mechanical falls within a year however the last 1 was almost more than 7 months ago.  I reviewed Dr. Taylor's note mentioning possible referral for Watchman device however patient not interested and she does not have truly frequent falls per her history she was trying to grab something off the floor when something slid from underneath her.  Parox atrial flutter (atrial flutter ablation by Dr. Uriarte summer 2020.)  HTN  HLD   LBBB   Cath [facility] for new LBBB- nonobstructive reportedly  JAYLIN in  for endometrial cancer with no chemo or RT.  Sp R cranial meningioma s/p resection   Mild LV dysfx 45-50% on echo in /Normalized in 2022       Past Medical History:   Diagnosis Date    Atrial flutter     Brain tumor (benign)     Cancer     Coronary artery disease     Diabetes mellitus     High cholesterol     Hypertension        Past Surgical History:   Procedure Laterality Date    ABDOMINAL SURGERY      APPENDECTOMY      BRAIN SURGERY       SECTION      HYSTERECTOMY      TONSILLECTOMY         Social History     Tobacco Use    Smoking status: Never   Substance Use Topics    Alcohol use: No    Drug use: No       History reviewed. No pertinent family history.    Review of Systems    Cardiovascular:  Negative for chest pain, dyspnea on exertion, palpitations and syncope.   Genitourinary: Negative.    Neurological: Negative.        Current Outpatient Medications on File Prior to Visit   Medication Sig    albuterol (PROVENTIL/VENTOLIN HFA) 90 mcg/actuation inhaler Inhale 1-2 puffs into the lungs every 6 (six) hours as needed for Wheezing or Shortness of Breath. Rescue    amiodarone (PACERONE) 100 MG Tab Take 1 tablet (100 mg total) by mouth once daily.    apixaban (ELIQUIS) 5 mg Tab Take 1 tablet (5 mg total) by mouth 2 (two) times daily.    atenoloL (TENORMIN) 25 MG tablet Take 1 tablet by mouth once daily.    blood sugar diagnostic Strp Test one time daily    blood-glucose meter kit USE AS DIRECTED    fish-flx-prm-blkbor-om 3,6,9 5 400-400-200 mg Cap Take 1 tablet by mouth once daily.    furosemide (LASIX) 40 MG tablet Take 40 mg by mouth daily as needed.    lancets 33 gauge Misc Test one time daily    lisinopriL-hydrochlorothiazide (PRINZIDE,ZESTORETIC) 20-12.5 mg per tablet TAKE 2 TABLETS ONE TIME DAILY    loratadine (CLARITIN REDITABS) 10 mg dissolvable tablet Take 1 tablet by mouth daily as needed.    metFORMIN (GLUCOPHAGE) 500 MG tablet Take 1 tablet (500 mg total) by mouth once daily.    pulse oximeter (PULSE OXIMETER) device Use twice daily at 8 AM and 3 PM and record the value in [employer] as directed.    simvastatin (ZOCOR) 20 MG tablet Take 1 tablet (20 mg total) by mouth every evening.     No current facility-administered medications on file prior to visit.       Objective:   Objective:  Wt Readings from Last 3 Encounters:   12/13/23 68.1 kg (150 lb 2.1 oz)   11/17/23 67.4 kg (148 lb 9.4 oz)   11/11/23 65.8 kg (145 lb 1 oz)     Temp Readings from Last 3 Encounters:   11/17/23 98.1 °F (36.7 °C) (Temporal)   11/11/23 97.8 °F (36.6 °C) (Oral)   09/24/23 98.2 °F (36.8 °C) (Oral)     BP Readings from Last 3 Encounters:   12/13/23 137/75   11/17/23 128/80   11/11/23 (!) 134/93     Pulse  Readings from Last 3 Encounters:   12/13/23 (!) 55   11/17/23 60   11/11/23 (!) 54       Physical Exam  Vitals reviewed.   Constitutional:       Appearance: She is well-developed.   Neck:      Vascular: No carotid bruit.   Cardiovascular:      Rate and Rhythm: Normal rate and regular rhythm.      Pulses: Intact distal pulses.      Heart sounds: Normal heart sounds. No murmur heard.  Pulmonary:      Breath sounds: Normal breath sounds.   Neurological:      Mental Status: She is oriented to person, place, and time.         Lab Results   Component Value Date    CHOL 160 11/17/2023    CHOL 178 02/27/2023    CHOL 173 02/22/2022     Lab Results   Component Value Date    HDL 63 11/17/2023    HDL 68 02/27/2023    HDL 73 02/22/2022     Lab Results   Component Value Date    LDLCALC 83.0 11/17/2023    LDLCALC 94 02/27/2023    LDLCALC 87 02/22/2022     Lab Results   Component Value Date    TRIG 70 11/17/2023    TRIG 88 02/27/2023    TRIG 71 02/22/2022     Lab Results   Component Value Date    CHOLHDL 39.4 11/17/2023       Chemistry        Component Value Date/Time     11/17/2023 0959    K 3.5 11/17/2023 0959     11/17/2023 0959    CO2 25 11/17/2023 0959    BUN 14 11/17/2023 0959    CREATININE 0.9 11/17/2023 0959    GLU 93 11/17/2023 0959        Component Value Date/Time    CALCIUM 9.0 11/17/2023 0959    ALKPHOS 70 11/17/2023 0959    AST 12 11/17/2023 0959    ALT 8 (L) 11/17/2023 0959    BILITOT 0.9 11/17/2023 0959    ESTGFRAFRICA 88 07/17/2020 1015    ESTGFRAFRICA >60.0 07/11/2015 0915    EGFRNONAA >60.0 07/11/2015 0915          Lab Results   Component Value Date    TSH 2.470 02/27/2023     Lab Results   Component Value Date    INR 1.6 07/17/2020    INR 1.0 07/11/2015     Lab Results   Component Value Date    WBC 6.3 02/27/2023    HGB 13.3 02/27/2023    HCT 40.9 02/27/2023    MCV 86 08/01/2022     02/27/2023     BNP  @LABRCNTIP(BNP,BNPTRIAGEBLO)@  CrCl cannot be calculated (Patient's most recent lab result is  older than the maximum 7 days allowed.).     Imaging:  ======    No results found for this or any previous visit.    No results found for this or any previous visit.    Results for orders placed during the hospital encounter of 11/29/16    X-Ray Chest PA And Lateral    Narrative  Findings: 2 views. Comparison June 7, 2010. Heart size stable. No new consolidation seen within the lungs. Mild prominence of the fissures, particularly the minor fissure new from prior radiograph. Multilevel degenerative change in the thoracic spine.  IMPRESSION:  No definite acute cardiopulmonary disease.      Electronically signed by: JASON HENDRIX MD  Date:     11/29/16  Time:    11:32    No results found for this or any previous visit.    No valid procedures specified.    No results found for this or any previous visit.      No results found for this or any previous visit.      No results found for this or any previous visit.      Diagnostic Results:  ECG: Reviewed    The 10-year ASCVD risk score (Santosh UREÑA, et al., 2019) is: 57.7%    Values used to calculate the score:      Age: 78 years      Sex: Female      Is Non- : No      Diabetic: Yes      Tobacco smoker: No      Systolic Blood Pressure: 137 mmHg      Is BP treated: Yes      HDL Cholesterol: 63 mg/dL      Total Cholesterol: 160 mg/dL        Assessment and Plan:   Encounter to establish care    Paroxysmal atrial fibrillation  -     Ambulatory referral/consult to Cardiology    Left bundle branch block    Essential hypertension    Mixed hyperlipidemia    History of uterine cancer    History of hernia repair      Asymptomatic.  Continue to check blood pressure at home.  States normal 120s.    No changes to her medications today.    Good functional status.  Recent normal EF.  Okay to undergo abdominal surgery if indicated stable from cardiac standpoint.  If so would need to hold her Eliquis for 2 days prior to her surgery.  Reviewed all tests and above  medical conditions with patient in detail and formulated treatment plan.  Risk factor modification discussed.   Cardiac low salt diet discussed.  Maintaining healthy weight and weight loss goals were discussed in clinic.    Follow up in  6 months

## 2023-12-26 ENCOUNTER — HOSPITAL ENCOUNTER (OUTPATIENT)
Dept: RADIOLOGY | Facility: HOSPITAL | Age: 78
Discharge: HOME | End: 2023-12-26
Attending: STUDENT IN AN ORGANIZED HEALTH CARE EDUCATION/TRAINING PROGRAM
Payer: MEDICARE

## 2023-12-26 VITALS — BODY MASS INDEX: 31.51 KG/M2 | WEIGHT: 150.13 LBS | HEIGHT: 58 IN

## 2023-12-26 DIAGNOSIS — Z12.31 ENCOUNTER FOR SCREENING MAMMOGRAM FOR BREAST CANCER: ICD-10-CM

## 2023-12-26 PROCEDURE — 77063 MAMMO DIGITAL SCREENING BILAT WITH TOMO: ICD-10-PCS | Mod: 26,,, | Performed by: RADIOLOGY

## 2023-12-26 PROCEDURE — 77067 MAMMO DIGITAL SCREENING BILAT WITH TOMO: ICD-10-PCS | Mod: 26,,, | Performed by: RADIOLOGY

## 2023-12-26 PROCEDURE — 77063 BREAST TOMOSYNTHESIS BI: CPT | Mod: 26,,, | Performed by: RADIOLOGY

## 2023-12-26 PROCEDURE — 77067 SCR MAMMO BI INCL CAD: CPT | Mod: TC,PO

## 2023-12-26 PROCEDURE — 77067 SCR MAMMO BI INCL CAD: CPT | Mod: 26,,, | Performed by: RADIOLOGY

## 2023-12-28 DIAGNOSIS — R06.00 DYSPNEA, UNSPECIFIED TYPE: ICD-10-CM

## 2023-12-28 RX ORDER — ALBUTEROL SULFATE 90 UG/1
1-2 AEROSOL, METERED RESPIRATORY (INHALATION) EVERY 6 HOURS PRN
Qty: 8 G | Refills: 2 | Status: SHIPPED | OUTPATIENT
Start: 2023-12-28 | End: 2024-03-01 | Stop reason: SDUPTHER

## 2024-01-23 RX ORDER — SIMVASTATIN 20 MG/1
20 TABLET, FILM COATED ORAL NIGHTLY
Qty: 90 TABLET | Refills: 1 | Status: SHIPPED | OUTPATIENT
Start: 2024-01-23 | End: 2024-01-29 | Stop reason: SDUPTHER

## 2024-01-29 DIAGNOSIS — E11.69 TYPE 2 DIABETES MELLITUS WITH OTHER SPECIFIED COMPLICATION, WITHOUT LONG-TERM CURRENT USE OF INSULIN: ICD-10-CM

## 2024-01-29 RX ORDER — METFORMIN HYDROCHLORIDE 500 MG/1
500 TABLET ORAL DAILY
Qty: 90 TABLET | Refills: 1 | Status: SHIPPED | OUTPATIENT
Start: 2024-01-29 | End: 2024-03-01 | Stop reason: SDUPTHER

## 2024-01-29 RX ORDER — AMIODARONE HYDROCHLORIDE 100 MG/1
100 TABLET ORAL DAILY
Qty: 90 TABLET | Refills: 1 | Status: SHIPPED | OUTPATIENT
Start: 2024-01-29 | End: 2024-02-14 | Stop reason: SDUPTHER

## 2024-01-29 RX ORDER — SIMVASTATIN 20 MG/1
20 TABLET, FILM COATED ORAL NIGHTLY
Qty: 90 TABLET | Refills: 1 | Status: SHIPPED | OUTPATIENT
Start: 2024-01-29 | End: 2024-03-01 | Stop reason: SDUPTHER

## 2024-02-14 RX ORDER — AMIODARONE HYDROCHLORIDE 100 MG/1
100 TABLET ORAL DAILY
Qty: 90 TABLET | Refills: 1 | Status: SHIPPED | OUTPATIENT
Start: 2024-02-14 | End: 2024-03-01 | Stop reason: SDUPTHER

## 2024-03-01 DIAGNOSIS — R06.00 DYSPNEA, UNSPECIFIED TYPE: ICD-10-CM

## 2024-03-01 DIAGNOSIS — E11.69 TYPE 2 DIABETES MELLITUS WITH OTHER SPECIFIED COMPLICATION, WITHOUT LONG-TERM CURRENT USE OF INSULIN: ICD-10-CM

## 2024-03-01 RX ORDER — ALBUTEROL SULFATE 90 UG/1
1-2 AEROSOL, METERED RESPIRATORY (INHALATION) EVERY 6 HOURS PRN
Qty: 8 G | Refills: 2 | Status: SHIPPED | OUTPATIENT
Start: 2024-03-01 | End: 2024-03-20 | Stop reason: SDUPTHER

## 2024-03-01 RX ORDER — METFORMIN HYDROCHLORIDE 500 MG/1
500 TABLET ORAL DAILY
Qty: 90 TABLET | Refills: 1 | Status: SHIPPED | OUTPATIENT
Start: 2024-03-01 | End: 2024-08-28

## 2024-03-01 RX ORDER — SIMVASTATIN 20 MG/1
20 TABLET, FILM COATED ORAL NIGHTLY
Qty: 90 TABLET | Refills: 1 | Status: SHIPPED | OUTPATIENT
Start: 2024-03-01 | End: 2024-05-21 | Stop reason: SDUPTHER

## 2024-03-01 RX ORDER — INSULIN PUMP SYRINGE, 3 ML
EACH MISCELLANEOUS
OUTPATIENT
Start: 2024-03-01

## 2024-03-01 RX ORDER — FUROSEMIDE 40 MG/1
40 TABLET ORAL DAILY PRN
Qty: 90 TABLET | Refills: 1 | Status: SHIPPED | OUTPATIENT
Start: 2024-03-01 | End: 2024-08-28

## 2024-03-01 RX ORDER — AMIODARONE HYDROCHLORIDE 100 MG/1
100 TABLET ORAL DAILY
Qty: 90 TABLET | Refills: 1 | Status: SHIPPED | OUTPATIENT
Start: 2024-03-01 | End: 2024-05-17 | Stop reason: SDUPTHER

## 2024-03-01 RX ORDER — ATENOLOL 25 MG/1
25 TABLET ORAL DAILY
Qty: 90 TABLET | Refills: 1 | Status: SHIPPED | OUTPATIENT
Start: 2024-03-01 | End: 2024-05-17 | Stop reason: SDUPTHER

## 2024-03-01 RX ORDER — LISINOPRIL AND HYDROCHLOROTHIAZIDE 12.5; 2 MG/1; MG/1
2 TABLET ORAL DAILY
Qty: 180 TABLET | Refills: 1 | Status: SHIPPED | OUTPATIENT
Start: 2024-03-01 | End: 2024-08-28

## 2024-03-14 ENCOUNTER — OFFICE VISIT (OUTPATIENT)
Dept: INTERNAL MEDICINE | Facility: CLINIC | Age: 79
End: 2024-03-14
Payer: MEDICARE

## 2024-03-14 ENCOUNTER — TELEPHONE (OUTPATIENT)
Dept: INTERNAL MEDICINE | Facility: CLINIC | Age: 79
End: 2024-03-14
Payer: MEDICARE

## 2024-03-14 ENCOUNTER — HOSPITAL ENCOUNTER (OUTPATIENT)
Dept: CARDIOLOGY | Facility: HOSPITAL | Age: 79
Discharge: HOME | End: 2024-03-14
Payer: MEDICARE

## 2024-03-14 ENCOUNTER — HOSPITAL ENCOUNTER (EMERGENCY)
Facility: HOSPITAL | Age: 79
Discharge: HOME | End: 2024-03-14
Attending: EMERGENCY MEDICINE
Payer: MEDICARE

## 2024-03-14 VITALS
DIASTOLIC BLOOD PRESSURE: 60 MMHG | TEMPERATURE: 98 F | WEIGHT: 152.31 LBS | BODY MASS INDEX: 31.97 KG/M2 | HEART RATE: 88 BPM | HEIGHT: 58 IN | SYSTOLIC BLOOD PRESSURE: 98 MMHG | OXYGEN SATURATION: 97 %

## 2024-03-14 VITALS
TEMPERATURE: 98 F | BODY MASS INDEX: 31.84 KG/M2 | DIASTOLIC BLOOD PRESSURE: 62 MMHG | SYSTOLIC BLOOD PRESSURE: 127 MMHG | HEART RATE: 60 BPM | OXYGEN SATURATION: 100 % | WEIGHT: 152.31 LBS | RESPIRATION RATE: 18 BRPM

## 2024-03-14 DIAGNOSIS — I44.7 LEFT BUNDLE BRANCH BLOCK: ICD-10-CM

## 2024-03-14 DIAGNOSIS — I48.91 ATRIAL FIBRILLATION WITH RVR: Primary | ICD-10-CM

## 2024-03-14 DIAGNOSIS — R00.2 PALPITATION: ICD-10-CM

## 2024-03-14 DIAGNOSIS — R42 DIZZINESS: ICD-10-CM

## 2024-03-14 DIAGNOSIS — E11.69 TYPE 2 DIABETES MELLITUS WITH OTHER SPECIFIED COMPLICATION, WITHOUT LONG-TERM CURRENT USE OF INSULIN: ICD-10-CM

## 2024-03-14 DIAGNOSIS — I48.0 PAROXYSMAL ATRIAL FIBRILLATION: ICD-10-CM

## 2024-03-14 DIAGNOSIS — R00.2 PALPITATIONS: ICD-10-CM

## 2024-03-14 DIAGNOSIS — I10 ESSENTIAL HYPERTENSION: ICD-10-CM

## 2024-03-14 LAB
ALBUMIN SERPL BCP-MCNC: 3 G/DL (ref 3.5–5.2)
ALP SERPL-CCNC: 59 U/L (ref 55–135)
ALT SERPL W/O P-5'-P-CCNC: 10 U/L (ref 10–44)
ANION GAP SERPL CALC-SCNC: 10 MMOL/L (ref 8–16)
AST SERPL-CCNC: 13 U/L (ref 10–40)
BASOPHILS # BLD AUTO: 0.02 K/UL (ref 0–0.2)
BASOPHILS NFR BLD: 0.4 % (ref 0–1.9)
BILIRUB SERPL-MCNC: 0.6 MG/DL (ref 0.1–1)
BNP SERPL-MCNC: 879 PG/ML (ref 0–99)
BUN SERPL-MCNC: 15 MG/DL (ref 8–23)
CALCIUM SERPL-MCNC: 9 MG/DL (ref 8.7–10.5)
CHLORIDE SERPL-SCNC: 108 MMOL/L (ref 95–110)
CO2 SERPL-SCNC: 27 MMOL/L (ref 23–29)
CREAT SERPL-MCNC: 1.1 MG/DL (ref 0.5–1.4)
DIFFERENTIAL METHOD BLD: NORMAL
EOSINOPHIL # BLD AUTO: 0.1 K/UL (ref 0–0.5)
EOSINOPHIL NFR BLD: 1.6 % (ref 0–8)
ERYTHROCYTE [DISTWIDTH] IN BLOOD BY AUTOMATED COUNT: 13.8 % (ref 11.5–14.5)
EST. GFR  (NO RACE VARIABLE): 51.1 ML/MIN/1.73 M^2
GLUCOSE SERPL-MCNC: 122 MG/DL (ref 70–110)
HCT VFR BLD AUTO: 37.2 % (ref 37–48.5)
HGB BLD-MCNC: 12.1 G/DL (ref 12–16)
IMM GRANULOCYTES # BLD AUTO: 0.03 K/UL (ref 0–0.04)
IMM GRANULOCYTES NFR BLD AUTO: 0.5 % (ref 0–0.5)
LYMPHOCYTES # BLD AUTO: 1.5 K/UL (ref 1–4.8)
LYMPHOCYTES NFR BLD: 26.6 % (ref 18–48)
MCH RBC QN AUTO: 28.1 PG (ref 27–31)
MCHC RBC AUTO-ENTMCNC: 32.5 G/DL (ref 32–36)
MCV RBC AUTO: 87 FL (ref 82–98)
MONOCYTES # BLD AUTO: 0.4 K/UL (ref 0.3–1)
MONOCYTES NFR BLD: 7.8 % (ref 4–15)
NEUTROPHILS # BLD AUTO: 3.6 K/UL (ref 1.8–7.7)
NEUTROPHILS NFR BLD: 63.1 % (ref 38–73)
NRBC BLD-RTO: 0 /100 WBC
OHS QRS DURATION: 150 MS
OHS QTC CALCULATION: 604 MS
PLATELET # BLD AUTO: 221 K/UL (ref 150–450)
PMV BLD AUTO: 10 FL (ref 9.2–12.9)
POTASSIUM SERPL-SCNC: 3.4 MMOL/L (ref 3.5–5.1)
PROT SERPL-MCNC: 5.9 G/DL (ref 6–8.4)
RBC # BLD AUTO: 4.3 M/UL (ref 4–5.4)
SODIUM SERPL-SCNC: 145 MMOL/L (ref 136–145)
TROPONIN I SERPL DL<=0.01 NG/ML-MCNC: 0.01 NG/ML (ref 0–0.03)
WBC # BLD AUTO: 5.63 K/UL (ref 3.9–12.7)

## 2024-03-14 PROCEDURE — 99285 EMERGENCY DEPT VISIT HI MDM: CPT | Mod: 25,ER

## 2024-03-14 PROCEDURE — 93010 ELECTROCARDIOGRAM REPORT: CPT | Mod: S$GLB,,, | Performed by: INTERNAL MEDICINE

## 2024-03-14 PROCEDURE — 83880 ASSAY OF NATRIURETIC PEPTIDE: CPT | Mod: ER | Performed by: EMERGENCY MEDICINE

## 2024-03-14 PROCEDURE — 1126F AMNT PAIN NOTED NONE PRSNT: CPT | Mod: CPTII,S$GLB,, | Performed by: NURSE PRACTITIONER

## 2024-03-14 PROCEDURE — 84484 ASSAY OF TROPONIN QUANT: CPT | Mod: ER | Performed by: EMERGENCY MEDICINE

## 2024-03-14 PROCEDURE — 3074F SYST BP LT 130 MM HG: CPT | Mod: CPTII,S$GLB,, | Performed by: NURSE PRACTITIONER

## 2024-03-14 PROCEDURE — 3078F DIAST BP <80 MM HG: CPT | Mod: CPTII,S$GLB,, | Performed by: NURSE PRACTITIONER

## 2024-03-14 PROCEDURE — 3288F FALL RISK ASSESSMENT DOCD: CPT | Mod: CPTII,S$GLB,, | Performed by: NURSE PRACTITIONER

## 2024-03-14 PROCEDURE — 99999 PR PBB SHADOW E&M-EST. PATIENT-LVL IV: CPT | Mod: PBBFAC,,, | Performed by: NURSE PRACTITIONER

## 2024-03-14 PROCEDURE — 99214 OFFICE O/P EST MOD 30 MIN: CPT | Mod: S$GLB,,, | Performed by: NURSE PRACTITIONER

## 2024-03-14 PROCEDURE — 93005 ELECTROCARDIOGRAM TRACING: CPT | Mod: ER

## 2024-03-14 PROCEDURE — 93010 ELECTROCARDIOGRAM REPORT: CPT | Mod: ,,, | Performed by: INTERNAL MEDICINE

## 2024-03-14 PROCEDURE — 85025 COMPLETE CBC W/AUTO DIFF WBC: CPT | Mod: 91,ER | Performed by: EMERGENCY MEDICINE

## 2024-03-14 PROCEDURE — 80053 COMPREHEN METABOLIC PANEL: CPT | Mod: 91,ER | Performed by: EMERGENCY MEDICINE

## 2024-03-14 PROCEDURE — 93005 ELECTROCARDIOGRAM TRACING: CPT | Mod: PO

## 2024-03-14 PROCEDURE — 1101F PT FALLS ASSESS-DOCD LE1/YR: CPT | Mod: CPTII,S$GLB,, | Performed by: NURSE PRACTITIONER

## 2024-03-14 NOTE — ASSESSMENT & PLAN NOTE
Lab Results   Component Value Date    HGBA1C 5.0 11/17/2023     Well controlled. Continue metformin and lifestyle modifications.

## 2024-03-14 NOTE — PROGRESS NOTES
Subjective:       Patient ID: Guillermina Person is a 78 y.o. female.    Chief Complaint: Heart Problem (Heart racing)    Mrs. Person presents to clinic with her  for palpitations. Episode of heart racing and dizziness last night. Hx Afib s/p ablation 3 years prior. She took an additional atenolol last night and symptoms improved within 30 minutes. Today continues with intermittent dizziness and fatigue. Reports she has experienced similar episodes 3-4 times since ablation. She is established with cardiology, Dr. Guzmán, last appt 2023. Also notes she had a nose bleed 2 days prior which self resolved.    Palpitations   This is a new problem. The current episode started yesterday. The problem has been resolved. Nothing aggravates the symptoms. Associated symptoms include dizziness, an irregular heartbeat and malaise/fatigue. Pertinent negatives include no anxiety, chest fullness, chest pain, coughing, diaphoresis, fever, nausea, near-syncope, numbness, shortness of breath, syncope, vomiting or weakness. She has tried beta blockers for the symptoms. The treatment provided mild relief. Risk factors include diabetes mellitus, dyslipidemia, obesity and sedentary lifestyle. Her past medical history is significant for heart disease.       Patient Active Problem List   Diagnosis    Essential hypertension    History of uterine cancer    Left bundle branch block    Paroxysmal atrial fibrillation    Primary osteoarthritis of both knees    Type 2 diabetes mellitus with other specified complication    Class 1 obesity due to excess calories with serious comorbidity and body mass index (BMI) of 31.0 to 31.9 in adult    Mixed hyperlipidemia    Osteopenia       History reviewed. No pertinent family history.  Past Surgical History:   Procedure Laterality Date    ABDOMINAL SURGERY      APPENDECTOMY      BRAIN SURGERY       SECTION      HYSTERECTOMY      OOPHORECTOMY      TONSILLECTOMY           Current Outpatient  Medications:     albuterol (PROVENTIL/VENTOLIN HFA) 90 mcg/actuation inhaler, Inhale 1-2 puffs into the lungs every 6 (six) hours as needed for Wheezing or Shortness of Breath. Rescue, Disp: 8 g, Rfl: 2    amiodarone (PACERONE) 100 MG Tab, Take 1 tablet (100 mg total) by mouth once daily., Disp: 90 tablet, Rfl: 1    apixaban (ELIQUIS) 5 mg Tab, Take 1 tablet (5 mg total) by mouth 2 (two) times daily., Disp: 180 tablet, Rfl: 1    atenoloL (TENORMIN) 25 MG tablet, Take 1 tablet (25 mg total) by mouth once daily., Disp: 90 tablet, Rfl: 1    blood sugar diagnostic Strp, Test one time daily, Disp: , Rfl:     blood-glucose meter kit, USE AS DIRECTED, Disp: , Rfl:     fish-flx-prm-blkbor-om 3,6,9 5 400-400-200 mg Cap, Take 1 tablet by mouth once daily., Disp: , Rfl:     furosemide (LASIX) 40 MG tablet, Take 1 tablet (40 mg total) by mouth daily as needed., Disp: 90 tablet, Rfl: 1    lancets 33 gauge Misc, Test one time daily, Disp: , Rfl:     lisinopriL-hydrochlorothiazide (PRINZIDE,ZESTORETIC) 20-12.5 mg per tablet, Take 2 tablets by mouth once daily., Disp: 180 tablet, Rfl: 1    loratadine (CLARITIN REDITABS) 10 mg dissolvable tablet, Take 1 tablet by mouth daily as needed., Disp: , Rfl:     metFORMIN (GLUCOPHAGE) 500 MG tablet, Take 1 tablet (500 mg total) by mouth once daily., Disp: 90 tablet, Rfl: 1    pulse oximeter (PULSE OXIMETER) device, Use twice daily at 8 AM and 3 PM and record the value in [facility] as directed., Disp: 1 each, Rfl: 0    simvastatin (ZOCOR) 20 MG tablet, Take 1 tablet (20 mg total) by mouth every evening., Disp: 90 tablet, Rfl: 1    Review of Systems   Constitutional:  Positive for activity change, fatigue and malaise/fatigue. Negative for diaphoresis and fever.   Respiratory:  Negative for cough and shortness of breath.    Cardiovascular:  Positive for palpitations. Negative for chest pain, leg swelling, syncope and near-syncope.   Gastrointestinal:  Negative for nausea and vomiting.  "  Musculoskeletal:  Negative for myalgias.   Neurological:  Positive for dizziness and light-headedness. Negative for syncope, weakness and numbness.   Psychiatric/Behavioral:  The patient is not nervous/anxious.        Objective:   BP 98/60 (BP Location: Left arm, Patient Position: Sitting, BP Method: Medium (Manual))   Pulse 88   Temp 97.6 °F (36.4 °C) (Tympanic)   Ht 4' 10" (1.473 m)   Wt 69.1 kg (152 lb 5.4 oz)   SpO2 97%   BMI 31.84 kg/m²      Physical Exam  Vitals reviewed.   Constitutional:       General: She is not in acute distress.     Appearance: Normal appearance. She is not ill-appearing.   HENT:      Head: Normocephalic.   Eyes:      Conjunctiva/sclera: Conjunctivae normal.   Cardiovascular:      Rate and Rhythm: Normal rate. Rhythm irregular.      Heart sounds: Normal heart sounds. No murmur heard.  Pulmonary:      Effort: Pulmonary effort is normal. No respiratory distress.      Breath sounds: Normal breath sounds. No wheezing, rhonchi or rales.   Musculoskeletal:      Right lower leg: No edema.      Left lower leg: No edema.   Skin:     Coloration: Skin is pale.   Neurological:      Mental Status: She is alert and oriented to person, place, and time.   Psychiatric:         Behavior: Behavior normal.         Assessment & Plan     1. Atrial fibrillation with RVR  Comments:  EKG: Afib with RVR. Symptomatic with hypotension and dizziness. Patient notified and advised to proceed to ER. Verbalized understanding. Report given to ER.    2. Palpitation  Comments:  Labs and EKG today.  Orders:  -     IN OFFICE EKG 12-LEAD (to Muse)  -     TSH; Future; Expected date: 03/14/2024    3. Dizziness  Comments:  Labs and EKG today  Orders:  -     IN OFFICE EKG 12-LEAD (to Muse)  -     CBC W/ AUTO DIFFERENTIAL; Future; Expected date: 03/14/2024  -     COMPREHENSIVE METABOLIC PANEL; Future; Expected date: 03/14/2024    4. Essential hypertension  Assessment & Plan:  Hypotensive today in clinic. EKG and labs today. " "She took an additional dose of atenolol last night.       5. Left bundle branch block  Assessment & Plan:  Repeat EKG today. Followed by cardiology      6. Paroxysmal atrial fibrillation  Assessment & Plan:  Rate controlled today in clinic. Episode of palpitations last night with residual dizziness and fatigue. Additional atenolol taken last night. EKG and labs today. Continue amiodarone, Eliquis, and atenolol. Needs sooner follow up with cardiology.    Orders:  -     IN OFFICE EKG 12-LEAD (to Muse)    7. Type 2 diabetes mellitus with other specified complication, without long-term current use of insulin  Assessment & Plan:  Lab Results   Component Value Date    HGBA1C 5.0 11/17/2023     Well controlled. Continue metformin and lifestyle modifications.            JAY JAY Yanez      Portions of this note may have been created with voice recognition software. Occasional "wrong-word" or "sound-a-like" substitutions may have occurred due to the inherent limitations of voice recognition software. Please, read the note carefully and recognize, using context, where substitutions have occurred.     "

## 2024-03-14 NOTE — ASSESSMENT & PLAN NOTE
Hypotensive today in clinic. EKG and labs today. She took an additional dose of atenolol last night.

## 2024-03-14 NOTE — ED PROVIDER NOTES
Encounter Date: 3/14/2024       History     Chief Complaint   Patient presents with    Atrial Fibrillation     Pt was at clinic earlier and was in a fib with rate in 120s. Pt was called by provider to come back to er. Pt does admit to heart racing last night and some this morning but it has stopped. Pt denies any pain or SOB. Compliant with elequis and fluid pill. In sinus rhythm on er arrival.      The history is provided by the patient.   Atrial Fibrillation  This is a recurrent problem. The current episode started less than 1 hour ago. The problem occurs rarely. The problem has been resolved. Pertinent negatives include no chest pain, no abdominal pain, no headaches and no shortness of breath. Nothing aggravates the symptoms. Nothing relieves the symptoms.     Review of patient's allergies indicates:   Allergen Reactions    Augmentin [amoxicillin-pot clavulanate]      Fixed drug reaction      Amoxicillin Itching, Rash and Other (See Comments)     Yeast infection     Past Medical History:   Diagnosis Date    Atrial flutter     Brain tumor (benign)     Cancer     Coronary artery disease     Diabetes mellitus     High cholesterol     Hypertension      Past Surgical History:   Procedure Laterality Date    ABDOMINAL SURGERY      APPENDECTOMY      BRAIN SURGERY       SECTION      HYSTERECTOMY      OOPHORECTOMY      TONSILLECTOMY       No family history on file.  Social History     Tobacco Use    Smoking status: Never   Substance Use Topics    Alcohol use: No    Drug use: No     Review of Systems   Constitutional:  Negative for fever.   HENT:  Negative for sore throat.    Respiratory:  Negative for shortness of breath.    Cardiovascular:  Positive for palpitations. Negative for chest pain.   Gastrointestinal:  Negative for abdominal pain and nausea.   Genitourinary:  Negative for dysuria.   Musculoskeletal:  Negative for back pain.   Skin:  Negative for rash.   Neurological:  Negative for weakness and headaches.    Hematological:  Does not bruise/bleed easily.       Physical Exam     Initial Vitals   BP Pulse Resp Temp SpO2   03/14/24 1525 03/14/24 1515 03/14/24 1525 03/14/24 1525 03/14/24 1525   115/63 62 16 98 °F (36.7 °C) 97 %      MAP       --                Physical Exam    Nursing note and vitals reviewed.  Constitutional: She appears well-developed and well-nourished. No distress.   HENT:   Head: Normocephalic and atraumatic.   Mouth/Throat: Oropharynx is clear and moist.   Eyes: Conjunctivae and EOM are normal. Pupils are equal, round, and reactive to light.   Neck: Neck supple.   Normal range of motion.  Cardiovascular:  Normal rate, regular rhythm and normal heart sounds.           Pulmonary/Chest: Breath sounds normal. No respiratory distress.   Abdominal: Abdomen is soft. Bowel sounds are normal. She exhibits no distension. There is no abdominal tenderness.   Musculoskeletal:         General: Normal range of motion.      Cervical back: Normal range of motion and neck supple.     Neurological: She is alert and oriented to person, place, and time. She has normal strength.   Skin: Skin is warm and dry.   Psychiatric: She has a normal mood and affect. Thought content normal.         ED Course   Procedures  Labs Reviewed   COMPREHENSIVE METABOLIC PANEL - Abnormal; Notable for the following components:       Result Value    Potassium 3.4 (*)     Glucose 122 (*)     Total Protein 5.9 (*)     Albumin 3.0 (*)     eGFR 51.1 (*)     All other components within normal limits   B-TYPE NATRIURETIC PEPTIDE - Abnormal; Notable for the following components:     (*)     All other components within normal limits   CBC W/ AUTO DIFFERENTIAL   TROPONIN I     EKG Readings: (Independently Interpreted)   Rhythm: Sinus Bradycardia. Heart Rate: 59. ST Segments: Normal ST Segments. T Waves: Normal. Axis: Left Axis Deviation. Clinical Impression: Normal Sinus Rhythm     ECG Results              EKG 12-lead (In process)         Collection Time Result Time QRS Duration OHS QTC Calculation    03/14/24 15:49:29 03/14/24 16:11:05 158 500                     In process by Interface, Lab In [facility] (03/14/24 16:11:14)                   Narrative:    Test Reason : R06.02,    Vent. Rate : 059 BPM     Atrial Rate : 059 BPM     P-R Int : 204 ms          QRS Dur : 158 ms      QT Int : 506 ms       P-R-T Axes : 043 -53 120 degrees     QTc Int : 500 ms    Sinus bradycardia  Left axis deviation  Left bundle branch block  Abnormal ECG  When compared with ECG of 14-MAR-2024 14:13,  Sinus rhythm has replaced Atrial fibrillation  Vent. rate has decreased BY  63 BPM  Nonspecific T wave abnormality no longer evident in Inferior leads  T wave inversion less evident in Lateral leads    Referred By: [email or web address]   SELF           Confirmed By:                                   Imaging Results              X-Ray Chest AP Portable (Final result)  Result time 03/14/24 16:37:05      Final result by Lisa DiazGil), MD (03/14/24 16:37:05)                   Impression:      Clear lungs.      Electronically signed by: Lisa Diaz MD  Date:    03/14/2024  Time:    16:37               Narrative:    EXAMINATION:  XR CHEST AP PORTABLE    CLINICAL HISTORY:  Congestive heart failure, CHF;    COMPARISON:  Chest, 03/01/2022.    FINDINGS:  One view.  Stable cardiomegaly.  Clear lungs.                                  Pt feels improved, NAD, reports several episodes of intermittent palpitations. Instructed to follow up with Cardiology. NSR.    5:12 PM - Counseling: Spoke with the patient and discussed todays findings, in addition to providing specific details for the plan of care and counseling regarding the diagnosis and prognosis. Questions are answered at this time.        Medications - No data to display  Medical Decision Making  Problems Addressed:  Atrial fibrillation with RVR: chronic illness or injury  Palpitations: acute illness or injury    Amount and/or  Complexity of Data Reviewed  Labs: ordered.  Radiology: ordered.  ECG/medicine tests: ordered and independent interpretation performed. Decision-making details documented in ED Course.                                      Clinical Impression:  Final diagnoses:  [R00.2] Palpitations  [I48.91] Atrial fibrillation with RVR (Primary)          ED Disposition Condition    Discharge Stable          ED Prescriptions    None       Follow-up Information       Follow up With Specialties Details Why Contact Info    PCP  Call in 2 days      Cardiology  Call in 3 days      [facility] - Emergency Dept Emergency Medicine  If symptoms worsen [id number] [address] [id number] [phone]             Drew Fonseca MD  03/14/24 1145

## 2024-03-14 NOTE — ASSESSMENT & PLAN NOTE
Rate controlled today in clinic. Episode of palpitations last night with residual dizziness and fatigue. Additional atenolol taken last night. EKG and labs today. Continue amiodarone, Eliquis, and atenolol. Needs sooner follow up with cardiology.

## 2024-03-15 LAB
OHS QRS DURATION: 158 MS
OHS QTC CALCULATION: 500 MS

## 2024-03-20 ENCOUNTER — TELEPHONE (OUTPATIENT)
Dept: INTERNAL MEDICINE | Facility: CLINIC | Age: 79
End: 2024-03-20
Payer: MEDICARE

## 2024-03-20 ENCOUNTER — OFFICE VISIT (OUTPATIENT)
Dept: CARDIOLOGY | Facility: CLINIC | Age: 79
End: 2024-03-20
Payer: MEDICARE

## 2024-03-20 VITALS
BODY MASS INDEX: 31.79 KG/M2 | HEIGHT: 58 IN | OXYGEN SATURATION: 98 % | SYSTOLIC BLOOD PRESSURE: 122 MMHG | DIASTOLIC BLOOD PRESSURE: 70 MMHG | WEIGHT: 151.44 LBS | HEART RATE: 60 BPM

## 2024-03-20 DIAGNOSIS — Z85.42 HISTORY OF UTERINE CANCER: ICD-10-CM

## 2024-03-20 DIAGNOSIS — E78.2 MIXED HYPERLIPIDEMIA: ICD-10-CM

## 2024-03-20 DIAGNOSIS — I10 ESSENTIAL HYPERTENSION: ICD-10-CM

## 2024-03-20 DIAGNOSIS — I48.0 PAROXYSMAL ATRIAL FIBRILLATION: Primary | ICD-10-CM

## 2024-03-20 DIAGNOSIS — I44.7 LEFT BUNDLE BRANCH BLOCK: ICD-10-CM

## 2024-03-20 DIAGNOSIS — R06.00 DYSPNEA, UNSPECIFIED TYPE: ICD-10-CM

## 2024-03-20 PROCEDURE — 3074F SYST BP LT 130 MM HG: CPT | Mod: CPTII,S$GLB,, | Performed by: INTERNAL MEDICINE

## 2024-03-20 PROCEDURE — 1126F AMNT PAIN NOTED NONE PRSNT: CPT | Mod: CPTII,S$GLB,, | Performed by: INTERNAL MEDICINE

## 2024-03-20 PROCEDURE — 3288F FALL RISK ASSESSMENT DOCD: CPT | Mod: CPTII,S$GLB,, | Performed by: INTERNAL MEDICINE

## 2024-03-20 PROCEDURE — 3078F DIAST BP <80 MM HG: CPT | Mod: CPTII,S$GLB,, | Performed by: INTERNAL MEDICINE

## 2024-03-20 PROCEDURE — 99999 PR PBB SHADOW E&M-EST. PATIENT-LVL V: CPT | Mod: PBBFAC,,, | Performed by: INTERNAL MEDICINE

## 2024-03-20 PROCEDURE — 1159F MED LIST DOCD IN RCRD: CPT | Mod: CPTII,S$GLB,, | Performed by: INTERNAL MEDICINE

## 2024-03-20 PROCEDURE — 99214 OFFICE O/P EST MOD 30 MIN: CPT | Mod: S$GLB,,, | Performed by: INTERNAL MEDICINE

## 2024-03-20 PROCEDURE — 1100F PTFALLS ASSESS-DOCD GE2>/YR: CPT | Mod: CPTII,S$GLB,, | Performed by: INTERNAL MEDICINE

## 2024-03-20 RX ORDER — ALBUTEROL SULFATE 90 UG/1
1-2 AEROSOL, METERED RESPIRATORY (INHALATION) EVERY 6 HOURS PRN
Qty: 18 G | Refills: 1 | Status: SHIPPED | OUTPATIENT
Start: 2024-03-20 | End: 2024-03-27 | Stop reason: SDUPTHER

## 2024-03-20 NOTE — PROGRESS NOTES
Subjective:   Patient ID:  Guillermina Person is a 78 y.o. female who presents for evaluation of Bleeding/Bruising (Nose bleeding)      HPI  3.20.2024  She was in the emergency room on March 14th she was in AFib with RVR.  Converted back to sinus and was discharged home.    She states that she usually takes atenolol and amiodarone every day but takes an extra dose of atenolol when she is in AFib.    She states that she feels her palpitations when she is in it.    No chest pain.  No dyspnea.        12.2023  78-year-old female.  Comes in to establish care with new doctor today.  I reviewed her records from Dr. Taylor at [facility].  Last saw him in June.  She has history of old left bundle-branch block that has been worked up in the past and found not to be ischemic.    She deals also with hernia.  However she has not very symptomatic.  She had seen general surgery recently.  And she was to continue conservative treatment.    Her functional status is very good.  Denies any exertional angina, dyspnea on exertion.  Lower extremity swelling.  No recent palpitations.  Syncope or presyncope.  She had 2 mechanical falls within a year however the last 1 was almost more than 7 months ago.  I reviewed Dr. Taylor's note mentioning possible referral for Watchman device however patient not interested and she does not have truly frequent falls per her history she was trying to grab something off the floor when something slid from underneath her.  Parox atrial flutter (atrial flutter ablation by Dr. Uriarte summer 2020.)  HTN  HLD   LBBB  2010 Cath [facility] for new LBBB- nonobstructive reportedly  JAYLIN in 1987 for endometrial cancer with no chemo or RT.  Sp R cranial meningioma s/p resection   Mild LV dysfx 45-50% on echo in 2020/Normalized in 2022 July       Past Medical History:   Diagnosis Date    Atrial flutter     Brain tumor (benign)     Cancer     Coronary artery disease     Diabetes mellitus     High cholesterol      Hypertension        Past Surgical History:   Procedure Laterality Date    ABDOMINAL SURGERY      APPENDECTOMY      BRAIN SURGERY       SECTION      HYSTERECTOMY      OOPHORECTOMY      TONSILLECTOMY         Social History     Tobacco Use    Smoking status: Never   Substance Use Topics    Alcohol use: No    Drug use: No       History reviewed. No pertinent family history.    Review of Systems   Cardiovascular:  Negative for chest pain, dyspnea on exertion, palpitations and syncope.   Genitourinary: Negative.    Neurological: Negative.        Current Outpatient Medications on File Prior to Visit   Medication Sig    amiodarone (PACERONE) 100 MG Tab Take 1 tablet (100 mg total) by mouth once daily.    apixaban (ELIQUIS) 5 mg Tab Take 1 tablet (5 mg total) by mouth 2 (two) times daily.    atenoloL (TENORMIN) 25 MG tablet Take 1 tablet (25 mg total) by mouth once daily.    blood sugar diagnostic Strp Test one time daily    blood-glucose meter kit USE AS DIRECTED    fish-flx-prm-blkbor-om 3,6,9 5 400-400-200 mg Cap Take 1 tablet by mouth once daily.    furosemide (LASIX) 40 MG tablet Take 1 tablet (40 mg total) by mouth daily as needed.    lancets 33 gauge Misc Test one time daily    lisinopriL-hydrochlorothiazide (PRINZIDE,ZESTORETIC) 20-12.5 mg per tablet Take 2 tablets by mouth once daily.    loratadine (CLARITIN REDITABS) 10 mg dissolvable tablet Take 1 tablet by mouth daily as needed.    metFORMIN (GLUCOPHAGE) 500 MG tablet Take 1 tablet (500 mg total) by mouth once daily.    pulse oximeter (PULSE OXIMETER) device Use twice daily at 8 AM and 3 PM and record the value in [employer] as directed.    simvastatin (ZOCOR) 20 MG tablet Take 1 tablet (20 mg total) by mouth every evening.    [DISCONTINUED] albuterol (PROVENTIL/VENTOLIN HFA) 90 mcg/actuation inhaler Inhale 1-2 puffs into the lungs every 6 (six) hours as needed for Wheezing or Shortness of Breath. Rescue     No current facility-administered medications on  file prior to visit.       Objective:   Objective:  Wt Readings from Last 3 Encounters:   03/20/24 68.7 kg (151 lb 7.3 oz)   03/14/24 69.1 kg (152 lb 5.4 oz)   03/14/24 69.1 kg (152 lb 5.4 oz)     Temp Readings from Last 3 Encounters:   03/14/24 98 °F (36.7 °C) (Oral)   03/14/24 97.6 °F (36.4 °C) (Tympanic)   11/17/23 98.1 °F (36.7 °C) (Temporal)     BP Readings from Last 3 Encounters:   03/20/24 122/70   03/14/24 127/62   03/14/24 98/60     Pulse Readings from Last 3 Encounters:   03/20/24 60   03/14/24 60   03/14/24 88       Physical Exam  Vitals reviewed.   Constitutional:       Appearance: She is well-developed.   Neck:      Vascular: No carotid bruit.   Cardiovascular:      Rate and Rhythm: Normal rate and regular rhythm.      Pulses: Intact distal pulses.      Heart sounds: Normal heart sounds. No murmur heard.  Pulmonary:      Breath sounds: Normal breath sounds.   Neurological:      Mental Status: She is oriented to person, place, and time.         Lab Results   Component Value Date    CHOL 160 11/17/2023    CHOL 178 02/27/2023    CHOL 173 02/22/2022     Lab Results   Component Value Date    HDL 63 11/17/2023    HDL 68 02/27/2023    HDL 73 02/22/2022     Lab Results   Component Value Date    LDLCALC 83.0 11/17/2023    LDLCALC 94 02/27/2023    LDLCALC 87 02/22/2022     Lab Results   Component Value Date    TRIG 70 11/17/2023    TRIG 88 02/27/2023    TRIG 71 02/22/2022     Lab Results   Component Value Date    CHOLHDL 39.4 11/17/2023       Chemistry        Component Value Date/Time     03/14/2024 1605    K 3.4 (L) 03/14/2024 1605     03/14/2024 1605    CO2 27 03/14/2024 1605    BUN 15 03/14/2024 1605    CREATININE 1.1 03/14/2024 1605     (H) 03/14/2024 1605        Component Value Date/Time    CALCIUM 9.0 03/14/2024 1605    ALKPHOS 59 03/14/2024 1605    AST 13 03/14/2024 1605    ALT 10 03/14/2024 1605    BILITOT 0.6 03/14/2024 1605    ESTGFRAFRICA 88 07/17/2020 1015    ESTGFRAFRICA >60.0  07/11/2015 0915    EGFRNONAA >60.0 07/11/2015 0915          Lab Results   Component Value Date    TSH 3.476 03/14/2024     Lab Results   Component Value Date    INR 1.6 07/17/2020    INR 1.0 07/11/2015     Lab Results   Component Value Date    WBC 5.63 03/14/2024    HGB 12.1 03/14/2024    HCT 37.2 03/14/2024    MCV 87 03/14/2024     03/14/2024     BNP  @LABRCNTIP(BNP,BNPTRIAGEBLO)@  Estimated Creatinine Clearance: 36.5 mL/min (based on SCr of 1.1 mg/dL).     Imaging:  ======    No results found for this or any previous visit.    No results found for this or any previous visit.    Results for orders placed during the hospital encounter of 11/29/16    X-Ray Chest PA And Lateral    Narrative  Findings: 2 views. Comparison June 7, 2010. Heart size stable. No new consolidation seen within the lungs. Mild prominence of the fissures, particularly the minor fissure new from prior radiograph. Multilevel degenerative change in the thoracic spine.  IMPRESSION:  No definite acute cardiopulmonary disease.      Electronically signed by: JASON HENDRIX MD  Date:     11/29/16  Time:    11:32    No results found for this or any previous visit.    No valid procedures specified.    No results found for this or any previous visit.      No results found for this or any previous visit.      No results found for this or any previous visit.      Diagnostic Results:  ECG: Reviewed    The 10-year ASCVD risk score (Santosh DK, et al., 2019) is: 49.4%    Values used to calculate the score:      Age: 78 years      Sex: Female      Is Non- : No      Diabetic: Yes      Tobacco smoker: No      Systolic Blood Pressure: 122 mmHg      Is BP treated: Yes      HDL Cholesterol: 63 mg/dL      Total Cholesterol: 160 mg/dL        Assessment and Plan:   Paroxysmal atrial fibrillation  -     Cancel: ACCEPT - Ambulatory referral/consult to Cardiac Electrophysiology; Future; Expected date: 03/27/2024  -     Cancel: ACCEPT -  Ambulatory referral/consult to Cardiac Electrophysiology; Future; Expected date: 03/27/2024  -     ACCEPT - Ambulatory referral/consult to Cardiac Electrophysiology; Future; Expected date: 03/27/2024    Essential hypertension    Mixed hyperlipidemia    Left bundle branch block    History of uterine cancer        Continue with Eliquis, amiodarone, atenolol.  Referral to EP.  In sinus today  Reviewed all tests and above medical conditions with patient in detail and formulated treatment plan.  Risk factor modification discussed.   Cardiac low salt diet discussed.  Maintaining healthy weight and weight loss goals were discussed in clinic.    Follow up in  6 months

## 2024-03-27 ENCOUNTER — OFFICE VISIT (OUTPATIENT)
Dept: INTERNAL MEDICINE | Facility: CLINIC | Age: 79
End: 2024-03-27
Payer: MEDICARE

## 2024-03-27 VITALS
HEART RATE: 60 BPM | WEIGHT: 148.56 LBS | DIASTOLIC BLOOD PRESSURE: 88 MMHG | HEIGHT: 58 IN | TEMPERATURE: 98 F | BODY MASS INDEX: 31.19 KG/M2 | OXYGEN SATURATION: 95 % | SYSTOLIC BLOOD PRESSURE: 174 MMHG

## 2024-03-27 DIAGNOSIS — J40 BRONCHITIS: Primary | ICD-10-CM

## 2024-03-27 DIAGNOSIS — E11.69 TYPE 2 DIABETES MELLITUS WITH OTHER SPECIFIED COMPLICATION, WITHOUT LONG-TERM CURRENT USE OF INSULIN: ICD-10-CM

## 2024-03-27 DIAGNOSIS — I10 ESSENTIAL HYPERTENSION: ICD-10-CM

## 2024-03-27 DIAGNOSIS — R06.00 DYSPNEA, UNSPECIFIED TYPE: ICD-10-CM

## 2024-03-27 DIAGNOSIS — I48.0 PAROXYSMAL ATRIAL FIBRILLATION: ICD-10-CM

## 2024-03-27 PROCEDURE — 3288F FALL RISK ASSESSMENT DOCD: CPT | Mod: CPTII,S$GLB,, | Performed by: NURSE PRACTITIONER

## 2024-03-27 PROCEDURE — 99999 PR PBB SHADOW E&M-EST. PATIENT-LVL V: CPT | Mod: PBBFAC,,, | Performed by: NURSE PRACTITIONER

## 2024-03-27 PROCEDURE — 1126F AMNT PAIN NOTED NONE PRSNT: CPT | Mod: CPTII,S$GLB,, | Performed by: NURSE PRACTITIONER

## 2024-03-27 PROCEDURE — 1160F RVW MEDS BY RX/DR IN RCRD: CPT | Mod: CPTII,S$GLB,, | Performed by: NURSE PRACTITIONER

## 2024-03-27 PROCEDURE — 1101F PT FALLS ASSESS-DOCD LE1/YR: CPT | Mod: CPTII,S$GLB,, | Performed by: NURSE PRACTITIONER

## 2024-03-27 PROCEDURE — 3077F SYST BP >= 140 MM HG: CPT | Mod: CPTII,S$GLB,, | Performed by: NURSE PRACTITIONER

## 2024-03-27 PROCEDURE — 99214 OFFICE O/P EST MOD 30 MIN: CPT | Mod: S$GLB,,, | Performed by: NURSE PRACTITIONER

## 2024-03-27 PROCEDURE — 1159F MED LIST DOCD IN RCRD: CPT | Mod: CPTII,S$GLB,, | Performed by: NURSE PRACTITIONER

## 2024-03-27 PROCEDURE — 3079F DIAST BP 80-89 MM HG: CPT | Mod: CPTII,S$GLB,, | Performed by: NURSE PRACTITIONER

## 2024-03-27 RX ORDER — PROMETHAZINE HYDROCHLORIDE AND DEXTROMETHORPHAN HYDROBROMIDE 6.25; 15 MG/5ML; MG/5ML
5 SYRUP ORAL EVERY 6 HOURS PRN
Qty: 180 ML | Refills: 0 | Status: SHIPPED | OUTPATIENT
Start: 2024-03-27 | End: 2024-04-05

## 2024-03-27 RX ORDER — ALBUTEROL SULFATE 90 UG/1
1-2 AEROSOL, METERED RESPIRATORY (INHALATION) EVERY 6 HOURS PRN
Qty: 20.1 G | Refills: 1 | Status: SHIPPED | OUTPATIENT
Start: 2024-03-27 | End: 2024-09-23

## 2024-03-27 RX ORDER — DOXYCYCLINE 100 MG/1
100 CAPSULE ORAL 2 TIMES DAILY
Qty: 14 CAPSULE | Refills: 0 | Status: SHIPPED | OUTPATIENT
Start: 2024-03-27 | End: 2024-04-03

## 2024-03-27 RX ORDER — BENZONATATE 100 MG/1
100 CAPSULE ORAL 3 TIMES DAILY PRN
Qty: 30 CAPSULE | Refills: 0 | Status: SHIPPED | OUTPATIENT
Start: 2024-03-27 | End: 2024-04-06

## 2024-03-27 NOTE — ASSESSMENT & PLAN NOTE
Lab Results   Component Value Date    HGBA1C 5.0 11/17/2023     Well controlled. Continue metformin.

## 2024-03-27 NOTE — PROGRESS NOTES
Subjective:       Patient ID: Guillermina Person is a 78 y.o. female.    Chief Complaint: Cough    Mrs. Person presents to clinic for persistent productive cough x 2 weeks. Taking OTC Mucinex and cough syrup without relief. Denies fever, chills, or body aches. Denies chest pain or shortness of breath. Blood pressure significantly elevated today in clinic, previously normal. Reports compliance with medication. Does not frequently monitor blood pressure, but states yesterday reading was in 140s/80s. Admits she is upset with pharmacy continuing to not send a 90 day supply of albuterol. Requesting steroid and antibiotic injections.         Patient Active Problem List   Diagnosis    Essential hypertension    History of uterine cancer    Left bundle branch block    Paroxysmal atrial fibrillation    Primary osteoarthritis of both knees    Type 2 diabetes mellitus with other specified complication    Class 1 obesity due to excess calories with serious comorbidity and body mass index (BMI) of 31.0 to 31.9 in adult    Mixed hyperlipidemia    Osteopenia       History reviewed. No pertinent family history.  Past Surgical History:   Procedure Laterality Date    ABDOMINAL SURGERY      APPENDECTOMY      BRAIN SURGERY       SECTION      HYSTERECTOMY      OOPHORECTOMY      TONSILLECTOMY           Current Outpatient Medications:     amiodarone (PACERONE) 100 MG Tab, Take 1 tablet (100 mg total) by mouth once daily., Disp: 90 tablet, Rfl: 1    apixaban (ELIQUIS) 5 mg Tab, Take 1 tablet (5 mg total) by mouth 2 (two) times daily., Disp: 180 tablet, Rfl: 1    atenoloL (TENORMIN) 25 MG tablet, Take 1 tablet (25 mg total) by mouth once daily., Disp: 90 tablet, Rfl: 1    blood sugar diagnostic Strp, Test one time daily, Disp: , Rfl:     blood-glucose meter kit, USE AS DIRECTED, Disp: , Rfl:     fish-flx-prm-blkbor-om 3,6,9 5 400-400-200 mg Cap, Take 1 tablet by mouth once daily., Disp: , Rfl:     furosemide (LASIX) 40 MG tablet, Take 1  tablet (40 mg total) by mouth daily as needed., Disp: 90 tablet, Rfl: 1    lancets 33 gauge Misc, Test one time daily, Disp: , Rfl:     lisinopriL-hydrochlorothiazide (PRINZIDE,ZESTORETIC) 20-12.5 mg per tablet, Take 2 tablets by mouth once daily., Disp: 180 tablet, Rfl: 1    loratadine (CLARITIN REDITABS) 10 mg dissolvable tablet, Take 1 tablet by mouth daily as needed., Disp: , Rfl:     metFORMIN (GLUCOPHAGE) 500 MG tablet, Take 1 tablet (500 mg total) by mouth once daily., Disp: 90 tablet, Rfl: 1    pulse oximeter (PULSE OXIMETER) device, Use twice daily at 8 AM and 3 PM and record the value in MyChart as directed., Disp: 1 each, Rfl: 0    simvastatin (ZOCOR) 20 MG tablet, Take 1 tablet (20 mg total) by mouth every evening., Disp: 90 tablet, Rfl: 1    albuterol (PROVENTIL/VENTOLIN HFA) 90 mcg/actuation inhaler, Inhale 1-2 puffs into the lungs every 6 (six) hours as needed for Wheezing or Shortness of Breath. Rescue, Disp: 20.1 g, Rfl: 1    benzonatate (TESSALON) 100 MG capsule, Take 1 capsule (100 mg total) by mouth 3 (three) times daily as needed for Cough., Disp: 30 capsule, Rfl: 0    doxycycline (MONODOX) 100 MG capsule, Take 1 capsule (100 mg total) by mouth 2 (two) times daily. for 7 days, Disp: 14 capsule, Rfl: 0    promethazine-dextromethorphan (PROMETHAZINE-DM) 6.25-15 mg/5 mL Syrp, Take 5 mLs by mouth every 6 (six) hours as needed (cough)., Disp: 180 mL, Rfl: 0    Review of Systems   Constitutional:  Positive for fatigue. Negative for chills and fever.   HENT:  Positive for congestion. Negative for postnasal drip and sore throat.    Respiratory:  Positive for cough. Negative for shortness of breath.    Cardiovascular:  Negative for chest pain, palpitations and leg swelling.   Musculoskeletal:  Negative for myalgias.   Skin:  Negative for rash.   Neurological:  Negative for weakness.       Objective:   BP (!) 174/88 (BP Location: Left arm, Patient Position: Sitting, BP Method: Medium (Manual))   Pulse 60  "  Temp 97.9 °F (36.6 °C) (Tympanic)   Ht 4' 10" (1.473 m)   Wt 67.4 kg (148 lb 9.4 oz)   SpO2 95%   BMI 31.06 kg/m²      Physical Exam  Vitals reviewed.   Constitutional:       General: She is not in acute distress.     Appearance: She is ill-appearing (mild). She is not toxic-appearing or diaphoretic.   HENT:      Nose: Congestion present.      Mouth/Throat:      Pharynx: Posterior oropharyngeal erythema (mild) present.   Eyes:      Conjunctiva/sclera: Conjunctivae normal.   Cardiovascular:      Rate and Rhythm: Normal rate and regular rhythm.      Heart sounds: Normal heart sounds. No murmur heard.  Pulmonary:      Effort: Pulmonary effort is normal. No respiratory distress.      Breath sounds: Normal breath sounds. No wheezing, rhonchi or rales.      Comments: +frequent cough  Neurological:      Mental Status: She is alert and oriented to person, place, and time.      Gait: Gait normal.         Assessment & Plan     1. Bronchitis  Comments:  Avoid IM and oral steroids due to HTN. Continue supportive care. Avoid OTC cough suppressants due to HTN. RTC precautions reviewed.  Orders:  -     albuterol (PROVENTIL/VENTOLIN HFA) 90 mcg/actuation inhaler; Inhale 1-2 puffs into the lungs every 6 (six) hours as needed for Wheezing or Shortness of Breath. Rescue  Dispense: 20.1 g; Refill: 1  -     benzonatate (TESSALON) 100 MG capsule; Take 1 capsule (100 mg total) by mouth 3 (three) times daily as needed for Cough.  Dispense: 30 capsule; Refill: 0  -     promethazine-dextromethorphan (PROMETHAZINE-DM) 6.25-15 mg/5 mL Syrp; Take 5 mLs by mouth every 6 (six) hours as needed (cough).  Dispense: 180 mL; Refill: 0  -     doxycycline (MONODOX) 100 MG capsule; Take 1 capsule (100 mg total) by mouth 2 (two) times daily. for 7 days  Dispense: 14 capsule; Refill: 0    2. Paroxysmal atrial fibrillation  Assessment & Plan:  Rate controlled today in clinic. Recent ER visit due to afib with RVR. Continue amiodarone, Eliquis, and " "atenolol. Keep scheduled appointment with cardiac electrophysiology       3. Essential hypertension  Assessment & Plan:  Uncontrolled. Previously well controlled. Likely secondary to acute URI and OTC cough suppressant use. Advised to avoid OTC cough suppressants. Avoid IM and oral steroids due to elevated BP. Monitor blood pressure at home and RTC if does not improve. Keep scheduled routine follow up.      4. Type 2 diabetes mellitus with other specified complication, without long-term current use of insulin  Assessment & Plan:  Lab Results   Component Value Date    HGBA1C 5.0 11/17/2023     Well controlled. Continue metformin.      5. Dyspnea, unspecified type  -     albuterol (PROVENTIL/VENTOLIN HFA) 90 mcg/actuation inhaler; Inhale 1-2 puffs into the lungs every 6 (six) hours as needed for Wheezing or Shortness of Breath. Rescue  Dispense: 20.1 g; Refill: 1            JAY JAY Yanez      Portions of this note may have been created with voice recognition software. Occasional "wrong-word" or "sound-a-like" substitutions may have occurred due to the inherent limitations of voice recognition software. Please, read the note carefully and recognize, using context, where substitutions have occurred.     "

## 2024-03-27 NOTE — ASSESSMENT & PLAN NOTE
Uncontrolled. Previously well controlled. Likely secondary to acute URI and OTC cough suppressant use. Advised to avoid OTC cough suppressants. Avoid IM and oral steroids due to elevated BP. Monitor blood pressure at home and RTC if does not improve. Keep scheduled routine follow up.

## 2024-03-27 NOTE — ASSESSMENT & PLAN NOTE
Rate controlled today in clinic. Recent ER visit due to afib with RVR. Continue amiodarone, Eliquis, and atenolol. Keep scheduled appointment with cardiac electrophysiology

## 2024-04-10 ENCOUNTER — OFFICE VISIT (OUTPATIENT)
Dept: CARDIOLOGY | Facility: CLINIC | Age: 79
End: 2024-04-10
Payer: MEDICARE

## 2024-04-10 VITALS
WEIGHT: 147.5 LBS | HEIGHT: 58 IN | HEART RATE: 61 BPM | RESPIRATION RATE: 16 BRPM | DIASTOLIC BLOOD PRESSURE: 68 MMHG | BODY MASS INDEX: 30.96 KG/M2 | OXYGEN SATURATION: 97 % | SYSTOLIC BLOOD PRESSURE: 138 MMHG

## 2024-04-10 DIAGNOSIS — I48.0 PAROXYSMAL ATRIAL FIBRILLATION: ICD-10-CM

## 2024-04-10 PROCEDURE — 99205 OFFICE O/P NEW HI 60 MIN: CPT | Mod: S$GLB,,, | Performed by: INTERNAL MEDICINE

## 2024-04-10 PROCEDURE — 99999 PR PBB SHADOW E&M-EST. PATIENT-LVL V: CPT | Mod: PBBFAC,,, | Performed by: INTERNAL MEDICINE

## 2024-04-10 PROCEDURE — 3078F DIAST BP <80 MM HG: CPT | Mod: CPTII,S$GLB,, | Performed by: INTERNAL MEDICINE

## 2024-04-10 PROCEDURE — 1159F MED LIST DOCD IN RCRD: CPT | Mod: CPTII,S$GLB,, | Performed by: INTERNAL MEDICINE

## 2024-04-10 PROCEDURE — 3288F FALL RISK ASSESSMENT DOCD: CPT | Mod: CPTII,S$GLB,, | Performed by: INTERNAL MEDICINE

## 2024-04-10 PROCEDURE — 3075F SYST BP GE 130 - 139MM HG: CPT | Mod: CPTII,S$GLB,, | Performed by: INTERNAL MEDICINE

## 2024-04-10 PROCEDURE — 1101F PT FALLS ASSESS-DOCD LE1/YR: CPT | Mod: CPTII,S$GLB,, | Performed by: INTERNAL MEDICINE

## 2024-04-10 NOTE — PROGRESS NOTES
Subjective   Patient ID:  Guillermina Person is a 78 y.o. female who presents for evaluation of Atrial Fibrillation      78 yoF here for arhythmia management. She has history of AFL, s/p AFL ablation . She presented to the ER with AF/RVR 3/14/24. She converted on her own and was on eliquis. She has baseline LBBB. Amiodarone was started  and has been limited to 100 mg qd. She has palpitations at night and attributes these to AF. She takes extra atenolol for these events.     Echo :  1. Normal left ventricular cavity size. Normal left ventricular systolic function. LVEF   55 - 65%. Moderate (Grade II) diastolic dysfunction (pseudonormal filling).   2. Normal right ventricular size. Normal right ventricular systolic function.   3. Mild mitral valve regurgitation.   4. Mild tricuspid valve regurgitation.   5. The ascending aorta is normal in size.     Past Medical History:  No date: Atrial flutter  No date: Brain tumor (benign)  No date: Cancer  No date: Coronary artery disease  No date: Diabetes mellitus  No date: High cholesterol  No date: Hypertension    Past Surgical History:  No date: ABDOMINAL SURGERY  No date: APPENDECTOMY  No date: BRAIN SURGERY  No date:  SECTION  No date: HYSTERECTOMY  No date: OOPHORECTOMY  No date: TONSILLECTOMY    Social History    Socioeconomic History      Marital status:     Tobacco Use      Smoking status: Never    Substance and Sexual Activity      Alcohol use: No      Drug use: No    No family history on file.    Current Outpatient Medications:  albuterol (PROVENTIL/VENTOLIN HFA) 90 mcg/actuation inhaler, Inhale 1-2 puffs into the lungs every 6 (six) hours as needed for Wheezing or Shortness of Breath. Rescue, Disp: 20.1 g, Rfl: 1  amiodarone (PACERONE) 100 MG Tab, Take 1 tablet (100 mg total) by mouth once daily., Disp: 90 tablet, Rfl: 1  apixaban (ELIQUIS) 5 mg Tab, Take 1 tablet (5 mg total) by mouth 2 (two) times daily., Disp: 180 tablet, Rfl: 1  atenoloL  (TENORMIN) 25 MG tablet, Take 1 tablet (25 mg total) by mouth once daily., Disp: 90 tablet, Rfl: 1  blood sugar diagnostic Strp, Test one time daily, Disp: , Rfl:   blood-glucose meter kit, USE AS DIRECTED, Disp: , Rfl:   fish-flx-prm-blkbor-om 3,6,9 5 400-400-200 mg Cap, Take 1 tablet by mouth once daily., Disp: , Rfl:   furosemide (LASIX) 40 MG tablet, Take 1 tablet (40 mg total) by mouth daily as needed., Disp: 90 tablet, Rfl: 1  lancets 33 gauge Misc, Test one time daily, Disp: , Rfl:   lisinopriL-hydrochlorothiazide (PRINZIDE,ZESTORETIC) 20-12.5 mg per tablet, Take 2 tablets by mouth once daily., Disp: 180 tablet, Rfl: 1  loratadine (CLARITIN REDITABS) 10 mg dissolvable tablet, Take 1 tablet by mouth daily as needed., Disp: , Rfl:   metFORMIN (GLUCOPHAGE) 500 MG tablet, Take 1 tablet (500 mg total) by mouth once daily., Disp: 90 tablet, Rfl: 1  pulse oximeter (PULSE OXIMETER) device, Use twice daily at 8 AM and 3 PM and record the value in MyChart as directed., Disp: 1 each, Rfl: 0  simvastatin (ZOCOR) 20 MG tablet, Take 1 tablet (20 mg total) by mouth every evening., Disp: 90 tablet, Rfl: 1    No current facility-administered medications for this visit.          Review of Systems   Constitutional: Negative.   HENT: Negative.     Eyes: Negative.    Cardiovascular:  Positive for irregular heartbeat and palpitations. Negative for chest pain, dyspnea on exertion, leg swelling, near-syncope and syncope.   Respiratory: Negative.  Negative for shortness of breath.    Endocrine: Negative.    Hematologic/Lymphatic: Negative.    Skin: Negative.    Musculoskeletal: Negative.    Gastrointestinal: Negative.    Genitourinary: Negative.    Neurological: Negative.  Negative for dizziness and light-headedness.   Psychiatric/Behavioral: Negative.     Allergic/Immunologic: Negative.           Objective     Physical Exam  Vitals reviewed.   Constitutional:       General: She is not in acute distress.     Appearance: She is  well-developed.   HENT:      Head: Normocephalic and atraumatic.   Eyes:      Pupils: Pupils are equal, round, and reactive to light.   Neck:      Thyroid: No thyromegaly.      Vascular: No JVD.   Cardiovascular:      Rate and Rhythm: Normal rate and regular rhythm.      Chest Wall: PMI is not displaced.      Heart sounds: Normal heart sounds, S1 normal and S2 normal. No murmur heard.     No friction rub. No gallop.   Pulmonary:      Effort: Pulmonary effort is normal. No respiratory distress.      Breath sounds: Normal breath sounds. No wheezing or rales.   Abdominal:      General: Bowel sounds are normal. There is no distension.      Palpations: Abdomen is soft.      Tenderness: There is no abdominal tenderness. There is no guarding or rebound.   Musculoskeletal:         General: No tenderness. Normal range of motion.      Cervical back: Normal range of motion.   Skin:     General: Skin is warm and dry.      Findings: No erythema or rash.   Neurological:      Mental Status: She is alert and oriented to person, place, and time.      Cranial Nerves: No cranial nerve deficit.   Psychiatric:         Behavior: Behavior normal.         Thought Content: Thought content normal.         Judgment: Judgment normal.          Assessment and Plan     1. Paroxysmal atrial fibrillation        Plan:         78 yoF pAF here for arrhythmia management. She had symptomatic breakthrough of pAF on amiodarone at least once. She has nightly palpitations, which is not consistent with AF. Will get monitor to assess for symptom/rhythm correlation. RTC 6m

## 2024-04-12 ENCOUNTER — TELEPHONE (OUTPATIENT)
Dept: INTERNAL MEDICINE | Facility: CLINIC | Age: 79
End: 2024-04-12
Payer: MEDICARE

## 2024-04-12 ENCOUNTER — HOSPITAL ENCOUNTER (EMERGENCY)
Facility: HOSPITAL | Age: 79
Discharge: HOME | End: 2024-04-12
Attending: EMERGENCY MEDICINE
Payer: MEDICARE

## 2024-04-12 VITALS
OXYGEN SATURATION: 96 % | WEIGHT: 145.5 LBS | RESPIRATION RATE: 19 BRPM | DIASTOLIC BLOOD PRESSURE: 58 MMHG | TEMPERATURE: 98 F | HEART RATE: 62 BPM | SYSTOLIC BLOOD PRESSURE: 126 MMHG | BODY MASS INDEX: 30.41 KG/M2

## 2024-04-12 DIAGNOSIS — I48.91 ATRIAL FIBRILLATION WITH RAPID VENTRICULAR RESPONSE: ICD-10-CM

## 2024-04-12 DIAGNOSIS — M25.561 RIGHT KNEE PAIN: ICD-10-CM

## 2024-04-12 DIAGNOSIS — R05.9 COUGH: ICD-10-CM

## 2024-04-12 DIAGNOSIS — M25.461 EFFUSION OF RIGHT KNEE: ICD-10-CM

## 2024-04-12 DIAGNOSIS — E87.6 HYPOKALEMIA: Primary | ICD-10-CM

## 2024-04-12 LAB
ALBUMIN SERPL BCP-MCNC: 2.7 G/DL (ref 3.5–5.2)
ALP SERPL-CCNC: 68 U/L (ref 55–135)
ALT SERPL W/O P-5'-P-CCNC: 10 U/L (ref 10–44)
ANION GAP SERPL CALC-SCNC: 12 MMOL/L (ref 8–16)
ANION GAP SERPL CALC-SCNC: 15 MMOL/L (ref 8–16)
AST SERPL-CCNC: 14 U/L (ref 10–40)
BASOPHILS # BLD AUTO: 0.01 K/UL (ref 0–0.2)
BASOPHILS NFR BLD: 0.2 % (ref 0–1.9)
BILIRUB SERPL-MCNC: 0.5 MG/DL (ref 0.1–1)
BNP SERPL-MCNC: 398 PG/ML (ref 0–99)
BUN SERPL-MCNC: 13 MG/DL (ref 8–23)
BUN SERPL-MCNC: 13 MG/DL (ref 8–23)
CALCIUM SERPL-MCNC: 8.7 MG/DL (ref 8.7–10.5)
CALCIUM SERPL-MCNC: 9.4 MG/DL (ref 8.7–10.5)
CHLORIDE SERPL-SCNC: 100 MMOL/L (ref 95–110)
CHLORIDE SERPL-SCNC: 101 MMOL/L (ref 95–110)
CO2 SERPL-SCNC: 30 MMOL/L (ref 23–29)
CO2 SERPL-SCNC: 30 MMOL/L (ref 23–29)
CREAT SERPL-MCNC: 0.7 MG/DL (ref 0.5–1.4)
CREAT SERPL-MCNC: 0.8 MG/DL (ref 0.5–1.4)
DIFFERENTIAL METHOD BLD: ABNORMAL
EOSINOPHIL # BLD AUTO: 0.1 K/UL (ref 0–0.5)
EOSINOPHIL NFR BLD: 1.2 % (ref 0–8)
ERYTHROCYTE [DISTWIDTH] IN BLOOD BY AUTOMATED COUNT: 13 % (ref 11.5–14.5)
EST. GFR  (NO RACE VARIABLE): >60 ML/MIN/1.73 M^2
EST. GFR  (NO RACE VARIABLE): >60 ML/MIN/1.73 M^2
GLUCOSE SERPL-MCNC: 107 MG/DL (ref 70–110)
GLUCOSE SERPL-MCNC: 108 MG/DL (ref 70–110)
HCT VFR BLD AUTO: 35.9 % (ref 37–48.5)
HGB BLD-MCNC: 11.9 G/DL (ref 12–16)
IMM GRANULOCYTES # BLD AUTO: 0.03 K/UL (ref 0–0.04)
IMM GRANULOCYTES NFR BLD AUTO: 0.5 % (ref 0–0.5)
LYMPHOCYTES # BLD AUTO: 1.1 K/UL (ref 1–4.8)
LYMPHOCYTES NFR BLD: 18.6 % (ref 18–48)
MAGNESIUM SERPL-MCNC: 1.6 MG/DL (ref 1.6–2.6)
MCH RBC QN AUTO: 28.1 PG (ref 27–31)
MCHC RBC AUTO-ENTMCNC: 33.1 G/DL (ref 32–36)
MCV RBC AUTO: 85 FL (ref 82–98)
MONOCYTES # BLD AUTO: 0.5 K/UL (ref 0.3–1)
MONOCYTES NFR BLD: 8.6 % (ref 4–15)
NEUTROPHILS # BLD AUTO: 4.3 K/UL (ref 1.8–7.7)
NEUTROPHILS NFR BLD: 70.9 % (ref 38–73)
NRBC BLD-RTO: 0 /100 WBC
OHS QRS DURATION: 160 MS
OHS QTC CALCULATION: 616 MS
PLATELET # BLD AUTO: 300 K/UL (ref 150–450)
PMV BLD AUTO: 9.8 FL (ref 9.2–12.9)
POTASSIUM SERPL-SCNC: 2.6 MMOL/L (ref 3.5–5.1)
POTASSIUM SERPL-SCNC: 3.1 MMOL/L (ref 3.5–5.1)
PROT SERPL-MCNC: 6.5 G/DL (ref 6–8.4)
RBC # BLD AUTO: 4.23 M/UL (ref 4–5.4)
SODIUM SERPL-SCNC: 143 MMOL/L (ref 136–145)
SODIUM SERPL-SCNC: 145 MMOL/L (ref 136–145)
TROPONIN I SERPL DL<=0.01 NG/ML-MCNC: 0.02 NG/ML (ref 0–0.03)
TROPONIN I SERPL DL<=0.01 NG/ML-MCNC: 0.03 NG/ML (ref 0–0.03)
WBC # BLD AUTO: 6.08 K/UL (ref 3.9–12.7)

## 2024-04-12 PROCEDURE — 85025 COMPLETE CBC W/AUTO DIFF WBC: CPT | Mod: ER | Performed by: EMERGENCY MEDICINE

## 2024-04-12 PROCEDURE — 93005 ELECTROCARDIOGRAM TRACING: CPT | Mod: ER

## 2024-04-12 PROCEDURE — 63600175 PHARM REV CODE 636 W HCPCS: Mod: ER | Performed by: EMERGENCY MEDICINE

## 2024-04-12 PROCEDURE — 84484 ASSAY OF TROPONIN QUANT: CPT | Mod: ER | Performed by: EMERGENCY MEDICINE

## 2024-04-12 PROCEDURE — 99291 CRITICAL CARE FIRST HOUR: CPT | Mod: ER

## 2024-04-12 PROCEDURE — 96365 THER/PROPH/DIAG IV INF INIT: CPT | Mod: ER

## 2024-04-12 PROCEDURE — 96367 TX/PROPH/DG ADDL SEQ IV INF: CPT | Mod: ER

## 2024-04-12 PROCEDURE — 80048 BASIC METABOLIC PNL TOTAL CA: CPT | Mod: ER,XB | Performed by: EMERGENCY MEDICINE

## 2024-04-12 PROCEDURE — 93010 ELECTROCARDIOGRAM REPORT: CPT | Mod: ,,, | Performed by: INTERNAL MEDICINE

## 2024-04-12 PROCEDURE — 96366 THER/PROPH/DIAG IV INF ADDON: CPT | Mod: ER

## 2024-04-12 PROCEDURE — 83880 ASSAY OF NATRIURETIC PEPTIDE: CPT | Mod: ER | Performed by: EMERGENCY MEDICINE

## 2024-04-12 PROCEDURE — 80053 COMPREHEN METABOLIC PANEL: CPT | Mod: ER | Performed by: EMERGENCY MEDICINE

## 2024-04-12 PROCEDURE — 25000003 PHARM REV CODE 250: Mod: ER | Performed by: EMERGENCY MEDICINE

## 2024-04-12 PROCEDURE — 96376 TX/PRO/DX INJ SAME DRUG ADON: CPT | Mod: ER

## 2024-04-12 PROCEDURE — 96375 TX/PRO/DX INJ NEW DRUG ADDON: CPT | Mod: ER

## 2024-04-12 PROCEDURE — 83735 ASSAY OF MAGNESIUM: CPT | Mod: ER | Performed by: EMERGENCY MEDICINE

## 2024-04-12 RX ORDER — METOPROLOL TARTRATE 1 MG/ML
5 INJECTION, SOLUTION INTRAVENOUS EVERY 5 MIN PRN
Status: COMPLETED | OUTPATIENT
Start: 2024-04-12 | End: 2024-04-12

## 2024-04-12 RX ORDER — POTASSIUM CHLORIDE 20 MEQ/1
20 TABLET, EXTENDED RELEASE ORAL 2 TIMES DAILY
Qty: 6 TABLET | Refills: 0 | Status: SHIPPED | OUTPATIENT
Start: 2024-04-12 | End: 2024-04-15

## 2024-04-12 RX ORDER — TRAMADOL HYDROCHLORIDE 50 MG/1
50 TABLET ORAL
Status: COMPLETED | OUTPATIENT
Start: 2024-04-12 | End: 2024-04-12

## 2024-04-12 RX ORDER — POTASSIUM CHLORIDE 20 MEQ/1
40 TABLET, EXTENDED RELEASE ORAL
Status: COMPLETED | OUTPATIENT
Start: 2024-04-12 | End: 2024-04-12

## 2024-04-12 RX ORDER — TRAMADOL HYDROCHLORIDE 50 MG/1
50 TABLET ORAL EVERY 12 HOURS PRN
Qty: 20 TABLET | Refills: 0 | Status: SHIPPED | OUTPATIENT
Start: 2024-04-12 | End: 2024-05-25 | Stop reason: CLARIF

## 2024-04-12 RX ORDER — POTASSIUM CHLORIDE 7.45 MG/ML
10 INJECTION INTRAVENOUS
Status: COMPLETED | OUTPATIENT
Start: 2024-04-12 | End: 2024-04-12

## 2024-04-12 RX ORDER — DICLOFENAC SODIUM 10 MG/G
2 GEL TOPICAL 4 TIMES DAILY PRN
Qty: 350 G | Refills: 1 | Status: SHIPPED | OUTPATIENT
Start: 2024-04-12 | End: 2024-05-25 | Stop reason: CLARIF

## 2024-04-12 RX ORDER — MAGNESIUM SULFATE HEPTAHYDRATE 40 MG/ML
2 INJECTION, SOLUTION INTRAVENOUS
Status: COMPLETED | OUTPATIENT
Start: 2024-04-12 | End: 2024-04-12

## 2024-04-12 RX ADMIN — METOROPROLOL TARTRATE 5 MG: 5 INJECTION, SOLUTION INTRAVENOUS at 08:04

## 2024-04-12 RX ADMIN — POTASSIUM CHLORIDE 40 MEQ: 1500 TABLET, EXTENDED RELEASE ORAL at 10:04

## 2024-04-12 RX ADMIN — MAGNESIUM SULFATE HEPTAHYDRATE 2 G: 40 INJECTION, SOLUTION INTRAVENOUS at 09:04

## 2024-04-12 RX ADMIN — POTASSIUM CHLORIDE 10 MEQ: 7.46 INJECTION, SOLUTION INTRAVENOUS at 10:04

## 2024-04-12 RX ADMIN — METOROPROLOL TARTRATE 5 MG: 5 INJECTION, SOLUTION INTRAVENOUS at 07:04

## 2024-04-12 RX ADMIN — TRAMADOL HYDROCHLORIDE 50 MG: 50 TABLET, COATED ORAL at 07:04

## 2024-04-12 NOTE — ED PROVIDER NOTES
Emergency Medicine Provider Note - 2024       History     Chief Complaint   Patient presents with    Knee Pain     Eduardo knee pain over the weekend, left resolved, right knee pain continues. Denies injury or fall.       Allergies:  Review of patient's allergies indicates:   Allergen Reactions    Augmentin [amoxicillin-pot clavulanate]      Fixed drug reaction      Amoxicillin Itching, Rash and Other (See Comments)     Yeast infection        History of Present Illness   HPI    2024, 7:17 AM  The history is provided by the patient    Guillermina Person is a 78 y.o. female presenting to the ED for knee pain.  Patient reports that her knee started hurting over the weekend-both knees were swollen.  She ice them.  The left knee resolved.  But the right knee continues to be swollen.  The pain is located at the right knee.  In triage, patient was noted to have a rapid heart rate.  Patient does have known history of atrial fibrillation with rapid ventricular rate.  On Eliquis.  Patient notes that she felt slightly lightheaded or dizzy.  She denies any palpitations, chest pain, chest pressure, shortness of breath, difficulty breathing.  She reports that she had the flu 2-3 weeks ago.  She notes that she has been having a persistent cough since then.  It is white.  It is not associated with any fever, chills, body aches.  Patient does note that she is taken her fluid pill twice this week.  Patient denies any calf pain, calf tenderness.      Arrival mode: Private Vehicle     PCP: No, Primary Doctor     Past Medical History:  Past Medical History:   Diagnosis Date    Atrial flutter     Brain tumor (benign)     Cancer     Coronary artery disease     Diabetes mellitus     High cholesterol     Hypertension        Past Surgical History:  Past Surgical History:   Procedure Laterality Date    ABDOMINAL SURGERY      APPENDECTOMY      BRAIN SURGERY       SECTION      HYSTERECTOMY      OOPHORECTOMY      TONSILLECTOMY            Family History:  History reviewed. No pertinent family history.    Social History:  Social History     Tobacco Use    Smoking status: Never    Smokeless tobacco: Not on file   Substance and Sexual Activity    Alcohol use: No    Drug use: No    Sexual activity: Not on file        Review of Systems   Review of Systems   Constitutional:  Negative for fever.   HENT:  Negative for sore throat.    Respiratory:  Negative for shortness of breath.    Cardiovascular:  Negative for chest pain.   Gastrointestinal:  Negative for nausea.   Genitourinary:  Negative for dysuria.   Musculoskeletal:  Positive for arthralgias (Right knee pain). Negative for back pain.   Skin:  Negative for rash.   Neurological:  Negative for weakness.   Hematological:  Does not bruise/bleed easily.        Physical Exam     Initial Vitals [04/12/24 0708]   BP Pulse Resp Temp SpO2   99/61 86 16 98 °F (36.7 °C) 97 %      MAP       --          Physical Exam    Nursing Notes and Vital Signs Reviewed.  Constitutional: Patient is in no apparent distress. Well-developed and well-nourished.  Head: Atraumatic. Normocephalic.  Eyes: PERRL. EOM intact. Conjunctivae are not pale. No scleral icterus.  ENT: Mucous membranes are moist. Oropharynx is clear and symmetric.    Neck: Supple. Full ROM. No lymphadenopathy.  Cardiovascular:  Irregular irregular. No murmurs, rubs, or gallops. Distal pulses are 2+ and symmetric.  Pulmonary/Chest: No respiratory distress. Clear to auscultation bilaterally. No wheezing or rales.  Abdominal: Soft and non-distended.  There is no tenderness.  No rebound, guarding, or rigidity. Good bowel sounds.  Genitourinary: No CVA tenderness  Musculoskeletal: Moves all extremities. No obvious deformities. No edema. No calf tenderness.  The right knee is swollen.  No warmth or erythema.  No calf pain, or calf tenderness.  Left knee no swelling noted.  Skin: Warm and dry.  Neurological:  Alert, awake, and appropriate.  Normal speech.  No  acute focal neurological deficits are appreciated.  Psychiatric: Normal affect. Good eye contact. Appropriate in content.     ED Course   ED Procedures:  Critical Care    Date/Time: 4/12/2024 7:17 AM    Performed by: Vee Llanos DO  Authorized by: Vee Llanos DO  Direct patient critical care time: 30 minutes  Ordering / reviewing critical care time: 5 minutes  Documentation critical care time: 10 minutes  Total critical care time (exclusive of procedural time) : 45 minutes  Critical care time was exclusive of separately billable procedures and treating other patients.  Critical care was necessary to treat or prevent imminent or life-threatening deterioration of the following conditions: cardiac failure.  Critical care was time spent personally by me on the following activities: blood draw for specimens, interpretation of cardiac output measurements, evaluation of patient's response to treatment, examination of patient, obtaining history from patient or surrogate, ordering and performing treatments and interventions, ordering and review of laboratory studies, ordering and review of radiographic studies, pulse oximetry, re-evaluation of patient's condition, review of old charts and vascular access procedures.          ED Vital Signs:  Vitals:    04/12/24 0956 04/12/24 1033 04/12/24 1036 04/12/24 1042   BP:  136/79     Pulse: 105 103 97 99   Resp: 20      Temp:       TempSrc:       SpO2: 98% 98% 97% 97%   Weight:        04/12/24 1047 04/12/24 1142 04/12/24 1146 04/12/24 1203   BP: 102/62  (!) 142/72 (!) 158/67   Pulse: 98 62 63 64   Resp: (!) 24      Temp:       TempSrc:       SpO2: 96%  96% 98%   Weight:        04/12/24 1224 04/12/24 1232 04/12/24 1246 04/12/24 1252   BP:  (!) 117/56 (!) 107/57    Pulse: 62 61 61 61   Resp: 16 19     Temp:       TempSrc:       SpO2: 95%  96% 96%   Weight:        04/12/24 1317 04/12/24 1332 04/12/24 1346   BP: (!) 107/53 (!) 106/54 (!) 126/58   Pulse: 63 63 62   Resp:       Temp:      TempSrc:      SpO2: 96%     Weight:          Abnormal Lab Results:  Labs Reviewed   CBC W/ AUTO DIFFERENTIAL - Abnormal; Notable for the following components:       Result Value    Hemoglobin 11.9 (*)     Hematocrit 35.9 (*)     All other components within normal limits   COMPREHENSIVE METABOLIC PANEL - Abnormal; Notable for the following components:    Potassium 2.6 (*)     CO2 30 (*)     Albumin 2.7 (*)     All other components within normal limits    Narrative:      Potassium  critical result(s) called and verbal readback obtained   from Мария Khan by NRR 04/12/2024 08:21   B-TYPE NATRIURETIC PEPTIDE - Abnormal; Notable for the following components:     (*)     All other components within normal limits   BASIC METABOLIC PANEL - Abnormal; Notable for the following components:    Potassium 3.1 (*)     CO2 30 (*)     All other components within normal limits   TROPONIN I   MAGNESIUM   MAGNESIUM   TROPONIN I        All Lab Results:  Results for orders placed or performed during the hospital encounter of 04/12/24   CBC auto differential   Result Value Ref Range    WBC 6.08 3.90 - 12.70 K/uL    RBC 4.23 4.00 - 5.40 M/uL    Hemoglobin 11.9 (L) 12.0 - 16.0 g/dL    Hematocrit 35.9 (L) 37.0 - 48.5 %    MCV 85 82 - 98 fL    MCH 28.1 27.0 - 31.0 pg    MCHC 33.1 32.0 - 36.0 g/dL    RDW 13.0 11.5 - 14.5 %    Platelets 300 150 - 450 K/uL    MPV 9.8 9.2 - 12.9 fL    Immature Granulocytes 0.5 0.0 - 0.5 %    Gran # (ANC) 4.3 1.8 - 7.7 K/uL    Immature Grans (Abs) 0.03 0.00 - 0.04 K/uL    Lymph # 1.1 1.0 - 4.8 K/uL    Mono # 0.5 0.3 - 1.0 K/uL    Eos # 0.1 0.0 - 0.5 K/uL    Baso # 0.01 0.00 - 0.20 K/uL    nRBC 0 0 /100 WBC    Gran % 70.9 38.0 - 73.0 %    Lymph % 18.6 18.0 - 48.0 %    Mono % 8.6 4.0 - 15.0 %    Eosinophil % 1.2 0.0 - 8.0 %    Basophil % 0.2 0.0 - 1.9 %    Differential Method Automated    Comprehensive metabolic panel   Result Value Ref Range    Sodium 145 136 - 145 mmol/L    Potassium 2.6 (LL)  3.5 - 5.1 mmol/L    Chloride 100 95 - 110 mmol/L    CO2 30 (H) 23 - 29 mmol/L    Glucose 108 70 - 110 mg/dL    BUN 13 8 - 23 mg/dL    Creatinine 0.8 0.5 - 1.4 mg/dL    Calcium 9.4 8.7 - 10.5 mg/dL    Total Protein 6.5 6.0 - 8.4 g/dL    Albumin 2.7 (L) 3.5 - 5.2 g/dL    Total Bilirubin 0.5 0.1 - 1.0 mg/dL    Alkaline Phosphatase 68 55 - 135 U/L    AST 14 10 - 40 U/L    ALT 10 10 - 44 U/L    eGFR >60.0 >60 mL/min/1.73 m^2    Anion Gap 15 8 - 16 mmol/L   Troponin I #1   Result Value Ref Range    Troponin I 0.022 0.000 - 0.026 ng/mL   BNP   Result Value Ref Range     (H) 0 - 99 pg/mL   Magnesium   Result Value Ref Range    Magnesium 1.6 1.6 - 2.6 mg/dL   Troponin I #2   Result Value Ref Range    Troponin I 0.025 0.000 - 0.026 ng/mL   Basic metabolic panel   Result Value Ref Range    Sodium 143 136 - 145 mmol/L    Potassium 3.1 (L) 3.5 - 5.1 mmol/L    Chloride 101 95 - 110 mmol/L    CO2 30 (H) 23 - 29 mmol/L    Glucose 107 70 - 110 mg/dL    BUN 13 8 - 23 mg/dL    Creatinine 0.7 0.5 - 1.4 mg/dL    Calcium 8.7 8.7 - 10.5 mg/dL    Anion Gap 12 8 - 16 mmol/L    eGFR >60.0 >60 mL/min/1.73 m^2         The EKG was ordered, reviewed, and independently interpreted by the ED provider:      ECG Results              EKG 12-lead (Preliminary result)  Result time 04/12/24 08:23:49      Wet Read by Vee Llanos DO (04/12/24 08:23:49, [facility] Emergency Dept, Emergency Medicine)    Atrial fibrillation.  Rate of 118.  Rapid ventricular response.  Left bundle-branch block.  No STEMI                                    Imaging Results:  Imaging Results              X-Ray Knee Complete 4 Or More Views Right (Final result)  Result time 04/12/24 08:00:11      Final result by Bruno Nagel MD (04/12/24 08:00:11)                   Impression:      Large joint effusion.  Moderate degenerative change.      Electronically signed by: Bruno Nagel MD  Date:    04/12/2024  Time:    08:00               Narrative:     EXAMINATION:  XR KNEE COMP 4 OR MORE VIEWS RIGHT    CLINICAL HISTORY:  Pain in right knee    COMPARISON:  None    FINDINGS:  Results:  No evidence of acute fracture or dislocation.  Bony mineralization is normal.  Soft tissues are unremarkable. Lateral view of the right knee demonstrates large joint effusion.  Left foot scattered areas bony remodeling femoral metaphysis along the joint margin of unknown clinical significance.  Moderate tricompartment degenerative changes with joint space narrowing, sclerosis and marginal osteophytes.  Chronic injury of the proximal medial collateral ligament with calcification seen.                                       X-Ray Chest AP Portable (Final result)  Result time 04/12/24 07:58:13      Final result by Bruno Nagel MD (04/12/24 07:58:13)                   Impression:      No acute process seen.      Electronically signed by: Bruno Nagel MD  Date:    04/12/2024  Time:    07:58               Narrative:    EXAMINATION:  XR CHEST AP PORTABLE    CLINICAL HISTORY:  Cough, unspecified    FINDINGS:  Single view of the chest.  Comparison 03/14/2024.    Cardiac silhouette is normal.  The lungs demonstrate no evidence of active disease.  No evidence of pleural effusion or pneumothorax.  Bones appear intact.  Moderate degenerative changes and moderate atherosclerotic disease.                                       Type of Interpretation: ED Physician (Independently Interpreted).  Interpretation: Chest x-ray:  No pneumothorax.  Normal mediastinum.  No pleural effusion  Right knee:  Degenerative joint disease noted.  Joint effusion present.        The Emergency Provider reviewed the vital signs and test results, which are outlined above.     ED Discussion   ED Medication(s):  Medications   metoprolol injection 5 mg (5 mg Intravenous Given 4/12/24 0858)   traMADoL tablet 50 mg (50 mg Oral Given 4/12/24 0254)   magnesium sulfate 2g in water 50mL IVPB (premix) (0 g Intravenous Stopped  4/12/24 1150)   potassium chloride SA CR tablet 40 mEq (40 mEq Oral Given 4/12/24 1057)   potassium chloride 10 mEq in 100 mL IVPB (0 mEq Intravenous Stopped 4/12/24 1229)       ED Course as of 04/12/24 1356   Fri Apr 12, 2024   0822 Potassium(!!): 2.6  Potassium low.  Will add magnesium.  Patient may require magnesium infusion prior to administering potassium.  Will continue to monitor. [LB]   0830 Large joint effusion is noted.  However patient is on oral anticoagulant. [LB]   1040 Discussed pain medications.  Risk/benefits of ultram.   Discussed POC.  [LB]   1242 Patient converted to normal sinus rhythm. [LB]   1332 Potassium(!): 3.1 [LB]      ED Course User Index  [LB] Vee Llanos,             1:48 PM  Reassessment: Dr. Llanos reassessed the pt.  The pt is resting comfortably and is NAD.  Pt states their sx have improved. Discussed test results, shared treatment plan, specific conditions for return, and the need for f/u.  Answered their questions at this time.  Pt understands and agrees to the plan.  The pt has remained hemodynamically stable through ED course and is stable for discharge.    I discussed with patient and/or family/caretaker that evaluation in the ED does not suggest any emergent or life threatening medical conditions requiring immediate intervention beyond what was provided in the ED, and I believe patient is safe for discharge.  Regardless, an unremarkable evaluation in the ED does not preclude the development or presence of a serious of life threatening condition. As such, patient was instructed to return immediately for any worsening or change in current symptoms.     MIPS Measures     Smoker? No     Hypertension: History of Hypertension: The patient has elevated blood pressure (higher than 120/80) while being treated in the ED but has a history of hypertension.     Medical Decision Making                 Medical Decision Making  Differential diagnosis: Arrhythmia, electrolyte  abnormality, congestive heart failure, pneumonia, joint effusion, arthritis    In triage patient was noted to be in atrial fibrillation with rapid ventricular rate.  Patient placed on monitor.  Patient given 3 divided doses of Lopressor 5 mg.  Patient's potassium was critically low at 2.6.  Mag 1.6.  Patient was given 2 g of magnesium IV piggyback.  Oral replacement was started with potassium, 40 mEq.  Then a 10 mEq K rider.   Patient did convert from atrial fibrillation to normal sinus rhythm.  White cell count normal.  Mild anemia with H/H 11.9/35.9.  Troponin 0.022, repeat 0.25.  .  Chest x-ray clear.  Right knee:  Joint effusion present.  Although patient is on oral anticoagulants which precludes aspiration.  On clinical exam I do not suspect septic joint.  Patient's repeat potassium 3.1.    Patient discharged home      Amount and/or Complexity of Data Reviewed  Labs: ordered. Decision-making details documented in ED Course.  Radiology: ordered and independent interpretation performed. Decision-making details documented in ED Course.  ECG/medicine tests: ordered and independent interpretation performed. Decision-making details documented in ED Course.  Discussion of management or test interpretation with external provider(s): New Prescriptions  DICLOFENAC SODIUM (VOLTAREN ARTHRITIS PAIN) 1 % GEL     Apply 2 g topically 4 (four) times daily as needed (knee pain).  POTASSIUM CHLORIDE SA (K-DUR,KLOR-CON) 20 MEQ TABLET     Take 1 tablet (20 mEq total) by mouth 2 (two) times daily. for 3 days  TRAMADOL (ULTRAM) 50 MG TABLET     Take 1 tablet (50 mg total) by mouth every 12 (twelve) hours as needed for Pain.      Risk  Prescription drug management.        Coding    Prescription Management: I performed a review of the patient's current Rx medication list as input by nursing staff.    Patient's Medications   New Prescriptions    DICLOFENAC SODIUM (VOLTAREN ARTHRITIS PAIN) 1 % GEL    Apply 2 g topically 4 (four)  "times daily as needed (knee pain).    POTASSIUM CHLORIDE SA (K-DUR,KLOR-CON) 20 MEQ TABLET    Take 1 tablet (20 mEq total) by mouth 2 (two) times daily. for 3 days    TRAMADOL (ULTRAM) 50 MG TABLET    Take 1 tablet (50 mg total) by mouth every 12 (twelve) hours as needed for Pain.   Previous Medications    ALBUTEROL (PROVENTIL/VENTOLIN HFA) 90 MCG/ACTUATION INHALER    Inhale 1-2 puffs into the lungs every 6 (six) hours as needed for Wheezing or Shortness of Breath. Rescue    AMIODARONE (PACERONE) 100 MG TAB    Take 1 tablet (100 mg total) by mouth once daily.    APIXABAN (ELIQUIS) 5 MG TAB    Take 1 tablet (5 mg total) by mouth 2 (two) times daily.    ATENOLOL (TENORMIN) 25 MG TABLET    Take 1 tablet (25 mg total) by mouth once daily.    BLOOD SUGAR DIAGNOSTIC STRP    Test one time daily    BLOOD-GLUCOSE METER KIT    USE AS DIRECTED    FISH-FLX-PRM-BLKBOR-OM 3,6,9 5 400-400-200 MG CAP    Take 1 tablet by mouth once daily.    FUROSEMIDE (LASIX) 40 MG TABLET    Take 1 tablet (40 mg total) by mouth daily as needed.    LANCETS 33 GAUGE MISC    Test one time daily    LISINOPRIL-HYDROCHLOROTHIAZIDE (PRINZIDE,ZESTORETIC) 20-12.5 MG PER TABLET    Take 2 tablets by mouth once daily.    LORATADINE (CLARITIN REDITABS) 10 MG DISSOLVABLE TABLET    Take 1 tablet by mouth daily as needed.    METFORMIN (GLUCOPHAGE) 500 MG TABLET    Take 1 tablet (500 mg total) by mouth once daily.    PULSE OXIMETER (PULSE OXIMETER) DEVICE    Use twice daily at 8 AM and 3 PM and record the value in [facility] as directed.    SIMVASTATIN (ZOCOR) 20 MG TABLET    Take 1 tablet (20 mg total) by mouth every evening.   Modified Medications    No medications on file   Discontinued Medications    No medications on file        Discussed case with:N/A      Portions of this note may have been created with voice recognition software. Occasional "wrong-word" or "sound-a-like" substitutions may have occurred due to the inherent limitations of voice recognition " software. Please, read the note carefully and recognize, using context, where substitutions have occurred.          Clinical Impression       ICD-10-CM ICD-9-CM   1. Hypokalemia  E87.6 276.8   2. Atrial fibrillation with rapid ventricular response  I48.91 427.31   3. Right knee pain  M25.561 719.46   4. Cough  R05.9 786.2   5. Effusion of right knee  M25.461 719.06        Disposition        Disposition: Discharge to home  Patient condition: Stable      ED Follow-up      Follow-up Information       Harvinder Silva MD In 2 days.    Specialties: Electrophysiology, Cardiovascular Disease, Cardiology  Why: Return to emergency department for lightheadedness, dizziness, feeling weak, rapid heart rate, difficulty breathing, or other concerns  Contact information:  [address] [facility] [address]  [phone]               Magi Marmolejo FNP-ELODIA In 2 days.    Specialty: Family Medicine  Why: For repeat potassium check.  Contact information:  [address] [address] [address]  [phone]                                      Vee Llanos, DO  04/12/24 8859

## 2024-04-12 NOTE — TELEPHONE ENCOUNTER
Chief Complaint   Patient presents with    UTI     X couple days Poss uti or bladder infection, has been having an urgency, has been taking azo       HISTORY OF PRESENT ILLNESS: Patient is a pleasant 66 y.o. female who presents today due to two days of urinary urgency and frequency. Reports some associated incontinence which is abnormal for her. Denies dysuria, hematuria, fever, flank pain, N/V. Denies a history of pyelonephritis or kidney stones. Admits a history of UTIs, last one was over two years ago.     Patient Active Problem List    Diagnosis Date Noted    Vertigo 06/28/2023    Dysuria 04/24/2023    Allergic conjunctivitis 02/16/2023    Vitamin D deficiency 02/16/2023    Dense breast 10/11/2022    Esophagitis 05/03/2022    Delayed gastric emptying 04/28/2022    Dyspepsia 04/12/2022    History of UTI 04/05/2022    Anxiety 04/05/2022    Macrocytosis 10/22/2021    Insomnia 09/10/2020    Gastroesophageal reflux disease 07/11/2018    Dyslipidemia 07/11/2018       Allergies:Oxycodone, Erythromycin, Buspirone, and Citalopram    Current Outpatient Medications Ordered in Epic   Medication Sig Dispense Refill    RESTASIS 0.05 % ophthalmic emulsion INSTILL 1 DROP INTO BOTH EYES TWICE A DAY AS DIRECTED      nitrofurantoin (MACROBID) 100 MG Cap Take 1 Capsule by mouth 2 times a day for 5 days. 10 Capsule 0    hydrOXYzine HCl (ATARAX) 10 MG Tab Take 1 Tablet by mouth 2 times a day as needed for Anxiety. 60 Tablet 2    rosuvastatin (CRESTOR) 40 MG tablet Take 1 Tablet by mouth every day. 90 Tablet 3    meclizine (ANTIVERT) 25 MG Tab Take 1 Tablet by mouth 3 times a day as needed for Vertigo. 30 Tablet 0    diclofenac sodium (VOLTAREN) 1 % Gel Apply 2 g topically 4 times a day as needed (shoulder pain, knee pain). 150 g 1    zolpidem (AMBIEN) 10 MG Tab Take 10 mg by mouth at bedtime as needed. For up to 30 days      azelastine (OPTIVAR) 0.05 % ophthalmic solution INSTILL 1 DROP INTO BOTH EYES TWICE A DAY      esomeprazole  Spoke with patient's granddaughter initially. I explained to her granddaughter that we received a message from the ER stating we needed to see Ms. Sarmiento next to repeat lab work. I don't think the granddaughter was understanding the purpose of the call. She didn't release we knew about the hospital visit and needed her in office next week. Ms. Sarmiento then got on the phone and said that she would come in the office Tuesday for the repeat lab and hospital f/u   (NEXIUM) 40 MG delayed-release capsule Take 40 mg by mouth every day.      Cholecalciferol (VITAMIN D3) 2000 UNIT Cap Take  by mouth.      calcium citrate (CALCITRATE) 950 MG Tab Take 950 mg by mouth every day.       No current [facility]-ordered facility-administered medications on file.       Past Medical History:   Diagnosis Date    Asthma        Social History     Tobacco Use    Smoking status: Never    Smokeless tobacco: Never   Vaping Use    Vaping Use: Never used   Substance Use Topics    Alcohol use: Yes     Alcohol/week: 0.0 oz     Comment: occ     Drug use: No       Family Status   Relation Name Status    Mo  (Not Specified)    Fa  (Not Specified)     Family History   Problem Relation Age of Onset    No Known Problems Mother     No Known Problems Father        ROS:  Review of Systems   Constitutional: Negative for fever, chills, weight loss and malaise/fatigue.   HENT: Negative for ear pain, nosebleeds, congestion, sore throat and neck pain.    Eyes: Negative for vision changes.   Neuro: Negative for headache, sensory changes, weakness, seizure, LOC.   Cardiovascular: Negative for chest pain, palpitations, orthopnea and leg swelling.   Respiratory: Negative for cough, sputum production, shortness of breath and wheezing.   Gastrointestinal: Positive for lower abdominal pressure. Negative for abdominal pain, nausea, vomiting or diarrhea.   Genitourinary: Positive for incontinence, urgency and frequency. Negative for hematuria, dysuria, or flank pain.   Skin: Negative for rash, diaphoresis.     Exam:  /60   Pulse 77   Temp 36.5 °C (97.7 °F) (Temporal)   Resp 18   Ht 1.524 m (5')   Wt 65.8 kg (145 lb)   SpO2 98%   General: well-nourished, well-developed female in NAD  Head: normocephalic, atraumatic  Eyes: PERRLA, no conjunctival injection, acuity grossly intact, lids normal.  Lymph: no cervical adenopathy. No supraclavicular adenopathy.   Neuro: alert and oriented. Cranial nerves 1-12 grossly intact. No  sensory deficit.   Cardiovascular: regular rate and rhythm. No edema.  Pulmonary: no distress. Chest is symmetrical with respiration, no wheezes, crackles, or rhonchi.   Abdomen: soft, non-tender, no guarding, no hepatosplenomegaly. No CVA tenderness.   Musculoskeletal: no clubbing, appropriate muscle tone, gait is stable.  Skin: warm, dry, intact, no clubbing, no cyanosis, no rashes.   Psych: appropriate mood, affect, judgement.       POC urine positive for blood      Assessment/Plan:  1. Acute urinary tract infection  POCT Urinalysis    nitrofurantoin (MACROBID) 100 MG Cap    URINE CULTURE(NEW)            Antibiotic as directed. Increase fluid intake. Urine sent for culture.   Supportive care, differential diagnoses, and indications for immediate follow-up discussed with patient.   Pathogenesis of diagnosis discussed including typical length and natural progression.   Instructed to return to clinic or nearest emergency department for any change in condition, further concerns, or worsening of symptoms.  Patient states understanding of the plan of care and discharge instructions.  Instructed to make an appointment, for follow up, with her primary care provider regarding hematuria.        Please note that this dictation was created using voice recognition software. I have made every reasonable attempt to correct obvious errors, but I expect that there are errors of grammar and possibly content that I did not discover before finalizing the note.      SENTHIL Mayorga.

## 2024-04-12 NOTE — TELEPHONE ENCOUNTER
----- Message from Vee Llanos DO sent at 4/12/2024  1:52 PM CDT -----  Regarding: ED visit.  Good afternoon.  I just wanted to let you know she came into the emergency department complaining of right knee pain.  She incidentally was found to be in atrial fibrillation with rapid ventricular rate.  Her potassium was noted to be 2.6.  In the emergency room, she was given Lopressor with good control of heart rate.  Potassium was replaced.  Repeat potassium was 3.1.  She converted to normal sinus rhythm.    Ms. Marmolejo, could your follow up with a repeat potassium level next week?    Dr. Silva, I am just including you for continuity of care.     Have a great weekend,    Vee

## 2024-04-15 ENCOUNTER — TELEPHONE (OUTPATIENT)
Dept: INTERNAL MEDICINE | Facility: CLINIC | Age: 79
End: 2024-04-15
Payer: MEDICARE

## 2024-04-15 DIAGNOSIS — E87.6 HYPOKALEMIA: Primary | ICD-10-CM

## 2024-04-15 NOTE — TELEPHONE ENCOUNTER
----- Message from Irene Avendaño sent at 4/15/2024  9:29 AM CDT -----  Ms vázquez came today 04/15/24 , she thought today was her appt , but its tomorrow , she said she is not coming back tomorrow , she feels fine . Just letting yall know

## 2024-04-19 ENCOUNTER — LAB VISIT (OUTPATIENT)
Dept: LAB | Facility: HOSPITAL | Age: 79
End: 2024-04-19
Attending: NURSE PRACTITIONER
Payer: MEDICARE

## 2024-04-19 ENCOUNTER — TELEPHONE (OUTPATIENT)
Dept: INTERNAL MEDICINE | Facility: CLINIC | Age: 79
End: 2024-04-19
Payer: MEDICARE

## 2024-04-19 DIAGNOSIS — E87.6 HYPOKALEMIA: ICD-10-CM

## 2024-04-19 LAB — POTASSIUM SERPL-SCNC: 4 MMOL/L (ref 3.5–5.1)

## 2024-04-19 PROCEDURE — 36415 COLL VENOUS BLD VENIPUNCTURE: CPT | Mod: PO | Performed by: NURSE PRACTITIONER

## 2024-04-19 PROCEDURE — 84132 ASSAY OF SERUM POTASSIUM: CPT | Mod: PO | Performed by: NURSE PRACTITIONER

## 2024-04-19 NOTE — TELEPHONE ENCOUNTER
----- Message from JAY JAY Willingham sent at 4/19/2024  9:36 AM CDT -----  Regarding: RE: Lab Results/Potasssium  The drop in the potassium is potentially from some of her blood pressure medications. If drops again, we will need to start a daily potassium supplement. We will repeat your potassium level at your 5/17 appointment.     JAY JAY Yanez  ----- Message -----  From: Mckenzie Jolley MA  Sent: 4/19/2024   9:24 AM CDT  To: JAY JAY Willingham  Subject: FW: Lab Results/Potasssium                         ----- Message -----  From: Steff London  Sent: 4/19/2024   9:10 AM CDT  To: Magi Marmolejo Staff  Subject: Lab Results/Potasssium                           Hi:    Patient would like to know what she can do other than eating bananas to prevent this from happening in the future.  Please give her a call.    Steff Batres

## 2024-04-19 NOTE — TELEPHONE ENCOUNTER
Spoke with patient and advised her of the instructions and labs. Patient verbalized understanding.

## 2024-04-26 ENCOUNTER — HOSPITAL ENCOUNTER (OUTPATIENT)
Dept: CARDIOLOGY | Facility: HOSPITAL | Age: 79
Discharge: HOME | End: 2024-04-26
Attending: INTERNAL MEDICINE
Payer: MEDICARE

## 2024-04-26 DIAGNOSIS — I48.0 PAROXYSMAL ATRIAL FIBRILLATION: ICD-10-CM

## 2024-04-26 PROCEDURE — 93248 EXT ECG>7D<15D REV&INTERPJ: CPT | Mod: ,,, | Performed by: INTERNAL MEDICINE

## 2024-05-14 LAB
OHS CV HOLTER SINUS AVERAGE HR: 60
OHS CV HOLTER SINUS MAX HR: 81
OHS CV HOLTER SINUS MIN HR: 51

## 2024-05-17 ENCOUNTER — OFFICE VISIT (OUTPATIENT)
Dept: INTERNAL MEDICINE | Facility: CLINIC | Age: 79
End: 2024-05-17
Payer: MEDICARE

## 2024-05-17 ENCOUNTER — LAB VISIT (OUTPATIENT)
Dept: LAB | Facility: HOSPITAL | Age: 79
End: 2024-05-17
Attending: NURSE PRACTITIONER
Payer: MEDICARE

## 2024-05-17 VITALS
BODY MASS INDEX: 30.59 KG/M2 | OXYGEN SATURATION: 97 % | RESPIRATION RATE: 18 BRPM | TEMPERATURE: 97 F | HEIGHT: 58 IN | DIASTOLIC BLOOD PRESSURE: 72 MMHG | WEIGHT: 145.75 LBS | HEART RATE: 59 BPM | SYSTOLIC BLOOD PRESSURE: 118 MMHG

## 2024-05-17 DIAGNOSIS — I10 ESSENTIAL HYPERTENSION: ICD-10-CM

## 2024-05-17 DIAGNOSIS — E11.69 TYPE 2 DIABETES MELLITUS WITH OTHER SPECIFIED COMPLICATION, WITHOUT LONG-TERM CURRENT USE OF INSULIN: ICD-10-CM

## 2024-05-17 DIAGNOSIS — E78.2 MIXED HYPERLIPIDEMIA: ICD-10-CM

## 2024-05-17 DIAGNOSIS — I48.0 PAROXYSMAL ATRIAL FIBRILLATION: Primary | ICD-10-CM

## 2024-05-17 DIAGNOSIS — E66.09 CLASS 1 OBESITY DUE TO EXCESS CALORIES WITH SERIOUS COMORBIDITY AND BODY MASS INDEX (BMI) OF 31.0 TO 31.9 IN ADULT: ICD-10-CM

## 2024-05-17 LAB
ALBUMIN SERPL BCP-MCNC: 3.2 G/DL (ref 3.5–5.2)
ALP SERPL-CCNC: 67 U/L (ref 55–135)
ALT SERPL W/O P-5'-P-CCNC: 12 U/L (ref 10–44)
ANION GAP SERPL CALC-SCNC: 12 MMOL/L (ref 8–16)
AST SERPL-CCNC: 15 U/L (ref 10–40)
BILIRUB SERPL-MCNC: 0.9 MG/DL (ref 0.1–1)
BUN SERPL-MCNC: 17 MG/DL (ref 8–23)
CALCIUM SERPL-MCNC: 8.8 MG/DL (ref 8.7–10.5)
CHLORIDE SERPL-SCNC: 105 MMOL/L (ref 95–110)
CO2 SERPL-SCNC: 26 MMOL/L (ref 23–29)
CREAT SERPL-MCNC: 1.1 MG/DL (ref 0.5–1.4)
EST. GFR  (NO RACE VARIABLE): 50.8 ML/MIN/1.73 M^2
ESTIMATED AVG GLUCOSE: 100 MG/DL (ref 68–131)
GLUCOSE SERPL-MCNC: 87 MG/DL (ref 70–110)
HBA1C MFR BLD: 5.1 % (ref 4–5.6)
POTASSIUM SERPL-SCNC: 4 MMOL/L (ref 3.5–5.1)
PROT SERPL-MCNC: 6.3 G/DL (ref 6–8.4)
SODIUM SERPL-SCNC: 143 MMOL/L (ref 136–145)

## 2024-05-17 PROCEDURE — 3288F FALL RISK ASSESSMENT DOCD: CPT | Mod: CPTII,S$GLB,, | Performed by: NURSE PRACTITIONER

## 2024-05-17 PROCEDURE — 1160F RVW MEDS BY RX/DR IN RCRD: CPT | Mod: CPTII,S$GLB,, | Performed by: NURSE PRACTITIONER

## 2024-05-17 PROCEDURE — 83036 HEMOGLOBIN GLYCOSYLATED A1C: CPT | Performed by: NURSE PRACTITIONER

## 2024-05-17 PROCEDURE — 1101F PT FALLS ASSESS-DOCD LE1/YR: CPT | Mod: CPTII,S$GLB,, | Performed by: NURSE PRACTITIONER

## 2024-05-17 PROCEDURE — 36415 COLL VENOUS BLD VENIPUNCTURE: CPT | Mod: PO | Performed by: NURSE PRACTITIONER

## 2024-05-17 PROCEDURE — 3078F DIAST BP <80 MM HG: CPT | Mod: CPTII,S$GLB,, | Performed by: NURSE PRACTITIONER

## 2024-05-17 PROCEDURE — 99999 PR PBB SHADOW E&M-EST. PATIENT-LVL V: CPT | Mod: PBBFAC,,, | Performed by: NURSE PRACTITIONER

## 2024-05-17 PROCEDURE — 80053 COMPREHEN METABOLIC PANEL: CPT | Mod: PO | Performed by: NURSE PRACTITIONER

## 2024-05-17 PROCEDURE — 1126F AMNT PAIN NOTED NONE PRSNT: CPT | Mod: CPTII,S$GLB,, | Performed by: NURSE PRACTITIONER

## 2024-05-17 PROCEDURE — 3074F SYST BP LT 130 MM HG: CPT | Mod: CPTII,S$GLB,, | Performed by: NURSE PRACTITIONER

## 2024-05-17 PROCEDURE — 99214 OFFICE O/P EST MOD 30 MIN: CPT | Mod: S$GLB,,, | Performed by: NURSE PRACTITIONER

## 2024-05-17 PROCEDURE — 1159F MED LIST DOCD IN RCRD: CPT | Mod: CPTII,S$GLB,, | Performed by: NURSE PRACTITIONER

## 2024-05-17 RX ORDER — AMIODARONE HYDROCHLORIDE 100 MG/1
100 TABLET ORAL DAILY
Qty: 90 TABLET | Refills: 3 | Status: SHIPPED | OUTPATIENT
Start: 2024-05-17 | End: 2024-05-21 | Stop reason: SDUPTHER

## 2024-05-17 RX ORDER — ATENOLOL 25 MG/1
25 TABLET ORAL DAILY
Qty: 90 TABLET | Refills: 3 | Status: SHIPPED | OUTPATIENT
Start: 2024-05-17 | End: 2024-05-21 | Stop reason: SDUPTHER

## 2024-05-17 RX ORDER — LANCETS
EACH MISCELLANEOUS
Qty: 100 EACH | Refills: 11 | Status: SHIPPED | OUTPATIENT
Start: 2024-05-17 | End: 2024-05-21 | Stop reason: SDUPTHER

## 2024-05-17 RX ORDER — INSULIN PUMP SYRINGE, 3 ML
EACH MISCELLANEOUS
Qty: 1 EACH | Refills: 0 | Status: SHIPPED | OUTPATIENT
Start: 2024-05-17 | End: 2024-05-21 | Stop reason: SDUPTHER

## 2024-05-17 NOTE — PROGRESS NOTES
Subjective:       Patient ID: Guillermina Person is a 79 y.o. female.    Chief Complaint: Follow-up and Hypertension    Mrs. Person returns to clinic with her  for routine follow up. Since last visit, she has established with Dr. Silva for two episodes of AF/RVR. She completed her 2 week Holter monitor and mailed it off last week. Overall she is doing well and without complaint today.          Patient Active Problem List   Diagnosis    Essential hypertension    History of uterine cancer    Left bundle branch block    Paroxysmal atrial fibrillation    Primary osteoarthritis of both knees    Type 2 diabetes mellitus with other specified complication    Class 1 obesity due to excess calories with serious comorbidity and body mass index (BMI) of 31.0 to 31.9 in adult    Mixed hyperlipidemia    Osteopenia       No family history on file.  Past Surgical History:   Procedure Laterality Date    ABDOMINAL SURGERY      APPENDECTOMY      BRAIN SURGERY       SECTION      HYSTERECTOMY      OOPHORECTOMY      TONSILLECTOMY           Current Outpatient Medications:     albuterol (PROVENTIL/VENTOLIN HFA) 90 mcg/actuation inhaler, Inhale 1-2 puffs into the lungs every 6 (six) hours as needed for Wheezing or Shortness of Breath. Rescue, Disp: 20.1 g, Rfl: 1    diclofenac sodium (VOLTAREN ARTHRITIS PAIN) 1 % Gel, Apply 2 g topically 4 (four) times daily as needed (knee pain)., Disp: 350 g, Rfl: 1    fish-flx-prm-blkbor-om 3,6,9 5 400-400-200 mg Cap, Take 1 tablet by mouth once daily., Disp: , Rfl:     furosemide (LASIX) 40 MG tablet, Take 1 tablet (40 mg total) by mouth daily as needed., Disp: 90 tablet, Rfl: 1    lisinopriL-hydrochlorothiazide (PRINZIDE,ZESTORETIC) 20-12.5 mg per tablet, Take 2 tablets by mouth once daily., Disp: 180 tablet, Rfl: 1    loratadine (CLARITIN REDITABS) 10 mg dissolvable tablet, Take 1 tablet by mouth daily as needed., Disp: , Rfl:     metFORMIN (GLUCOPHAGE) 500 MG tablet, Take 1 tablet (500  mg total) by mouth once daily., Disp: 90 tablet, Rfl: 1    pulse oximeter (PULSE OXIMETER) device, Use twice daily at 8 AM and 3 PM and record the value in MyChart as directed., Disp: 1 each, Rfl: 0    simvastatin (ZOCOR) 20 MG tablet, Take 1 tablet (20 mg total) by mouth every evening., Disp: 90 tablet, Rfl: 1    traMADoL (ULTRAM) 50 mg tablet, Take 1 tablet (50 mg total) by mouth every 12 (twelve) hours as needed for Pain., Disp: 20 tablet, Rfl: 0    amiodarone (PACERONE) 100 MG Tab, Take 1 tablet (100 mg total) by mouth once daily., Disp: 90 tablet, Rfl: 3    apixaban (ELIQUIS) 5 mg Tab, Take 1 tablet (5 mg total) by mouth 2 (two) times daily., Disp: 180 tablet, Rfl: 3    atenoloL (TENORMIN) 25 MG tablet, Take 1 tablet (25 mg total) by mouth once daily., Disp: 90 tablet, Rfl: 3    blood sugar diagnostic Strp, To check BG one times daily, to use with insurance preferred meter, Disp: 100 each, Rfl: 11    blood-glucose meter kit, To check BG one times daily, to use with insurance preferred meter, Disp: 1 each, Rfl: 0    lancets Misc, To check BG one times daily, to use with insurance preferred meter, Disp: 100 each, Rfl: 11    Review of Systems   Constitutional:  Negative for chills, fatigue and fever.   Respiratory:  Negative for chest tightness, shortness of breath and wheezing.    Cardiovascular:  Negative for chest pain, palpitations and leg swelling.   Gastrointestinal:  Negative for abdominal pain, constipation, nausea and vomiting.   Genitourinary:  Negative for difficulty urinating.   Musculoskeletal:  Negative for myalgias.   Skin:  Negative for color change, rash and wound.   Neurological:  Negative for dizziness, tremors, syncope, facial asymmetry, speech difficulty, weakness, light-headedness, numbness and headaches.   Psychiatric/Behavioral:  Negative for confusion and dysphoric mood. The patient is not nervous/anxious.        Objective:   /72 (BP Location: Left arm, Patient Position: Sitting, BP  "Method: Large (Manual))   Pulse (!) 59   Temp 97.2 °F (36.2 °C)   Resp 18   Ht 4' 10" (1.473 m)   Wt 66.1 kg (145 lb 11.6 oz)   SpO2 97%   BMI 30.46 kg/m²      Physical Exam  Constitutional:       General: She is not in acute distress.     Appearance: Normal appearance. She is not ill-appearing.   HENT:      Head: Normocephalic.   Eyes:      Extraocular Movements: Extraocular movements intact.      Conjunctiva/sclera: Conjunctivae normal.   Cardiovascular:      Rate and Rhythm: Bradycardia present. Rhythm irregular.      Heart sounds: Normal heart sounds. No murmur heard.  Pulmonary:      Effort: Pulmonary effort is normal. No respiratory distress.      Breath sounds: Normal breath sounds. No wheezing, rhonchi or rales.   Musculoskeletal:      Right lower leg: Edema (trace) present.      Left lower leg: Edema (trace) present.      Comments: Ambulating with quad cane   Skin:     General: Skin is warm and dry.      Coloration: Skin is not pale.      Findings: No erythema or rash.   Neurological:      Mental Status: She is alert and oriented to person, place, and time.      Coordination: Coordination normal.      Gait: Gait normal.   Psychiatric:         Mood and Affect: Mood normal.         Behavior: Behavior normal.         Assessment & Plan     1. Paroxysmal atrial fibrillation  Assessment & Plan:  Rate controlled today in clinic. 2 recent episodes of AF/RVR. Completed 2 week holter, awaiting results. Continue amiodarone, Eliquis, and atenolol. Followed by cardiac electrophysiology.    Orders:  -     apixaban (ELIQUIS) 5 mg Tab; Take 1 tablet (5 mg total) by mouth 2 (two) times daily.  Dispense: 180 tablet; Refill: 3  -     amiodarone (PACERONE) 100 MG Tab; Take 1 tablet (100 mg total) by mouth once daily.  Dispense: 90 tablet; Refill: 3  -     atenoloL (TENORMIN) 25 MG tablet; Take 1 tablet (25 mg total) by mouth once daily.  Dispense: 90 tablet; Refill: 3    2. Type 2 diabetes mellitus with other specified " "complication, without long-term current use of insulin  Assessment & Plan:  Lab Results   Component Value Date    HGBA1C 5.0 11/17/2023     Stable/well controlled. Continue metformin.    Orders:  -     HEMOGLOBIN A1C; Future; Expected date: 05/17/2024  -     COMPREHENSIVE METABOLIC PANEL; Future; Expected date: 05/17/2024  -     blood-glucose meter kit; To check BG one times daily, to use with insurance preferred meter  Dispense: 1 each; Refill: 0  -     lancets Misc; To check BG one times daily, to use with insurance preferred meter  Dispense: 100 each; Refill: 11  -     blood sugar diagnostic Strp; To check BG one times daily, to use with insurance preferred meter  Dispense: 100 each; Refill: 11    3. Essential hypertension  Assessment & Plan:  Stable. Continue lisinopril-HCTZ and atenolol.     Orders:  -     COMPREHENSIVE METABOLIC PANEL; Future; Expected date: 05/17/2024    4. Mixed hyperlipidemia  Assessment & Plan:  Lab Results   Component Value Date    CHOL 160 11/17/2023    CHOL 178 02/27/2023    CHOL 173 02/22/2022     Lab Results   Component Value Date    HDL 63 11/17/2023    HDL 68 02/27/2023    HDL 73 02/22/2022     Lab Results   Component Value Date    LDLCALC 83.0 11/17/2023    LDLCALC 94 02/27/2023    LDLCALC 87 02/22/2022     Lab Results   Component Value Date    TRIG 70 11/17/2023    TRIG 88 02/27/2023    TRIG 71 02/22/2022       Lab Results   Component Value Date    CHOLHDL 39.4 11/17/2023     Stable.  Continue simvastatin.      5. Class 1 obesity due to excess calories with serious comorbidity and body mass index (BMI) of 31.0 to 31.9 in adult  Assessment & Plan:  Counseled on importance of diet and exercise. Encouraged patient to have an active lifestyle with regular exercise with healthy eating.             JAY JAY Yanez      Portions of this note may have been created with voice recognition software. Occasional "wrong-word" or "sound-a-like" substitutions may have occurred due to the " inherent limitations of voice recognition software. Please, read the note carefully and recognize, using context, where substitutions have occurred.

## 2024-05-17 NOTE — ASSESSMENT & PLAN NOTE
Lab Results   Component Value Date    CHOL 160 11/17/2023    CHOL 178 02/27/2023    CHOL 173 02/22/2022     Lab Results   Component Value Date    HDL 63 11/17/2023    HDL 68 02/27/2023    HDL 73 02/22/2022     Lab Results   Component Value Date    LDLCALC 83.0 11/17/2023    LDLCALC 94 02/27/2023    LDLCALC 87 02/22/2022     Lab Results   Component Value Date    TRIG 70 11/17/2023    TRIG 88 02/27/2023    TRIG 71 02/22/2022       Lab Results   Component Value Date    CHOLHDL 39.4 11/17/2023     Stable.  Continue simvastatin.

## 2024-05-17 NOTE — ASSESSMENT & PLAN NOTE
Rate controlled today in clinic. 2 recent episodes of AF/RVR. Completed 2 week holter, awaiting results. Continue amiodarone, Eliquis, and atenolol. Followed by cardiac electrophysiology.

## 2024-05-17 NOTE — ASSESSMENT & PLAN NOTE
Lab Results   Component Value Date    HGBA1C 5.0 11/17/2023     Stable/well controlled. Continue metformin.

## 2024-05-21 ENCOUNTER — TELEPHONE (OUTPATIENT)
Dept: INTERNAL MEDICINE | Facility: CLINIC | Age: 79
End: 2024-05-21
Payer: MEDICARE

## 2024-05-21 DIAGNOSIS — E11.69 TYPE 2 DIABETES MELLITUS WITH OTHER SPECIFIED COMPLICATION, WITHOUT LONG-TERM CURRENT USE OF INSULIN: ICD-10-CM

## 2024-05-21 DIAGNOSIS — E78.2 MIXED HYPERLIPIDEMIA: Primary | ICD-10-CM

## 2024-05-21 DIAGNOSIS — I48.0 PAROXYSMAL ATRIAL FIBRILLATION: ICD-10-CM

## 2024-05-21 RX ORDER — SIMVASTATIN 20 MG/1
20 TABLET, FILM COATED ORAL NIGHTLY
Qty: 90 TABLET | Refills: 3 | Status: SHIPPED | OUTPATIENT
Start: 2024-05-21 | End: 2025-05-21

## 2024-05-21 RX ORDER — ATENOLOL 25 MG/1
25 TABLET ORAL DAILY
Qty: 90 TABLET | Refills: 3 | Status: SHIPPED | OUTPATIENT
Start: 2024-05-21 | End: 2025-05-21

## 2024-05-21 RX ORDER — AMIODARONE HYDROCHLORIDE 100 MG/1
100 TABLET ORAL DAILY
Qty: 90 TABLET | Refills: 3 | Status: SHIPPED | OUTPATIENT
Start: 2024-05-21 | End: 2025-05-21

## 2024-05-21 RX ORDER — LANCETS
EACH MISCELLANEOUS
Qty: 100 EACH | Refills: 11 | Status: SHIPPED | OUTPATIENT
Start: 2024-05-21

## 2024-05-21 RX ORDER — INSULIN PUMP SYRINGE, 3 ML
EACH MISCELLANEOUS
Qty: 1 EACH | Refills: 0 | Status: SHIPPED | OUTPATIENT
Start: 2024-05-21 | End: 2025-05-21

## 2024-05-25 ENCOUNTER — HOSPITAL ENCOUNTER (EMERGENCY)
Facility: HOSPITAL | Age: 79
Discharge: HOME | End: 2024-05-25
Attending: EMERGENCY MEDICINE
Payer: MEDICARE

## 2024-05-25 VITALS
RESPIRATION RATE: 20 BRPM | SYSTOLIC BLOOD PRESSURE: 185 MMHG | WEIGHT: 147.06 LBS | BODY MASS INDEX: 30.87 KG/M2 | HEART RATE: 55 BPM | DIASTOLIC BLOOD PRESSURE: 84 MMHG | TEMPERATURE: 98 F | HEIGHT: 58 IN | OXYGEN SATURATION: 98 %

## 2024-05-25 DIAGNOSIS — I10 HYPERTENSION: ICD-10-CM

## 2024-05-25 DIAGNOSIS — R42 DIZZINESS: ICD-10-CM

## 2024-05-25 DIAGNOSIS — R03.0 ELEVATED BLOOD PRESSURE READING: Primary | ICD-10-CM

## 2024-05-25 PROCEDURE — 93005 ELECTROCARDIOGRAM TRACING: CPT | Mod: ER

## 2024-05-25 PROCEDURE — 93010 ELECTROCARDIOGRAM REPORT: CPT | Mod: ,,, | Performed by: INTERNAL MEDICINE

## 2024-05-25 PROCEDURE — 99283 EMERGENCY DEPT VISIT LOW MDM: CPT | Mod: 25,ER

## 2024-05-25 RX ORDER — LANCETS 33 GAUGE
EACH MISCELLANEOUS
COMMUNITY
Start: 2024-05-22

## 2024-05-25 RX ORDER — DIPHENHYDRAMINE HCL 25 MG
TABLET ORAL
COMMUNITY
Start: 2024-05-22

## 2024-05-25 NOTE — ED PROVIDER NOTES
Emergency Medicine Provider Note - 5/25/2024       History     Chief Complaint   Patient presents with    Hypertension     Pt concerned with b/p at home elevated 184/103.denies any symptoms       Allergies:  Review of patient's allergies indicates:   Allergen Reactions    Augmentin [amoxicillin-pot clavulanate]      Fixed drug reaction      Amoxicillin Itching, Rash and Other (See Comments)     Yeast infection        History of Present Illness   HPI    5/25/2024, 11:04 AM  The history is provided by the patient    Guillermina Person is a 79 y.o. female presenting to the ED for dizziness.  Patient reports that she has been compliant with her blood pressure medication.  This morning at 7:00 a.m. she felt dizzy.  It was worse when she was going from a sitting position to standing position.  She denies any recent medication changes.  Patient denies any headache, vision changes, numbness or weakness to 1 side, slurred speech, orthopnea, paroxysmal nocturnal dyspnea, blood in stool, black tarry stool, nausea, vomiting, diarrhea, fever, cough, or difficulty urinating.  She currently feels back to normal now.      Reviewed Holter Monitor:   The predominant rhythm is sinus.     The patient was monitored for a total of 13d 22h, underlying rhythm is Sinus.  The minimum heart rate was 51 bpm; the maximum 81 bpm; the average 60 bpm.  0 % of Atrial fibrillation/Atrial flutter with longest episode of 0 ms.  The total burden of AV Block present was 0 % [Complete Heart Block: 0 %; Advanced (High Grade):  0 %; 2nd Degree, Mobitz II: 0 %; 2nd Degree, Mobitz I: 0 %].  There were 0 pauses, the longest pause was 0 ms at --.  Total count of Ventricular Tachycardia (VT): 0 episode(s). Longest VT: 0 s on --. Fastest VT: -- bpm on  --.  0 supraventricular episodes were found. Longest SVT Episode 0 s, Fastest SVT -- bpm  There were a total of 41 PVCs with 1 morphologies and 0 couplets. Overall PVC [place] at < 0.01 %  There were a total of 0 Other  Beats. There were 0 total number of paced beats.  There were a total of 285 PSVCs with 1 morphologies and 0 couplets. Overall PSVC [place] at  0.03 %  There is a total of 0 patient events      Reviewed ECHO:  2022  Transthoracic Echocardiographic Report     Patient Name: JOSE LUIS MCCRAY G Patient ID: [id number] Account #:   : 1945 (77y 3m) Gender: F Study Date: 2022 09:48:46 AM   Ht(Cm): 150 Wt(Kg): 80 BSA: 1.83 Accession #: [id number]   Sonographer: Tim [facility] Provider: JOY STANLEY     Quality: The study images were of technically adequate quality. Ref.Provider: JOY STANLEY     -----------------------    CONCLUSIONS:   1. Normal left ventricular cavity size. Normal left ventricular systolic function. LVEF   55 - 65%. Moderate (Grade II) diastolic dysfunction (pseudonormal filling).   2. Normal right ventricular size. Normal right ventricular systolic function.   3. Mild mitral valve regurgitation.   4. Mild tricuspid valve regurgitation.   5. The ascending aorta is normal in size.     -----------------------    PROCEDURES:   Echocardiographic Report: Transthoracic complete echo, 2D, spectral and tissue Doppler,   color flow Doppler, M-mode.   Sonographer: Leslie Rachel RDCS, RVS      Latest Reference Range & Units 24 09:12   Sodium 136 - 145 mmol/L 143   Potassium 3.5 - 5.1 mmol/L 4.0   Chloride 95 - 110 mmol/L 105   CO2 23 - 29 mmol/L 26   Anion Gap 8 - 16 mmol/L 12   BUN 8 - 23 mg/dL 17   Creatinine 0.5 - 1.4 mg/dL 1.1   eGFR >60 mL/min/1.73 m^2 50.8 !   Glucose 70 - 110 mg/dL 87   Calcium 8.7 - 10.5 mg/dL 8.8   ALP 55 - 135 U/L 67   PROTEIN TOTAL 6.0 - 8.4 g/dL 6.3   Albumin 3.5 - 5.2 g/dL 3.2 (L)   BILIRUBIN TOTAL 0.1 - 1.0 mg/dL 0.9   AST 10 - 40 U/L 15   ALT 10 - 44 U/L 12   !: Data is abnormal  (L): Data is abnormally low    Arrival mode: Private Vehicle     PCP: Thomas Pimentel MD     Past Medical History:  Past Medical History:   Diagnosis Date    Atrial flutter      Brain tumor (benign)     Cancer     Coronary artery disease     Diabetes mellitus     High cholesterol     Hypertension        Past Surgical History:  Past Surgical History:   Procedure Laterality Date    ABDOMINAL SURGERY      APPENDECTOMY      BRAIN SURGERY       SECTION      HYSTERECTOMY      OOPHORECTOMY      TONSILLECTOMY           Family History:  No family history on file.    Social History:  Social History     Tobacco Use    Smoking status: Never    Smokeless tobacco: Not on file   Substance and Sexual Activity    Alcohol use: No    Drug use: No    Sexual activity: Not on file        Review of Systems   Review of Systems   Constitutional:  Negative for fever.   Respiratory:  Negative for shortness of breath.    Cardiovascular:  Negative for chest pain.   Gastrointestinal:  Negative for nausea and vomiting.   Genitourinary:  Negative for dysuria.   Musculoskeletal:  Negative for back pain.   Skin:  Negative for rash.   Neurological:  Positive for dizziness. Negative for seizures, syncope, facial asymmetry, speech difficulty, weakness, light-headedness, numbness and headaches.   Hematological:  Does not bruise/bleed easily.        Physical Exam     Initial Vitals [24 1044]   BP Pulse Resp Temp SpO2   (!) 185/84 (!) 55 20 98.4 °F (36.9 °C) 98 %      MAP       --          Physical Exam    Nursing Notes and Vital Signs Reviewed.  Constitutional: Patient is in no apparent distress. Well-developed and well-nourished.  Head: Atraumatic. Normocephalic  Eyes: PERRL. EOM intact. Conjunctivae are not pale. No scleral icterus.  ENT: Mucous membranes are moist. Oropharynx is clear and symmetric.    Neck: Supple. Full ROM. No lymphadenopathy.  Cardiovascular: Regular rate. Regular rhythm. No murmurs, rubs, or gallops. Distal pulses are 2+ and symmetric.  Pulmonary/Chest: No respiratory distress. Clear to auscultation bilaterally. No wheezing or rales.  Abdominal: Soft and non-distended.  There is no  "tenderness.  No rebound, guarding, or rigidity. Good bowel sounds.  Musculoskeletal: Moves all extremities. No obvious deformities. No edema. No calf tenderness.  Skin: Warm and dry.  Neurological:  Alert, awake, and appropriate.  Normal speech.  No acute focal neurological deficits are appreciated.  Cranial nerves 2-12 intact.  Finger-to-nose intact.  Negative pronator drift.  GCS 15.  Patient is able to walk down the hallway briskly without assistance.  Psychiatric: Normal affect. Good eye contact. Appropriate in content.     ED Course   ED Procedures:  Procedures    ED Vital Signs:  Vitals:    05/25/24 1044   BP: (!) 185/84   Pulse: (!) 55   Resp: 20   Temp: 98.4 °F (36.9 °C)   TempSrc: Oral   SpO2: 98%   Weight: 66.7 kg (147 lb 0.8 oz)   Height: 4' 10" (1.473 m)       Abnormal Lab Results:  Labs Reviewed - No data to display     All Lab Results:  none      The EKG was ordered, reviewed, and independently interpreted by the ED provider:  Sinus bradycardia.  Left bundle-branch block.  Rate of 51.  No arrhythmia.  Known history of left bundle-branch block          Imaging Results:  Imaging Results    None               The Emergency Provider reviewed the vital signs and test results, which are outlined above.     ED Discussion   ED Medication(s):  Medications - No data to display    ED Course as of 05/25/24 1501   Sat May 25, 2024   1153 Shared decision-making.  Patient appears to be asymptomatic.  Patient has had a Holter monitor recently that showed no episodes of AFib.  Currently on Eliquis.  Patient is asymptomatic.  Patient declines blood work at this time.  No focal deficits are noted [LB]      ED Course User Index  [LB] Vee Llanos DO            11:54 AM  Reassessment: Dr. Llanos reassessed the pt.  The pt is resting comfortably and is NAD.  Pt states their sx have improved. Discussed test results, shared treatment plan, specific conditions for return, and the need for f/u.  Answered their " questions at this time.  Pt understands and agrees to the plan.  The pt has remained hemodynamically stable through ED course and is stable for discharge.    I discussed with patient and/or family/caretaker that evaluation in the ED does not suggest any emergent or life threatening medical conditions requiring immediate intervention beyond what was provided in the ED, and I believe patient is safe for discharge.  Regardless, an unremarkable evaluation in the ED does not preclude the development or presence of a serious of life threatening condition. As such, patient was instructed to return immediately for any worsening or change in current symptoms.     MIPS Measures     Smoker? No     Hypertension: History of Hypertension: The patient has elevated blood pressure (higher than 120/80) while being treated in the ED but has a history of hypertension.       Medical Decision Making           Additional MDM:     NIH Stroke Scale:   Interval = baseline (upon arrival/admit)  Level of consciousness = 0 - alert  LOC questions = 0 - answers both correctly  LOC commands = 0 - performs both correctly  Best gaze = 0 - normal  Visual = 0 - no visual loss  Facial palsy = 0 - normal  Motor left arm =  0 - no drift  Motor right arm =  0 - no drift  Motor left leg = 0 - no drift  Motor right leg =  0 - no drift  Limb ataxia = 0 - absent  Sensory = 0 - normal  Best language = 0 - no aphasia  Dysarthria = 0 - normal articulation  Extinction and inattention = 0 - no neglect  NIH Stroke Scale Total = 0           Medical Decision Making  Differential diagnosis:  Arrhythmia, hypoglycemia, electrolyte abnormality, acute kidney injury    ED course: EKG shows left bundle-branch block.  Sinus rhythm.  Negative Sgarbossa.  Patient reports that she feels back to normal.  She did not have any numbness or weakness to 1 side, slurred speech, difficulty with balance, chest pain, chest pressure, shortness of breath, difficulty breathing, nausea,  vomiting.  Patient does not appear anemic.  Patient has not had any nausea, vomiting, diarrhea to suggest electrolyte abnormality.  Patient's NIH stroke scale is 0  Shared decision-making was made: Patient does not have any signs or symptoms of acute stroke on exam.  Patient is able to ambulate up and down the hallway without assistance.  Patient declines blood work as she recently had it.    Amount and/or Complexity of Data Reviewed  External Data Reviewed: labs, radiology and notes.     Details: See HPI  Labs:      Details: Recommended, patient declined.  Stating that she feels back to her baseline.        Coding    Prescription Management: I performed a review of the patient's current Rx medication list as input by nursing staff.    Discharge Medication List as of 5/25/2024 11:53 AM        CONTINUE these medications which have NOT CHANGED    Details   amiodarone (PACERONE) 100 MG Tab Take 1 tablet (100 mg total) by mouth once daily., Starting Tue 5/21/2024, Until Wed 5/21/2025, Normal      apixaban (ELIQUIS) 5 mg Tab Take 1 tablet (5 mg total) by mouth 2 (two) times daily., Starting Fri 5/17/2024, Until Sat 5/17/2025, Print      atenoloL (TENORMIN) 25 MG tablet Take 1 tablet (25 mg total) by mouth once daily., Starting Tue 5/21/2024, Until Wed 5/21/2025, Normal      blood sugar diagnostic Strp To check BG one times daily, to use with insurance preferred meter, Normal      blood-glucose meter kit To check BG one times daily, to use with insurance preferred meter, Normal      fish-flx-prm-blkbor-om 3,6,9 5 400-400-200 mg Cap Take 1 tablet by mouth once daily., Historical Med      furosemide (LASIX) 40 MG tablet Take 1 tablet (40 mg total) by mouth daily as needed., Starting Fri 3/1/2024, Until Wed 8/28/2024 at 2359, Normal      !! lancets Misc To check BG one times daily, to use with insurance preferred meter, Normal      lisinopriL-hydrochlorothiazide (PRINZIDE,ZESTORETIC) 20-12.5 mg per tablet Take 2 tablets by  "mouth once daily., Starting Fri 3/1/2024, Until Wed 8/28/2024, Normal      loratadine (CLARITIN REDITABS) 10 mg dissolvable tablet Take 1 tablet by mouth daily as needed., Historical Med      metFORMIN (GLUCOPHAGE) 500 MG tablet Take 1 tablet (500 mg total) by mouth once daily., Starting Fri 3/1/2024, Until Wed 8/28/2024, Normal      simvastatin (ZOCOR) 20 MG tablet Take 1 tablet (20 mg total) by mouth every evening., Starting Tue 5/21/2024, Until Wed 5/21/2025, Normal      TRUE METRIX AIR GLUCOSE METER Misc Historical Med      !! TRUEPLUS LANCETS 33 gauge Misc Historical Med      albuterol (PROVENTIL/VENTOLIN HFA) 90 mcg/actuation inhaler Inhale 1-2 puffs into the lungs every 6 (six) hours as needed for Wheezing or Shortness of Breath. Rescue, Starting Wed 3/27/2024, Until Mon 9/23/2024 at 2359, Normal       !! - Potential duplicate medications found. Please discuss with provider.           Discussed case with:N/A      Portions of this note may have been created with voice recognition software. Occasional "wrong-word" or "sound-a-like" substitutions may have occurred due to the inherent limitations of voice recognition software. Please, read the note carefully and recognize, using context, where substitutions have occurred.          Clinical Impression       ICD-10-CM ICD-9-CM   1. Elevated blood pressure reading  R03.0 796.2   2. Hypertension  I10 401.9   3. Dizziness  R42 780.4        Disposition        Disposition: Discharge to home  Patient condition: Stable he is just tight enough trauma he was pinned between a fence      ED Follow-up      Follow-up Information       Thomas Pimentel MD In 2 days.    Specialties: Internal Medicine, Family Medicine  Why: Return to emergency department for chest pain, chest pressure, severe headache, nausea, vomiting, numbness or weakness to 1 side, difficulty breathing, cough, difficulty with balance  Contact information:  [address] [facility] [address]  [place] " [id number]  [phone]                                      Vee Llanos,   05/25/24 1508

## 2024-05-27 LAB
OHS QRS DURATION: 164 MS
OHS QTC CALCULATION: 505 MS

## 2024-05-29 RX ORDER — LANCING DEVICE
1 EACH MISCELLANEOUS DAILY
Qty: 100 EACH | Refills: 11 | Status: SHIPPED | OUTPATIENT
Start: 2024-05-29 | End: 2025-05-29

## 2024-06-10 ENCOUNTER — TELEPHONE (OUTPATIENT)
Dept: INTERNAL MEDICINE | Facility: CLINIC | Age: 79
End: 2024-06-10
Payer: MEDICARE

## 2024-06-10 DIAGNOSIS — K43.9 HERNIA OF ANTERIOR ABDOMINAL WALL: Primary | ICD-10-CM

## 2024-06-10 NOTE — TELEPHONE ENCOUNTER
Patient came in today and requested a referral for Dr. Emeka Garcia, [facility]. Phone #- [phone]  Fax- [phone]  For her [facility]

## 2024-07-09 ENCOUNTER — TELEPHONE (OUTPATIENT)
Dept: INTERNAL MEDICINE | Facility: CLINIC | Age: 79
End: 2024-07-09
Payer: MEDICARE

## 2024-07-27 ENCOUNTER — HOSPITAL ENCOUNTER (EMERGENCY)
Facility: HOSPITAL | Age: 79
Discharge: HOME | End: 2024-07-27
Attending: EMERGENCY MEDICINE
Payer: MEDICARE

## 2024-07-27 VITALS
TEMPERATURE: 98 F | HEART RATE: 60 BPM | RESPIRATION RATE: 16 BRPM | OXYGEN SATURATION: 100 % | BODY MASS INDEX: 30.87 KG/M2 | SYSTOLIC BLOOD PRESSURE: 164 MMHG | WEIGHT: 147.69 LBS | DIASTOLIC BLOOD PRESSURE: 56 MMHG

## 2024-07-27 DIAGNOSIS — R07.9 CHEST PAIN: Primary | ICD-10-CM

## 2024-07-27 LAB
ALBUMIN SERPL BCP-MCNC: 3.8 G/DL (ref 3.5–5.2)
ALP SERPL-CCNC: 82 U/L (ref 55–135)
ALT SERPL W/O P-5'-P-CCNC: 15 U/L (ref 10–44)
ANION GAP SERPL CALC-SCNC: 12 MMOL/L (ref 8–16)
AST SERPL-CCNC: 17 U/L (ref 10–40)
BACTERIA #/AREA URNS AUTO: NORMAL /HPF
BASOPHILS # BLD AUTO: 0.03 K/UL (ref 0–0.2)
BASOPHILS NFR BLD: 0.4 % (ref 0–1.9)
BILIRUB SERPL-MCNC: 0.6 MG/DL (ref 0.1–1)
BILIRUB UR QL STRIP: NEGATIVE
BNP SERPL-MCNC: 265 PG/ML (ref 0–99)
BUN SERPL-MCNC: 12 MG/DL (ref 8–23)
CALCIUM SERPL-MCNC: 9.9 MG/DL (ref 8.7–10.5)
CHLORIDE SERPL-SCNC: 103 MMOL/L (ref 95–110)
CLARITY UR REFRACT.AUTO: ABNORMAL
CO2 SERPL-SCNC: 27 MMOL/L (ref 23–29)
COLOR UR AUTO: YELLOW
CREAT SERPL-MCNC: 1.1 MG/DL (ref 0.5–1.4)
DIFFERENTIAL METHOD BLD: ABNORMAL
EOSINOPHIL # BLD AUTO: 0.1 K/UL (ref 0–0.5)
EOSINOPHIL NFR BLD: 1.2 % (ref 0–8)
ERYTHROCYTE [DISTWIDTH] IN BLOOD BY AUTOMATED COUNT: 13.5 % (ref 11.5–14.5)
EST. GFR  (NO RACE VARIABLE): 50.8 ML/MIN/1.73 M^2
GLUCOSE SERPL-MCNC: 94 MG/DL (ref 70–110)
GLUCOSE UR QL STRIP: NEGATIVE
HCT VFR BLD AUTO: 41.7 % (ref 37–48.5)
HGB BLD-MCNC: 13.6 G/DL (ref 12–16)
HGB UR QL STRIP: ABNORMAL
HYALINE CASTS UR QL AUTO: 1 /LPF
IMM GRANULOCYTES # BLD AUTO: 0.02 K/UL (ref 0–0.04)
IMM GRANULOCYTES NFR BLD AUTO: 0.3 % (ref 0–0.5)
KETONES UR QL STRIP: NEGATIVE
LEUKOCYTE ESTERASE UR QL STRIP: ABNORMAL
LYMPHOCYTES # BLD AUTO: 1.4 K/UL (ref 1–4.8)
LYMPHOCYTES NFR BLD: 18.2 % (ref 18–48)
MCH RBC QN AUTO: 28.6 PG (ref 27–31)
MCHC RBC AUTO-ENTMCNC: 32.6 G/DL (ref 32–36)
MCV RBC AUTO: 88 FL (ref 82–98)
MICROSCOPIC COMMENT: NORMAL
MONOCYTES # BLD AUTO: 0.5 K/UL (ref 0.3–1)
MONOCYTES NFR BLD: 6.8 % (ref 4–15)
NEUTROPHILS # BLD AUTO: 5.7 K/UL (ref 1.8–7.7)
NEUTROPHILS NFR BLD: 73.1 % (ref 38–73)
NITRITE UR QL STRIP: NEGATIVE
NRBC BLD-RTO: 0 /100 WBC
PH UR STRIP: 6 [PH] (ref 5–8)
PLATELET # BLD AUTO: 235 K/UL (ref 150–450)
PMV BLD AUTO: 9.4 FL (ref 9.2–12.9)
POTASSIUM SERPL-SCNC: 3.5 MMOL/L (ref 3.5–5.1)
PROT SERPL-MCNC: 7.4 G/DL (ref 6–8.4)
PROT UR QL STRIP: NEGATIVE
RBC # BLD AUTO: 4.75 M/UL (ref 4–5.4)
RBC #/AREA URNS AUTO: 2 /HPF (ref 0–4)
SODIUM SERPL-SCNC: 142 MMOL/L (ref 136–145)
SP GR UR STRIP: 1.01 (ref 1–1.03)
SQUAMOUS #/AREA URNS AUTO: 2 /HPF
TROPONIN I SERPL DL<=0.01 NG/ML-MCNC: 0.02 NG/ML (ref 0–0.03)
URN SPEC COLLECT METH UR: ABNORMAL
UROBILINOGEN UR STRIP-ACNC: NEGATIVE EU/DL
WBC # BLD AUTO: 7.79 K/UL (ref 3.9–12.7)
WBC #/AREA URNS AUTO: 2 /HPF (ref 0–5)

## 2024-07-27 PROCEDURE — 94761 N-INVAS EAR/PLS OXIMETRY MLT: CPT | Mod: ER

## 2024-07-27 PROCEDURE — 81000 URINALYSIS NONAUTO W/SCOPE: CPT | Mod: ER | Performed by: EMERGENCY MEDICINE

## 2024-07-27 PROCEDURE — 80053 COMPREHEN METABOLIC PANEL: CPT | Mod: ER | Performed by: EMERGENCY MEDICINE

## 2024-07-27 PROCEDURE — 85025 COMPLETE CBC W/AUTO DIFF WBC: CPT | Mod: ER | Performed by: EMERGENCY MEDICINE

## 2024-07-27 PROCEDURE — 93010 ELECTROCARDIOGRAM REPORT: CPT | Mod: ,,, | Performed by: INTERNAL MEDICINE

## 2024-07-27 PROCEDURE — 83880 ASSAY OF NATRIURETIC PEPTIDE: CPT | Mod: ER | Performed by: EMERGENCY MEDICINE

## 2024-07-27 PROCEDURE — 99285 EMERGENCY DEPT VISIT HI MDM: CPT | Mod: 25,ER

## 2024-07-27 PROCEDURE — 84484 ASSAY OF TROPONIN QUANT: CPT | Mod: ER | Performed by: EMERGENCY MEDICINE

## 2024-07-27 PROCEDURE — 93005 ELECTROCARDIOGRAM TRACING: CPT | Mod: ER

## 2024-07-28 LAB
OHS QRS DURATION: 168 MS
OHS QTC CALCULATION: 543 MS

## 2024-07-28 NOTE — ED PROVIDER NOTES
Encounter Date: 2024       History     Chief Complaint   Patient presents with    checked out     About an hour ago pt wasn't feeling well, was sweating, nauseated with diarrhea. Takes meds around 5pm. Took a nitro around 6pm. Family member that is emt came to her house and took bp and it was low about 15 min after nitro. Pt denies chest pain. Bp came up and pt felt better but family told her she should still come in. Pts complaints have resolved.      The history is provided by the patient.   Pt reports feeling bad earlier today so she took a Nitro tab. Pt subsequently developed hypotension for several minutes which resolved upon a repeat BP check. Pt states she feels normal and does not have any complaints. Pt denies CP/SOB/Abd pain, denies any c/o at present.      Review of patient's allergies indicates:   Allergen Reactions    Augmentin [amoxicillin-pot clavulanate]      Fixed drug reaction      Amoxicillin Itching, Rash and Other (See Comments)     Yeast infection     Past Medical History:   Diagnosis Date    Atrial flutter     Brain tumor (benign)     Cancer     Coronary artery disease     Diabetes mellitus     High cholesterol     Hypertension      Past Surgical History:   Procedure Laterality Date    ABDOMINAL SURGERY      APPENDECTOMY      BRAIN SURGERY       SECTION      HYSTERECTOMY      OOPHORECTOMY      TONSILLECTOMY       No family history on file.  Social History     Tobacco Use    Smoking status: Never   Substance Use Topics    Alcohol use: No    Drug use: No     Review of Systems   Constitutional:  Negative for fever.   HENT:  Negative for sore throat.    Respiratory:  Negative for shortness of breath.    Cardiovascular:  Negative for chest pain.   Gastrointestinal:  Negative for nausea.   Genitourinary:  Negative for dysuria.   Musculoskeletal:  Negative for back pain.   Skin:  Negative for rash.   Neurological:  Negative for weakness.   Hematological:  Does not bruise/bleed easily.        Physical Exam     Initial Vitals [07/27/24 1911]   BP Pulse Resp Temp SpO2   (!) 137/95 60 18 98 °F (36.7 °C) 99 %      MAP       --         Physical Exam    Nursing note and vitals reviewed.  Constitutional: She appears well-developed and well-nourished. No distress.   HENT:   Head: Normocephalic and atraumatic.   Mouth/Throat: Oropharynx is clear and moist.   Eyes: Conjunctivae and EOM are normal. Pupils are equal, round, and reactive to light.   Neck: Neck supple.   Normal range of motion.  Cardiovascular:  Normal rate, regular rhythm and normal heart sounds.           Pulmonary/Chest: Breath sounds normal. No respiratory distress.   Abdominal: Abdomen is soft. Bowel sounds are normal. She exhibits no distension. There is no abdominal tenderness.   Musculoskeletal:         General: Normal range of motion.      Cervical back: Normal range of motion and neck supple.     Neurological: She is alert and oriented to person, place, and time. She has normal strength.   Skin: Skin is warm and dry.   Psychiatric: She has a normal mood and affect. Thought content normal.         ED Course   Procedures  Labs Reviewed   CBC W/ AUTO DIFFERENTIAL - Abnormal       Result Value    WBC 7.79      RBC 4.75      Hemoglobin 13.6      Hematocrit 41.7      MCV 88      MCH 28.6      MCHC 32.6      RDW 13.5      Platelets 235      MPV 9.4      Immature Granulocytes 0.3      Gran # (ANC) 5.7      Immature Grans (Abs) 0.02      Lymph # 1.4      Mono # 0.5      Eos # 0.1      Baso # 0.03      nRBC 0      Gran % 73.1 (*)     Lymph % 18.2      Mono % 6.8      Eosinophil % 1.2      Basophil % 0.4      Differential Method Automated     COMPREHENSIVE METABOLIC PANEL - Abnormal    Sodium 142      Potassium 3.5      Chloride 103      CO2 27      Glucose 94      BUN 12      Creatinine 1.1      Calcium 9.9      Total Protein 7.4      Albumin 3.8      Total Bilirubin 0.6      Alkaline Phosphatase 82      AST 17      ALT 15      eGFR 50.8 (*)      Anion Gap 12     B-TYPE NATRIURETIC PEPTIDE - Abnormal     (*)    URINALYSIS, REFLEX TO URINE CULTURE - Abnormal    Specimen UA Urine, Clean Catch      Color, UA Yellow      Appearance, UA Hazy (*)     pH, UA 6.0      Specific Gravity, UA 1.015      Protein, UA Negative      Glucose, UA Negative      Ketones, UA Negative      Bilirubin (UA) Negative      Occult Blood UA Trace (*)     Nitrite, UA Negative      Urobilinogen, UA Negative      Leukocytes, UA Trace (*)     Narrative:     Specimen Source->Urine   TROPONIN I    Troponin I 0.024     URINALYSIS MICROSCOPIC    RBC, UA 2      WBC, UA 2      Bacteria Occasional      Squam Epithel, UA 2      Hyaline Casts, UA 1      Microscopic Comment SEE COMMENT      Narrative:     Specimen Source->Urine     Results for orders placed or performed during the hospital encounter of 07/27/24   CBC auto differential   Result Value Ref Range    WBC 7.79 3.90 - 12.70 K/uL    RBC 4.75 4.00 - 5.40 M/uL    Hemoglobin 13.6 12.0 - 16.0 g/dL    Hematocrit 41.7 37.0 - 48.5 %    MCV 88 82 - 98 fL    MCH 28.6 27.0 - 31.0 pg    MCHC 32.6 32.0 - 36.0 g/dL    RDW 13.5 11.5 - 14.5 %    Platelets 235 150 - 450 K/uL    MPV 9.4 9.2 - 12.9 fL    Immature Granulocytes 0.3 0.0 - 0.5 %    Gran # (ANC) 5.7 1.8 - 7.7 K/uL    Immature Grans (Abs) 0.02 0.00 - 0.04 K/uL    Lymph # 1.4 1.0 - 4.8 K/uL    Mono # 0.5 0.3 - 1.0 K/uL    Eos # 0.1 0.0 - 0.5 K/uL    Baso # 0.03 0.00 - 0.20 K/uL    nRBC 0 0 /100 WBC    Gran % 73.1 (H) 38.0 - 73.0 %    Lymph % 18.2 18.0 - 48.0 %    Mono % 6.8 4.0 - 15.0 %    Eosinophil % 1.2 0.0 - 8.0 %    Basophil % 0.4 0.0 - 1.9 %    Differential Method Automated    Comprehensive metabolic panel   Result Value Ref Range    Sodium 142 136 - 145 mmol/L    Potassium 3.5 3.5 - 5.1 mmol/L    Chloride 103 95 - 110 mmol/L    CO2 27 23 - 29 mmol/L    Glucose 94 70 - 110 mg/dL    BUN 12 8 - 23 mg/dL    Creatinine 1.1 0.5 - 1.4 mg/dL    Calcium 9.9 8.7 - 10.5 mg/dL    Total Protein 7.4  6.0 - 8.4 g/dL    Albumin 3.8 3.5 - 5.2 g/dL    Total Bilirubin 0.6 0.1 - 1.0 mg/dL    Alkaline Phosphatase 82 55 - 135 U/L    AST 17 10 - 40 U/L    ALT 15 10 - 44 U/L    eGFR 50.8 (A) >60 mL/min/1.73 m^2    Anion Gap 12 8 - 16 mmol/L   Troponin I #1   Result Value Ref Range    Troponin I 0.024 0.000 - 0.026 ng/mL   BNP   Result Value Ref Range     (H) 0 - 99 pg/mL   Urinalysis, Reflex to Urine Culture Urine, Clean Catch    Specimen: Urine   Result Value Ref Range    Specimen UA Urine, Clean Catch     Color, UA Yellow Yellow, Straw, Jen    Appearance, UA Hazy (A) Clear    pH, UA 6.0 5.0 - 8.0    Specific Gravity, UA 1.015 1.005 - 1.030    Protein, UA Negative Negative    Glucose, UA Negative Negative    Ketones, UA Negative Negative    Bilirubin (UA) Negative Negative    Occult Blood UA Trace (A) Negative    Nitrite, UA Negative Negative    Urobilinogen, UA Negative <2.0 EU/dL    Leukocytes, UA Trace (A) Negative   Urinalysis Microscopic   Result Value Ref Range    RBC, UA 2 0 - 4 /hpf    WBC, UA 2 0 - 5 /hpf    Bacteria Occasional None-Occ /hpf    Squam Epithel, UA 2 /hpf    Hyaline Casts, UA 1 0-1/lpf /lpf    Microscopic Comment SEE COMMENT        EKG Readings: (Independently Interpreted)   Rhythm: Sinus Bradycardia. Heart Rate: 55. Conduction: RBBB. Clinical Impression: Sinus Bradycardia       Imaging Results              X-Ray Chest AP Portable (Final result)  Result time 07/27/24 20:27:34      Final result by Anam Ji MD (07/27/24 20:27:34)                   Impression:      No acute abnormality.      Electronically signed by: Anam Ji  Date:    07/27/2024  Time:    20:27               Narrative:    EXAMINATION:  XR CHEST AP PORTABLE    CLINICAL HISTORY:  Chest Pain;    TECHNIQUE:  Single frontal view of the chest was performed.    COMPARISON:  None    FINDINGS:  The lungs are clear, with normal appearance of pulmonary vasculature and no pleural effusion or pneumothorax.    The cardiac  silhouette is prominent.  The hilar and mediastinal contours are unremarkable.    Bones are intact.                                  Pt in nad, ready to go home, pt has no complaints and denies CP.       Medications - No data to display  Medical Decision Making  DDx ACS, UTI, Dehydration    Problems Addressed:  Chest pain: acute illness or injury    Amount and/or Complexity of Data Reviewed  Labs: ordered.  Radiology: ordered.  ECG/medicine tests: ordered and independent interpretation performed. Decision-making details documented in ED Course.                                      Clinical Impression:  Final diagnoses:  [R07.9] Chest pain (Primary)          ED Disposition Condition    Discharge Stable          ED Prescriptions    None       Follow-up Information       Follow up With Specialties Details Why Contact Info    Thomas Pimentel MD Internal Medicine, Family Medicine Schedule an appointment as soon as possible for a visit in 2 days  [address] [place] [id number]  [phone]      [facility] - Emergency Dept Emergency Medicine  If symptoms worsen [address] [place] [address] [phone]             Drew Fonseca MD  07/27/24 2122

## 2024-08-09 DIAGNOSIS — I10 ESSENTIAL HYPERTENSION: Primary | ICD-10-CM

## 2024-08-12 ENCOUNTER — OFFICE VISIT (OUTPATIENT)
Dept: CARDIOLOGY | Facility: CLINIC | Age: 79
End: 2024-08-12
Payer: MEDICARE

## 2024-08-12 ENCOUNTER — HOSPITAL ENCOUNTER (OUTPATIENT)
Dept: CARDIOLOGY | Facility: HOSPITAL | Age: 79
Discharge: HOME | End: 2024-08-12
Attending: INTERNAL MEDICINE
Payer: MEDICARE

## 2024-08-12 ENCOUNTER — TELEPHONE (OUTPATIENT)
Dept: CARDIOLOGY | Facility: CLINIC | Age: 79
End: 2024-08-12
Payer: MEDICARE

## 2024-08-12 VITALS
SYSTOLIC BLOOD PRESSURE: 152 MMHG | HEIGHT: 58 IN | OXYGEN SATURATION: 99 % | BODY MASS INDEX: 32.9 KG/M2 | HEART RATE: 51 BPM | DIASTOLIC BLOOD PRESSURE: 82 MMHG | WEIGHT: 156.75 LBS

## 2024-08-12 DIAGNOSIS — E78.2 MIXED HYPERLIPIDEMIA: ICD-10-CM

## 2024-08-12 DIAGNOSIS — Z87.19 HISTORY OF HERNIA REPAIR: ICD-10-CM

## 2024-08-12 DIAGNOSIS — I44.7 LEFT BUNDLE BRANCH BLOCK: ICD-10-CM

## 2024-08-12 DIAGNOSIS — Z98.890 HISTORY OF HERNIA REPAIR: ICD-10-CM

## 2024-08-12 DIAGNOSIS — I10 ESSENTIAL HYPERTENSION: Primary | ICD-10-CM

## 2024-08-12 DIAGNOSIS — I10 ESSENTIAL HYPERTENSION: ICD-10-CM

## 2024-08-12 DIAGNOSIS — I48.0 PAROXYSMAL ATRIAL FIBRILLATION: ICD-10-CM

## 2024-08-12 LAB
OHS QRS DURATION: 164 MS
OHS QTC CALCULATION: 512 MS

## 2024-08-12 PROCEDURE — 1126F AMNT PAIN NOTED NONE PRSNT: CPT | Mod: CPTII,S$GLB,, | Performed by: INTERNAL MEDICINE

## 2024-08-12 PROCEDURE — 3079F DIAST BP 80-89 MM HG: CPT | Mod: CPTII,S$GLB,, | Performed by: INTERNAL MEDICINE

## 2024-08-12 PROCEDURE — 99999 PR PBB SHADOW E&M-EST. PATIENT-LVL IV: CPT | Mod: PBBFAC,,, | Performed by: INTERNAL MEDICINE

## 2024-08-12 PROCEDURE — 3288F FALL RISK ASSESSMENT DOCD: CPT | Mod: CPTII,S$GLB,, | Performed by: INTERNAL MEDICINE

## 2024-08-12 PROCEDURE — 93010 ELECTROCARDIOGRAM REPORT: CPT | Mod: ,,, | Performed by: INTERNAL MEDICINE

## 2024-08-12 PROCEDURE — 1101F PT FALLS ASSESS-DOCD LE1/YR: CPT | Mod: CPTII,S$GLB,, | Performed by: INTERNAL MEDICINE

## 2024-08-12 PROCEDURE — 93005 ELECTROCARDIOGRAM TRACING: CPT

## 2024-08-12 PROCEDURE — 99214 OFFICE O/P EST MOD 30 MIN: CPT | Mod: S$GLB,,, | Performed by: INTERNAL MEDICINE

## 2024-08-12 PROCEDURE — 1159F MED LIST DOCD IN RCRD: CPT | Mod: CPTII,S$GLB,, | Performed by: INTERNAL MEDICINE

## 2024-08-12 PROCEDURE — 3077F SYST BP >= 140 MM HG: CPT | Mod: CPTII,S$GLB,, | Performed by: INTERNAL MEDICINE

## 2024-08-12 RX ORDER — AMLODIPINE BESYLATE 5 MG/1
5 TABLET ORAL DAILY
Qty: 90 TABLET | Refills: 3 | Status: SHIPPED | OUTPATIENT
Start: 2024-08-12 | End: 2025-08-12

## 2024-08-12 NOTE — TELEPHONE ENCOUNTER
Clearance faxed to provided number.              ----- Message from Don Guzmán MD sent at 8/12/2024  1:05 PM CDT -----  Please fax my note  clearance to Dr Emeka Garcia [phone]  Okay to undergo her hernia surgery hold Eliquis for 3 days prior to her surgery.    Thank you

## 2024-08-12 NOTE — PROGRESS NOTES
Subjective:   Patient ID:  Guillermina Person is a 79 y.o. female who presents for evaluation of No chief complaint on file.      HPI  8.12.2024  Comes in for follow-up.  Going for hernia surgery.    EKG shows normal sinus rhythm and left bundle-branch block.    Denies any chest pain.    Her blood pressure continues to be elevated.  Also high I will other visits.  She states that she gets very hyper when she comes to the clinic.      3.20.2024  She was in the emergency room on March 14th she was in AFib with RVR.  Converted back to sinus and was discharged home.    She states that she usually takes atenolol and amiodarone every day but takes an extra dose of atenolol when she is in AFib.    She states that she feels her palpitations when she is in it.    No chest pain.  No dyspnea.        12.2023  78-year-old female.  Comes in to establish care with new doctor today.  I reviewed her records from Dr. Taylor at [facility].  Last saw him in June.  She has history of old left bundle-branch block that has been worked up in the past and found not to be ischemic.    She deals also with hernia.  However she has not very symptomatic.  She had seen general surgery recently.  And she was to continue conservative treatment.    Her functional status is very good.  Denies any exertional angina, dyspnea on exertion.  Lower extremity swelling.  No recent palpitations.  Syncope or presyncope.  She had 2 mechanical falls within a year however the last 1 was almost more than 7 months ago.  I reviewed Dr. Taylor's note mentioning possible referral for Watchman device however patient not interested and she does not have truly frequent falls per her history she was trying to grab something off the floor when something slid from underneath her.  Parox atrial flutter (atrial flutter ablation by Dr. Uriarte summer 2020.)  HTN  HLD   LBBB  2010 Cath [facility] for new LBBB- nonobstructive reportedly  JAYLIN in 1987 for endometrial cancer with no  chemo or RT.  Sp R cranial meningioma s/p resection   Mild LV dysfx 45-50% on echo in /Normalized in 2022       Past Medical History:   Diagnosis Date    Atrial flutter     Brain tumor (benign)     Cancer     Coronary artery disease     Diabetes mellitus     High cholesterol     Hypertension        Past Surgical History:   Procedure Laterality Date    ABDOMINAL SURGERY      APPENDECTOMY      BRAIN SURGERY       SECTION      HYSTERECTOMY      OOPHORECTOMY      TONSILLECTOMY         Social History     Tobacco Use    Smoking status: Never   Substance Use Topics    Alcohol use: No    Drug use: No       No family history on file.    Review of Systems   Cardiovascular:  Negative for chest pain, dyspnea on exertion, palpitations and syncope.   Genitourinary: Negative.    Neurological: Negative.        Current Outpatient Medications on File Prior to Visit   Medication Sig    albuterol (PROVENTIL/VENTOLIN HFA) 90 mcg/actuation inhaler Inhale 1-2 puffs into the lungs every 6 (six) hours as needed for Wheezing or Shortness of Breath. Rescue    amiodarone (PACERONE) 100 MG Tab Take 1 tablet (100 mg total) by mouth once daily.    apixaban (ELIQUIS) 5 mg Tab Take 1 tablet (5 mg total) by mouth 2 (two) times daily.    atenoloL (TENORMIN) 25 MG tablet Take 1 tablet (25 mg total) by mouth once daily.    blood sugar diagnostic Strp To check BG one times daily, to use with insurance preferred meter    blood-glucose meter kit To check BG one times daily, to use with insurance preferred meter    fish-flx-prm-blkbor-om 3,6,9 5 400-400-200 mg Cap Take 1 tablet by mouth once daily.    furosemide (LASIX) 40 MG tablet Take 1 tablet (40 mg total) by mouth daily as needed.    lancets Misc To check BG one times daily, to use with insurance preferred meter    lancing device (TRUEDRAW LANCING DEVICE) Misc 1 Lancet by Misc.(Non-Drug; Combo Route) route once daily.    lisinopriL-hydrochlorothiazide (PRINZIDE,ZESTORETIC) 20-12.5 mg  per tablet Take 2 tablets by mouth once daily.    loratadine (CLARITIN REDITABS) 10 mg dissolvable tablet Take 1 tablet by mouth daily as needed.    metFORMIN (GLUCOPHAGE) 500 MG tablet Take 1 tablet (500 mg total) by mouth once daily.    simvastatin (ZOCOR) 20 MG tablet Take 1 tablet (20 mg total) by mouth every evening.    TRUE METRIX AIR GLUCOSE METER Misc     TRUEPLUS LANCETS 33 gauge Misc      No current facility-administered medications on file prior to visit.       Objective:   Objective:  Wt Readings from Last 3 Encounters:   08/12/24 71.1 kg (156 lb 12 oz)   07/27/24 67 kg (147 lb 11.3 oz)   05/25/24 66.7 kg (147 lb 0.8 oz)     Temp Readings from Last 3 Encounters:   07/27/24 98 °F (36.7 °C) (Oral)   05/25/24 98.4 °F (36.9 °C) (Oral)   05/17/24 97.2 °F (36.2 °C)     BP Readings from Last 3 Encounters:   08/12/24 (!) 152/82   07/27/24 (!) 164/56   05/25/24 (!) 185/84     Pulse Readings from Last 3 Encounters:   08/12/24 (!) 51   07/27/24 60   05/25/24 (!) 55       Physical Exam  Vitals reviewed.   Constitutional:       Appearance: She is well-developed.   Neck:      Vascular: No carotid bruit.   Cardiovascular:      Rate and Rhythm: Normal rate and regular rhythm.      Pulses: Intact distal pulses.      Heart sounds: Normal heart sounds. No murmur heard.  Pulmonary:      Breath sounds: Normal breath sounds.   Neurological:      Mental Status: She is oriented to person, place, and time.         Lab Results   Component Value Date    CHOL 160 11/17/2023    CHOL 178 02/27/2023    CHOL 173 02/22/2022     Lab Results   Component Value Date    HDL 63 11/17/2023    HDL 68 02/27/2023    HDL 73 02/22/2022     Lab Results   Component Value Date    LDLCALC 83.0 11/17/2023    LDLCALC 94 02/27/2023    LDLCALC 87 02/22/2022     Lab Results   Component Value Date    TRIG 70 11/17/2023    TRIG 88 02/27/2023    TRIG 71 02/22/2022     Lab Results   Component Value Date    CHOLHDL 39.4 11/17/2023       Chemistry         Component Value Date/Time     07/27/2024 2004    K 3.5 07/27/2024 2004     07/27/2024 2004    CO2 27 07/27/2024 2004    BUN 12 07/27/2024 2004    CREATININE 1.1 07/27/2024 2004    GLU 94 07/27/2024 2004        Component Value Date/Time    CALCIUM 9.9 07/27/2024 2004    ALKPHOS 82 07/27/2024 2004    AST 17 07/27/2024 2004    ALT 15 07/27/2024 2004    BILITOT 0.6 07/27/2024 2004    ESTGFRAFRICA 68 07/19/2024 0904    ESTGFRAFRICA >60.0 07/11/2015 0915    EGFRNONAA >60.0 07/11/2015 0915          Lab Results   Component Value Date    TSH 3.476 03/14/2024     Lab Results   Component Value Date    INR 1.6 07/17/2020    INR 1.0 07/11/2015     Lab Results   Component Value Date    WBC 7.79 07/27/2024    HGB 13.6 07/27/2024    HCT 41.7 07/27/2024    MCV 88 07/27/2024     07/27/2024     BNP  @LABRCNTIP(BNP,BNPTRIAGEBLO)@  CrCl cannot be calculated (Patient's most recent lab result is older than the maximum 7 days allowed.).     Imaging:  ======    No results found for this or any previous visit.    No results found for this or any previous visit.    Results for orders placed during the hospital encounter of 11/29/16    X-Ray Chest PA And Lateral    Narrative  Findings: 2 views. Comparison June 7, 2010. Heart size stable. No new consolidation seen within the lungs. Mild prominence of the fissures, particularly the minor fissure new from prior radiograph. Multilevel degenerative change in the thoracic spine.  IMPRESSION:  No definite acute cardiopulmonary disease.      Electronically signed by: JASON HENDRIX MD  Date:     11/29/16  Time:    11:32    No results found for this or any previous visit.    No valid procedures specified.    No results found for this or any previous visit.      No results found for this or any previous visit.      No results found for this or any previous visit.      Diagnostic Results:  ECG: Reviewed    No results found for this or any previous visit.  No results found for this or  any previous visit.  Interpretation Summary  Show Result Comparison     The predominant rhythm is sinus.     The patient was monitored for a total of 13d 22h, underlying rhythm is Sinus.  The minimum heart rate was 51 bpm; the maximum 81 bpm; the average 60 bpm.  0 % of Atrial fibrillation/Atrial flutter with longest episode of 0 ms.  The total burden of AV Block present was 0 % [Complete Heart Block: 0 %; Advanced (High Grade):  0 %; 2nd Degree, Mobitz II: 0 %; 2nd Degree, Mobitz I: 0 %].  There were 0 pauses, the longest pause was 0 ms at --.  Total count of Ventricular Tachycardia (VT): 0 episode(s). Longest VT: 0 s on --. Fastest VT: -- bpm on  --.  0 supraventricular episodes were found. Longest SVT Episode 0 s, Fastest SVT -- bpm  There were a total of 41 PVCs with 1 morphologies and 0 couplets. Overall PVC Rougon at < 0.01 %  There were a total of 0 Other Beats. There were 0 total number of paced beats.  There were a total of 285 PSVCs with 1 morphologies and 0 couplets. Overall PSVC Rougon at  0.03 %  There is a total of 0 patient events    The ASCVD Risk score ([place] DK, et al., 2019) failed to calculate for the following reasons:    The 2019 ASCVD risk score is only valid for ages 40 to 79        Assessment and Plan:   Essential hypertension  -     amLODIPine (NORVASC) 5 MG tablet; Take 1 tablet (5 mg total) by mouth once daily.  Dispense: 90 tablet; Refill: 3    Left bundle branch block    Paroxysmal atrial fibrillation    Mixed hyperlipidemia    History of hernia repair          Continue with Eliquis, amiodarone, atenolol.  In sinus today.  Continue atenolol perioperatively.    Hold Eliquis for 72 hours prior to her surgery.  Cardiac monitor did not show AFib for 2 weeks.  Saw Dr. Silva.  Continue with same management.    Okay to undergo her hernia surgery.  Stable from cardiac standpoint.    Will add amlodipine give her blood pressure not at goal.  Reviewed all tests and above medical conditions with  patient in detail and formulated treatment plan.  Risk factor modification discussed.   Cardiac low salt diet discussed.  Maintaining healthy weight and weight loss goals were discussed in clinic.  EKG lbbb, NSR , unchanged from before  Follow up in  6 months  We will fax clearance to Dr Emeka Garcia [phone]

## 2024-08-27 DIAGNOSIS — I48.0 PAROXYSMAL ATRIAL FIBRILLATION: ICD-10-CM

## 2024-08-27 DIAGNOSIS — J40 BRONCHITIS: ICD-10-CM

## 2024-08-27 DIAGNOSIS — R06.00 DYSPNEA, UNSPECIFIED TYPE: ICD-10-CM

## 2024-08-27 LAB
LEFT EYE DM RETINOPATHY: NEGATIVE
RIGHT EYE DM RETINOPATHY: NEGATIVE

## 2024-08-27 RX ORDER — ALBUTEROL SULFATE 90 UG/1
1-2 INHALANT RESPIRATORY (INHALATION) EVERY 6 HOURS PRN
Qty: 60 G | Refills: 1 | Status: SHIPPED | OUTPATIENT
Start: 2024-08-27

## 2024-08-27 RX ORDER — ATENOLOL 25 MG/1
25 TABLET ORAL DAILY
Qty: 90 TABLET | Refills: 1 | Status: SHIPPED | OUTPATIENT
Start: 2024-08-27 | End: 2025-02-23

## 2024-08-27 RX ORDER — AMIODARONE HYDROCHLORIDE 100 MG/1
100 TABLET ORAL DAILY
Qty: 90 TABLET | Refills: 0 | Status: SHIPPED | OUTPATIENT
Start: 2024-08-27 | End: 2025-02-23

## 2024-09-10 ENCOUNTER — TELEPHONE (OUTPATIENT)
Dept: CARDIOLOGY | Facility: CLINIC | Age: 79
End: 2024-09-10
Payer: MEDICARE

## 2024-09-10 NOTE — TELEPHONE ENCOUNTER
Spoke with pt in regards to appt being canceled  due to weather.               ----- Message from Anamaria Ortiz sent at 9/10/2024  4:12 PM CDT -----  Patient is calling to see if her appointment in [place] will be cancelled on tomorrow. Please callback [phone]

## 2024-09-24 DIAGNOSIS — E11.69 TYPE 2 DIABETES MELLITUS WITH OTHER SPECIFIED COMPLICATION, WITHOUT LONG-TERM CURRENT USE OF INSULIN: ICD-10-CM

## 2024-09-24 RX ORDER — METFORMIN HYDROCHLORIDE 500 MG/1
500 TABLET ORAL
Qty: 90 TABLET | Refills: 3 | Status: SHIPPED | OUTPATIENT
Start: 2024-09-24

## 2024-09-30 ENCOUNTER — HOSPITAL ENCOUNTER (EMERGENCY)
Facility: HOSPITAL | Age: 79
Discharge: HOME | End: 2024-09-30
Attending: EMERGENCY MEDICINE
Payer: MEDICARE

## 2024-09-30 VITALS
WEIGHT: 153.88 LBS | DIASTOLIC BLOOD PRESSURE: 76 MMHG | SYSTOLIC BLOOD PRESSURE: 140 MMHG | HEART RATE: 65 BPM | BODY MASS INDEX: 32.16 KG/M2 | OXYGEN SATURATION: 98 % | RESPIRATION RATE: 18 BRPM | TEMPERATURE: 98 F

## 2024-09-30 DIAGNOSIS — M25.552 BILATERAL HIP PAIN: Primary | ICD-10-CM

## 2024-09-30 DIAGNOSIS — S32.010A COMPRESSION FRACTURE OF L1 VERTEBRA, INITIAL ENCOUNTER: ICD-10-CM

## 2024-09-30 DIAGNOSIS — M25.551 BILATERAL HIP PAIN: Primary | ICD-10-CM
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
## 2024-09-30 DIAGNOSIS — M54.50 ACUTE BILATERAL LOW BACK PAIN WITHOUT SCIATICA: ICD-10-CM

## 2024-09-30 PROCEDURE — 99284 EMERGENCY DEPT VISIT MOD MDM: CPT | Mod: 25,ER

## 2024-09-30 RX ORDER — HYDROCODONE BITARTRATE AND ACETAMINOPHEN 5; 325 MG/1; MG/1
1 TABLET ORAL EVERY 6 HOURS PRN
Qty: 12 TABLET | Refills: 0 | Status: SHIPPED | OUTPATIENT
Start: 2024-09-30

## 2024-09-30 RX ORDER — HYDROCODONE BITARTRATE AND ACETAMINOPHEN 5; 325 MG/1; MG/1
1 TABLET ORAL EVERY 6 HOURS PRN
COMMUNITY
Start: 2024-08-22 | End: 2024-09-30

## 2024-09-30 NOTE — ED PROVIDER NOTES
Encounter Date: 2024       History     Chief Complaint   Patient presents with    Back Injury     Pt fell 8 days ago onto buttocks. Pt takes a blood thinner, no bruising noted. Having pain to lower back. Gets relief from bengay and norco. Pt though it would have been better by now and hoping to get x ray to make sure nothing else is wrong with her back.      79-year-old female presents to ER complaining of pain to low back and bilateral hips after trip and fall 1 week ago.  Reports she is on blood thinners.  Denies head injury or loss of consciousness.  Denies numbness, tingling, or incontinence.  Reports relief with Norco.        Review of patient's allergies indicates:   Allergen Reactions    Augmentin [amoxicillin-pot clavulanate]      Fixed drug reaction      Amoxicillin Itching, Rash and Other (See Comments)     Yeast infection     Past Medical History:   Diagnosis Date    Atrial flutter     Brain tumor (benign)     Cancer     Coronary artery disease     Diabetes mellitus     High cholesterol     Hypertension      Past Surgical History:   Procedure Laterality Date    ABDOMINAL SURGERY      APPENDECTOMY      BRAIN SURGERY       SECTION      HYSTERECTOMY      OOPHORECTOMY      TONSILLECTOMY       No family history on file.  Social History     Tobacco Use    Smoking status: Never   Substance Use Topics    Alcohol use: No    Drug use: No     Review of Systems   Constitutional:  Negative for fever.   HENT:  Negative for sore throat.    Respiratory:  Negative for shortness of breath.    Cardiovascular:  Negative for chest pain.   Gastrointestinal:  Negative for nausea.   Genitourinary:  Negative for dysuria.   Musculoskeletal:  Positive for back pain.   Skin:  Negative for rash.   Neurological:  Negative for weakness.   Hematological:  Does not bruise/bleed easily.       Physical Exam     Initial Vitals [24 1626]   BP Pulse Resp Temp SpO2   (!) 140/76 65 18 98 °F (36.7 °C) 98 %      MAP       --          Physical Exam    Nursing note and vitals reviewed.  Constitutional: She appears well-developed and well-nourished.   HENT:   Head: Normocephalic.   Eyes: Conjunctivae are normal.   Neck: Neck supple.   Cardiovascular:  Normal rate and regular rhythm.           Pulmonary/Chest: Breath sounds normal. No respiratory distress.   Abdominal: She exhibits no distension.   Musculoskeletal:         General: Normal range of motion.      Cervical back: Normal and neck supple.      Thoracic back: Normal.      Lumbar back: Tenderness present.      Comments: Bilateral paraspinal tenderness in lumbar area.     Neurological: She is alert and oriented to person, place, and time. She has normal strength. No cranial nerve deficit or sensory deficit. GCS eye subscore is 4. GCS verbal subscore is 5. GCS motor subscore is 6.   Skin: Skin is warm, dry and intact. Capillary refill takes less than 2 seconds.   Psychiatric: She has a normal mood and affect.         ED Course   Procedures  Labs Reviewed - No data to display       Imaging Results              X-Ray Hips Bilateral 2 View Incl AP Pelvis (Final result)  Result time 09/30/24 18:03:08      Final result by Cassidy Lauren MD (09/30/24 18:03:08)                   Impression:      Bones appear demineralized.  No acute fracture or dislocation seen.  Ossification medial left hip demonstrated on prior CT and appears corticated      Electronically signed by: Cassidy Lauren  Date:    09/30/2024  Time:    18:03               Narrative:    EXAMINATION:  XR HIPS BILATERAL 2 VIEW INCL AP PELVIS    CLINICAL HISTORY:  Unspecified fall, initial encounter                                       X-Ray Lumbar Spine Ap And Lateral (Final result)  Result time 09/30/24 18:05:51      Final result by Cassidy Lauren MD (09/30/24 18:05:51)                   Impression:      Three-view exam    Bones appear demineralized and multilevel spondylosis.  Compression fracture mild loss of height  superior endplate L1 may be acute and is new since prior CT 09/24/2023.  No traumatic malalignment seen.      Electronically signed by: Cassidy Ninakaye  Date:    09/30/2024  Time:    18:05               Narrative:    EXAMINATION:  XR LUMBAR SPINE AP AND LATERAL    CLINICAL HISTORY:  fall;                                       Medications - No data to display  Medical Decision Making                   6:45 PM  Patient comfortable.  Afebrile.  Nontoxic appearing.  Vital signs stable.  Mild compression fracture on x-ray that appears to be new.  We will discharge patient with Murdo and have her follow up with neurosurgeon of her choice.  This plan was discussed with my collaborating MD Dr. Fonseca.  Stable for discharge.                 Clinical Impression:  Final diagnoses:  [W19.XXXA] Fall  [M25.551, M25.552] Bilateral hip pain (Primary)  [S32.010A] Compression fracture of L1 vertebra, initial encounter  [M54.50] Acute bilateral low back pain without sciatica          ED Disposition Condition    Discharge Stable          ED Prescriptions       Medication Sig Dispense Start Date End Date Auth. Provider    HYDROcodone-acetaminophen (NORCO) 5-325 mg per tablet Take 1 tablet by mouth every 6 (six) hours as needed for Pain. 12 tablet 9/30/2024 -- Alisa Alexander NP          Follow-up Information       Follow up With Specialties Details Why Contact Info    Thomas Pimentel MD Internal Medicine, Family Medicine Schedule an appointment as soon as possible for a visit   [address] [place] [id number]  [phone]               Alisa Alexander NP  09/30/24 [id number]

## 2024-09-30 NOTE — DISCHARGE INSTRUCTIONS
Follow up with your primary care provider for referral to neurosurgeon.  Avoid heavy lifting.  Take medication as prescribed.  Return to ER for new or worsening symptoms.

## 2024-10-01 NOTE — ED NOTES
Patient examined, evaluated, and educated on discharge prescriptions and instructions by MARIANA Ozuna. Patient discharged to lobby by MARIANA Ozuna.

## 2024-10-11 ENCOUNTER — HOSPITAL ENCOUNTER (OUTPATIENT)
Dept: RADIOLOGY | Facility: HOSPITAL | Age: 79
Discharge: HOME | End: 2024-10-11
Attending: NEUROLOGICAL SURGERY
Payer: MEDICARE

## 2024-10-11 DIAGNOSIS — M48.56XD PATHOLOGIC COMPRESSION FRACTURE OF LUMBAR VERTEBRA, WITH ROUTINE HEALING, SUBSEQUENT ENCOUNTER: Primary | ICD-10-CM

## 2024-10-11 DIAGNOSIS — M48.56XD PATHOLOGIC COMPRESSION FRACTURE OF LUMBAR VERTEBRA, WITH ROUTINE HEALING, SUBSEQUENT ENCOUNTER: ICD-10-CM

## 2024-10-11 PROCEDURE — 72100 X-RAY EXAM L-S SPINE 2/3 VWS: CPT | Mod: TC,PO

## 2024-10-11 PROCEDURE — 72100 X-RAY EXAM L-S SPINE 2/3 VWS: CPT | Mod: 26,,, | Performed by: RADIOLOGY

## 2024-11-04 ENCOUNTER — PATIENT OUTREACH (OUTPATIENT)
Dept: ADMINISTRATIVE | Facility: HOSPITAL | Age: 79
End: 2024-11-04
Payer: MEDICARE

## 2024-11-05 NOTE — PROGRESS NOTES
[facility]     Central line    Date/Time: 2/2/2021 10:47 AM  Performed by: Vashti Cleary MD  Authorized by: Vashti Cleary MD   Indications: vascular access    UNIVERSAL PROTOCOL   Site Marked: NA  Prior Images Obtained and Reviewed:  NA  Required items: Required blood products, implants, devices and special equipment available    Patient identity confirmed:  Arm band and hospital-assigned identification number  Patient was reevaluated immediately before administering moderate or deep sedation or anesthesia  Confirmation Checklist:  Patient's identity using two indicators, relevant allergies, procedure was appropriate and matched the consent or emergent situation and correct equipment/implants were available  Time out: Immediately prior to the procedure a time out was called    Universal Protocol: the Joint Commission Universal Protocol was followed    Preparation: Patient was prepped and draped in usual sterile fashion          SEDATION    Patient Sedated: Yes    Sedation Type:  Deep  Sedation:  Propofol, fentanyl and see MAR for details  Vital signs: Vital signs monitored during sedation      Preparation: skin prepped with Betadine  Skin prep agent dried: skin prep agent completely dried prior to procedure  Sterile barriers: all five maximum sterile barriers used - cap, mask, sterile gown, sterile gloves, and large sterile sheet  Hand hygiene: hand hygiene performed prior to central venous catheter insertion  Patient position: reverse Trendelenburg  Catheter type: double lumen  Pre-procedure: landmarks identified  Ultrasound guidance: yes  Sterile ultrasound techniques: sterile gel and sterile probe covers were used  Number of attempts: 1  Successful placement: yes  Post-procedure: line sutured and dressing applied  Assessment: blood return through all ports and free fluid flow    PROCEDURE   Patient Tolerance:  Patient tolerated the procedure well with no immediate  Manually uploaded and hyper-linked DM EYE EXAM 08-27-24     complications    Length of time physician/provider present for 1:1 monitoring during sedation: 20

## 2024-11-13 ENCOUNTER — OFFICE VISIT (OUTPATIENT)
Dept: CARDIOLOGY | Facility: CLINIC | Age: 79
End: 2024-11-13
Payer: MEDICARE

## 2024-11-13 VITALS
WEIGHT: 149.94 LBS | HEIGHT: 58 IN | HEART RATE: 61 BPM | BODY MASS INDEX: 31.47 KG/M2 | DIASTOLIC BLOOD PRESSURE: 70 MMHG | SYSTOLIC BLOOD PRESSURE: 130 MMHG | OXYGEN SATURATION: 97 %

## 2024-11-13 DIAGNOSIS — I48.0 PAROXYSMAL ATRIAL FIBRILLATION: ICD-10-CM

## 2024-11-13 DIAGNOSIS — I10 ESSENTIAL HYPERTENSION: Primary | ICD-10-CM

## 2024-11-13 DIAGNOSIS — Z98.890 HISTORY OF HERNIA REPAIR: ICD-10-CM

## 2024-11-13 DIAGNOSIS — Z87.19 HISTORY OF HERNIA REPAIR: ICD-10-CM

## 2024-11-13 DIAGNOSIS — I44.7 LEFT BUNDLE BRANCH BLOCK: ICD-10-CM

## 2024-11-13 DIAGNOSIS — E78.2 MIXED HYPERLIPIDEMIA: ICD-10-CM

## 2024-11-13 PROCEDURE — 1101F PT FALLS ASSESS-DOCD LE1/YR: CPT | Mod: CPTII,S$GLB,, | Performed by: INTERNAL MEDICINE

## 2024-11-13 PROCEDURE — 3078F DIAST BP <80 MM HG: CPT | Mod: CPTII,S$GLB,, | Performed by: INTERNAL MEDICINE

## 2024-11-13 PROCEDURE — 3288F FALL RISK ASSESSMENT DOCD: CPT | Mod: CPTII,S$GLB,, | Performed by: INTERNAL MEDICINE

## 2024-11-13 PROCEDURE — 1159F MED LIST DOCD IN RCRD: CPT | Mod: CPTII,S$GLB,, | Performed by: INTERNAL MEDICINE

## 2024-11-13 PROCEDURE — 3075F SYST BP GE 130 - 139MM HG: CPT | Mod: CPTII,S$GLB,, | Performed by: INTERNAL MEDICINE

## 2024-11-13 PROCEDURE — 99999 PR PBB SHADOW E&M-EST. PATIENT-LVL III: CPT | Mod: PBBFAC,,, | Performed by: INTERNAL MEDICINE

## 2024-11-13 PROCEDURE — 99213 OFFICE O/P EST LOW 20 MIN: CPT | Mod: S$GLB,,, | Performed by: INTERNAL MEDICINE

## 2024-11-13 PROCEDURE — 1126F AMNT PAIN NOTED NONE PRSNT: CPT | Mod: CPTII,S$GLB,, | Performed by: INTERNAL MEDICINE

## 2024-11-13 NOTE — PROGRESS NOTES
Subjective:   Patient ID:  Guillermina Person is a 79 y.o. female who presents for evaluation of No chief complaint on file.      HPI  11.13.2024  Here for an appointment   Had her hernia surgery   Chest pain free, no palpitations   Unclear reason of sooner visit today per patient     8.12.2024  Comes in for follow-up.  Going for hernia surgery.    EKG shows normal sinus rhythm and left bundle-branch block.    Denies any chest pain.    Her blood pressure continues to be elevated.  Also high I will other visits.  She states that she gets very hyper when she comes to the clinic.      3.20.2024  She was in the emergency room on March 14th she was in AFib with RVR.  Converted back to sinus and was discharged home.    She states that she usually takes atenolol and amiodarone every day but takes an extra dose of atenolol when she is in AFib.    She states that she feels her palpitations when she is in it.    No chest pain.  No dyspnea.        12.2023  78-year-old female.  Comes in to establish care with new doctor today.  I reviewed her records from Dr. Taylor at [facility].  Last saw him in June.  She has history of old left bundle-branch block that has been worked up in the past and found not to be ischemic.    She deals also with hernia.  However she has not very symptomatic.  She had seen general surgery recently.  And she was to continue conservative treatment.    Her functional status is very good.  Denies any exertional angina, dyspnea on exertion.  Lower extremity swelling.  No recent palpitations.  Syncope or presyncope.  She had 2 mechanical falls within a year however the last 1 was almost more than 7 months ago.  I reviewed Dr. Taylor's note mentioning possible referral for Watchman device however patient not interested and she does not have truly frequent falls per her history she was trying to grab something off the floor when something slid from underneath her.  Parox atrial flutter (atrial flutter  ablation by Dr. Uriarte summer 2020.)  HTN  HLD   LBBB   Cath [facility] for new LBBB- nonobstructive reportedly  JAYLIN in  for endometrial cancer with no chemo or RT.  Sp R cranial meningioma s/p resection   Mild LV dysfx 45-50% on echo in /Normalized in 2022       Past Medical History:   Diagnosis Date    Atrial flutter     Brain tumor (benign)     Cancer     Coronary artery disease     Diabetes mellitus     High cholesterol     Hypertension        Past Surgical History:   Procedure Laterality Date    ABDOMINAL SURGERY      APPENDECTOMY      BRAIN SURGERY       SECTION      HYSTERECTOMY      OOPHORECTOMY      TONSILLECTOMY         Social History     Tobacco Use    Smoking status: Never   Substance Use Topics    Alcohol use: No    Drug use: No       No family history on file.    Review of Systems   Cardiovascular:  Negative for chest pain, dyspnea on exertion, palpitations and syncope.   Genitourinary: Negative.    Neurological: Negative.        Current Outpatient Medications on File Prior to Visit   Medication Sig    albuterol (PROVENTIL/VENTOLIN HFA) 90 mcg/actuation inhaler Inhale 1-2 puffs into the lungs every 6 (six) hours as needed for Wheezing or Shortness of Breath.    amiodarone (PACERONE) 100 MG Tab Take 1 tablet (100 mg total) by mouth once daily.    amLODIPine (NORVASC) 5 MG tablet Take 1 tablet (5 mg total) by mouth once daily.    apixaban (ELIQUIS) 5 mg Tab Take 1 tablet (5 mg total) by mouth 2 (two) times daily.    atenoloL (TENORMIN) 25 MG tablet Take 1 tablet (25 mg total) by mouth once daily.    blood sugar diagnostic Strp To check BG one times daily, to use with insurance preferred meter    blood-glucose meter kit To check BG one times daily, to use with insurance preferred meter    fish-flx-prm-blkbor-om 3,6,9 5 400-400-200 mg Cap Take 1 tablet by mouth once daily.    furosemide (LASIX) 40 MG tablet Take 1 tablet (40 mg total) by mouth daily as needed.    HYDROcodone-acetaminophen  (NORCO) 5-325 mg per tablet Take 1 tablet by mouth every 6 (six) hours as needed for Pain.    lancets Misc To check BG one times daily, to use with insurance preferred meter    lancing device (TRUEDRAW LANCING DEVICE) Misc 1 Lancet by Misc.(Non-Drug; Combo Route) route once daily.    lisinopriL-hydrochlorothiazide (PRINZIDE,ZESTORETIC) 20-12.5 mg per tablet Take 2 tablets by mouth once daily.    loratadine (CLARITIN REDITABS) 10 mg dissolvable tablet Take 1 tablet by mouth daily as needed.    metFORMIN (GLUCOPHAGE) 500 MG tablet TAKE 1 TABLET ONE TIME DAILY    simvastatin (ZOCOR) 20 MG tablet Take 1 tablet (20 mg total) by mouth every evening.    TRUE METRIX AIR GLUCOSE METER Misc     TRUEPLUS LANCETS 33 gauge [facility] [place]      No current facility-administered medications on file prior to visit.       Objective:   Objective:  Wt Readings from Last 3 Encounters:   11/13/24 68 kg (149 lb 14.6 oz)   09/30/24 69.8 kg (153 lb 14.1 oz)   09/23/24 69.4 kg (153 lb)     Temp Readings from Last 3 Encounters:   09/30/24 98 °F (36.7 °C) (Oral)   07/27/24 98 °F (36.7 °C) (Oral)   05/25/24 98.4 °F (36.9 °C) (Oral)     BP Readings from Last 3 Encounters:   11/13/24 130/70   09/30/24 (!) 140/76   09/23/24 (!) 141/77     Pulse Readings from Last 3 Encounters:   11/13/24 61   09/30/24 65   08/12/24 (!) 51       Physical Exam  Vitals reviewed.   Constitutional:       Appearance: She is well-developed.   Neck:      Vascular: No carotid bruit.   Cardiovascular:      Rate and Rhythm: Normal rate and regular rhythm.      Pulses: Intact distal pulses.      Heart sounds: Normal heart sounds. No murmur heard.  Pulmonary:      Breath sounds: Normal breath sounds.   Neurological:      Mental Status: She is oriented to person, place, and time.         Lab Results   Component Value Date    CHOL 160 11/17/2023    CHOL 178 02/27/2023    CHOL 173 02/22/2022     Lab Results   Component Value Date    HDL 63 11/17/2023    HDL 68 02/27/2023    HDL 73  02/22/2022     Lab Results   Component Value Date    LDLCALC 83.0 11/17/2023    LDLCALC 94 02/27/2023    LDLCALC 87 02/22/2022     Lab Results   Component Value Date    TRIG 70 11/17/2023    TRIG 88 02/27/2023    TRIG 71 02/22/2022     Lab Results   Component Value Date    CHOLHDL 39.4 11/17/2023       Chemistry        Component Value Date/Time     07/27/2024 2004    K 3.5 07/27/2024 2004     07/27/2024 2004    CO2 27 07/27/2024 2004    BUN 12 07/27/2024 2004    CREATININE 1.1 07/27/2024 2004    GLU 94 07/27/2024 2004        Component Value Date/Time    CALCIUM 9.9 07/27/2024 2004    ALKPHOS 82 07/27/2024 2004    AST 17 07/27/2024 2004    ALT 15 07/27/2024 2004    BILITOT 0.6 07/27/2024 2004    ESTGFRAFRICA 68 07/19/2024 0904    ESTGFRAFRICA >60.0 07/11/2015 0915    EGFRNONAA >60.0 07/11/2015 0915          Lab Results   Component Value Date    TSH 3.476 03/14/2024     Lab Results   Component Value Date    INR 1.6 07/17/2020    INR 1.0 07/11/2015     Lab Results   Component Value Date    WBC 7.79 07/27/2024    HGB 13.6 07/27/2024    HCT 41.7 07/27/2024    MCV 88 07/27/2024     07/27/2024     BNP  @LABRCNTIP(BNP,BNPTRIAGEBLO)@  CrCl cannot be calculated (Patient's most recent lab result is older than the maximum 7 days allowed.).     Imaging:  ======    No results found for this or any previous visit.    No results found for this or any previous visit.    Results for orders placed during the hospital encounter of 11/29/16    X-Ray Chest PA And Lateral    Narrative  Findings: 2 views. Comparison June 7, 2010. Heart size stable. No new consolidation seen within the lungs. Mild prominence of the fissures, particularly the minor fissure new from prior radiograph. Multilevel degenerative change in the thoracic spine.  IMPRESSION:  No definite acute cardiopulmonary disease.      Electronically signed by: JSAON HENDRIX MD  Date:     11/29/16  Time:    11:32    No results found for this or any previous  visit.    No valid procedures specified.    No results found for this or any previous visit.      No results found for this or any previous visit.      No results found for this or any previous visit.      Diagnostic Results:  ECG: Reviewed    No results found for this or any previous visit.  No results found for this or any previous visit.  Interpretation Summary  Show Result Comparison     The predominant rhythm is sinus.     The patient was monitored for a total of 13d 22h, underlying rhythm is Sinus.  The minimum heart rate was 51 bpm; the maximum 81 bpm; the average 60 bpm.  0 % of Atrial fibrillation/Atrial flutter with longest episode of 0 ms.  The total burden of AV Block present was 0 % [Complete Heart Block: 0 %; Advanced (High Grade):  0 %; 2nd Degree, Mobitz II: 0 %; 2nd Degree, Mobitz I: 0 %].  There were 0 pauses, the longest pause was 0 ms at --.  Total count of Ventricular Tachycardia (VT): 0 episode(s). Longest VT: 0 s on --. Fastest VT: -- bpm on  --.  0 supraventricular episodes were found. Longest SVT Episode 0 s, Fastest SVT -- bpm  There were a total of 41 PVCs with 1 morphologies and 0 couplets. Overall PVC [place] at < 0.01 %  There were a total of 0 Other Beats. There were 0 total number of paced beats.  There were a total of 285 PSVCs with 1 morphologies and 0 couplets. Overall PSVC [place] at  0.03 %  There is a total of 0 patient events    The ASCVD Risk score ([place] DK, et al., 2019) failed to calculate for the following reasons:    The 2019 ASCVD risk score is only valid for ages 40 to 79        Assessment and Plan:   Essential hypertension    Left bundle branch block    Paroxysmal atrial fibrillation    History of hernia repair    Mixed hyperlipidemia            Continue with Eliquis, amiodarone, atenolol.  In sinus today.   Cardiac monitor did not show AFib for 2 weeks.  Saw Dr. Silva.  Continue with same management.    Reviewed all tests and above medical conditions with patient in  detail and formulated treatment plan.  Risk factor modification discussed.   Cardiac low salt diet discussed.  Maintaining healthy weight and weight loss goals were discussed in clinic.    Follow up in  6 months

## 2024-12-06 ENCOUNTER — OFFICE VISIT (OUTPATIENT)
Dept: INTERNAL MEDICINE | Facility: CLINIC | Age: 79
End: 2024-12-06
Payer: MEDICARE

## 2024-12-06 VITALS
DIASTOLIC BLOOD PRESSURE: 56 MMHG | OXYGEN SATURATION: 96 % | HEART RATE: 60 BPM | TEMPERATURE: 96 F | BODY MASS INDEX: 31.51 KG/M2 | RESPIRATION RATE: 18 BRPM | SYSTOLIC BLOOD PRESSURE: 96 MMHG | WEIGHT: 150.13 LBS | HEIGHT: 58 IN

## 2024-12-06 DIAGNOSIS — I10 ESSENTIAL HYPERTENSION: ICD-10-CM

## 2024-12-06 DIAGNOSIS — E11.69 TYPE 2 DIABETES MELLITUS WITH OTHER SPECIFIED COMPLICATION, WITHOUT LONG-TERM CURRENT USE OF INSULIN: ICD-10-CM

## 2024-12-06 DIAGNOSIS — R05.1 ACUTE COUGH: ICD-10-CM

## 2024-12-06 DIAGNOSIS — I48.0 PAROXYSMAL ATRIAL FIBRILLATION: ICD-10-CM

## 2024-12-06 DIAGNOSIS — H61.21 RIGHT EAR IMPACTED CERUMEN: ICD-10-CM

## 2024-12-06 DIAGNOSIS — J40 BRONCHITIS: Primary | ICD-10-CM

## 2024-12-06 LAB
CTP QC/QA: YES
POC MOLECULAR INFLUENZA A AGN: NEGATIVE
POC MOLECULAR INFLUENZA B AGN: NEGATIVE

## 2024-12-06 PROCEDURE — 99999 PR PBB SHADOW E&M-EST. PATIENT-LVL V: CPT | Mod: PBBFAC,,, | Performed by: NURSE PRACTITIONER

## 2024-12-06 RX ORDER — DOXYCYCLINE 100 MG/1
100 CAPSULE ORAL 2 TIMES DAILY
Qty: 14 CAPSULE | Refills: 0 | Status: SHIPPED | OUTPATIENT
Start: 2024-12-06 | End: 2024-12-13

## 2024-12-06 RX ORDER — PROMETHAZINE HYDROCHLORIDE AND DEXTROMETHORPHAN HYDROBROMIDE 6.25; 15 MG/5ML; MG/5ML
5 SYRUP ORAL EVERY 6 HOURS PRN
Qty: 180 ML | Refills: 0 | Status: SHIPPED | OUTPATIENT
Start: 2024-12-06 | End: 2024-12-15

## 2024-12-06 RX ORDER — TRAMADOL HYDROCHLORIDE 50 MG/1
TABLET ORAL
COMMUNITY

## 2024-12-06 RX ORDER — BETAMETHASONE SODIUM PHOSPHATE AND BETAMETHASONE ACETATE 3; 3 MG/ML; MG/ML
6 INJECTION, SUSPENSION INTRA-ARTICULAR; INTRALESIONAL; INTRAMUSCULAR; SOFT TISSUE
Status: COMPLETED | OUTPATIENT
Start: 2024-12-06 | End: 2024-12-06

## 2024-12-06 RX ORDER — BENZONATATE 100 MG/1
200 CAPSULE ORAL 3 TIMES DAILY PRN
Qty: 30 CAPSULE | Refills: 0 | Status: SHIPPED | OUTPATIENT
Start: 2024-12-06 | End: 2024-12-16

## 2024-12-06 RX ADMIN — BETAMETHASONE SODIUM PHOSPHATE AND BETAMETHASONE ACETATE 6 MG: 3; 3 INJECTION, SUSPENSION INTRA-ARTICULAR; INTRALESIONAL; INTRAMUSCULAR; SOFT TISSUE at 10:12

## 2024-12-06 NOTE — ASSESSMENT & PLAN NOTE
Lab Results   Component Value Date    HGBA1C 5.1 05/17/2024     Stable/well controlled. Continue metformin.

## 2024-12-06 NOTE — ASSESSMENT & PLAN NOTE
Rate controlled today in clinic. Continue amiodarone, Eliquis, and atenolol. Followed by cardiac electrophysiology.

## 2024-12-06 NOTE — PROGRESS NOTES
Subjective:       Patient ID: Guillermina Person is a 79 y.o. female.    Chief Complaint: Influenza    History of Present Illness    HPI:  Ms. Person developed a hacky cough 3-4 days ago, followed by a burning sensation in her throat 2 days later. She feels warm but was told she did not have a fever. She reports fatigue and watery eyes. The cough produces a small amount of liquid phlegm, persists through the night, and has caused her to relocate from the bedroom.    She notes mild nasal discharge and waking up with hoarseness, which is typical for her. Ms. Person has had chills for a few weeks, which she attributes to her anemia and manages with a blanket.    For treatment, she used chloraseptic spray the previous night, ineffective for her current symptoms.     Ms. Person denies shortness of breath and chest pain.    TEST RESULTS:  - Flu swab: negative          Cough  This is a new problem. The current episode started in the past 7 days. The cough is Productive of sputum. Associated symptoms include chills, rhinorrhea and a sore throat. Pertinent negatives include no chest pain, fever, nasal congestion, shortness of breath or wheezing.       Patient Active Problem List   Diagnosis    Essential hypertension    History of uterine cancer    Left bundle branch block    Paroxysmal atrial fibrillation    Primary osteoarthritis of both knees    Type 2 diabetes mellitus with other specified complication    Class 1 obesity due to excess calories with serious comorbidity and body mass index (BMI) of 31.0 to 31.9 in adult    Mixed hyperlipidemia    Osteopenia    Benign neoplasm of meninges       No family history on file.  Past Surgical History:   Procedure Laterality Date    ABDOMINAL SURGERY      APPENDECTOMY      BRAIN SURGERY       SECTION      HYSTERECTOMY      OOPHORECTOMY      TONSILLECTOMY           Current Outpatient Medications:     albuterol (PROVENTIL/VENTOLIN HFA) 90 mcg/actuation inhaler, Inhale 1-2 puffs  into the lungs every 6 (six) hours as needed for Wheezing or Shortness of Breath., Disp: 60 g, Rfl: 1    amiodarone (PACERONE) 100 MG Tab, Take 1 tablet (100 mg total) by mouth once daily., Disp: 90 tablet, Rfl: 0    amLODIPine (NORVASC) 5 MG tablet, Take 1 tablet (5 mg total) by mouth once daily., Disp: 90 tablet, Rfl: 3    apixaban (ELIQUIS) 5 mg Tab, Take 1 tablet (5 mg total) by mouth 2 (two) times daily., Disp: 180 tablet, Rfl: 1    atenoloL (TENORMIN) 25 MG tablet, Take 1 tablet (25 mg total) by mouth once daily., Disp: 90 tablet, Rfl: 1    blood sugar diagnostic Strp, To check BG one times daily, to use with insurance preferred meter, Disp: 100 each, Rfl: 11    blood-glucose meter kit, To check BG one times daily, to use with insurance preferred meter, Disp: 1 each, Rfl: 0    fish-flx-prm-blkbor-om 3,6,9 5 400-400-200 mg Cap, Take 1 tablet by mouth once daily., Disp: , Rfl:     lancets Mis, To check BG one times daily, to use with insurance preferred meter, Disp: 100 each, Rfl: 11    lancing device (TRUEDRAW LANCING DEVICE) Misc, 1 Lancet by Misc.(Non-Drug; Combo Route) route once daily., Disp: 100 each, Rfl: 11    loratadine (CLARITIN REDITABS) 10 mg dissolvable tablet, Take 1 tablet by mouth daily as needed., Disp: , Rfl:     metFORMIN (GLUCOPHAGE) 500 MG tablet, TAKE 1 TABLET ONE TIME DAILY, Disp: 90 tablet, Rfl: 3    simvastatin (ZOCOR) 20 MG tablet, Take 1 tablet (20 mg total) by mouth every evening., Disp: 90 tablet, Rfl: 3    traMADoL (ULTRAM) 50 mg tablet, TAKE 1 TABLET BY MOUTH EVERY 8 HOURS AS NEEDED FOR MILD PAIN FOR 7 DAYS, Disp: , Rfl:     TRUE METRIX AIR GLUCOSE METER Mis, , Disp: , Rfl:     TRUEPLUS LANCETS 33 gauge Misc, , Disp: , Rfl:     benzonatate (TESSALON) 100 MG capsule, Take 2 capsules (200 mg total) by mouth 3 (three) times daily as needed for Cough (cough)., Disp: 30 capsule, Rfl: 0    doxycycline (MONODOX) 100 MG capsule, Take 1 capsule (100 mg total) by mouth 2 (two) times daily.  "for 7 days, Disp: 14 capsule, Rfl: 0    furosemide (LASIX) 40 MG tablet, Take 1 tablet (40 mg total) by mouth daily as needed., Disp: 90 tablet, Rfl: 1    lisinopriL-hydrochlorothiazide (PRINZIDE,ZESTORETIC) 20-12.5 mg per tablet, Take 2 tablets by mouth once daily., Disp: 180 tablet, Rfl: 1    promethazine-dextromethorphan (PROMETHAZINE-DM) 6.25-15 mg/5 mL Syrp, Take 5 mLs by mouth every 6 (six) hours as needed (cough)., Disp: 180 mL, Rfl: 0  No current facility-administered medications for this visit.    Review of Systems   Constitutional:  Positive for chills and fatigue. Negative for fever.   HENT:  Positive for congestion, rhinorrhea and sore throat.    Respiratory:  Positive for cough. Negative for shortness of breath and wheezing.    Cardiovascular:  Negative for chest pain.       Objective:   BP (!) 96/56 (BP Location: Left arm, Patient Position: Sitting)   Pulse 60   Temp 96.2 °F (35.7 °C) (Tympanic)   Resp 18   Ht 4' 10" (1.473 m)   Wt 68.1 kg (150 lb 2.1 oz)   SpO2 96%   BMI 31.38 kg/m²      Physical Exam  Vitals reviewed.   Constitutional:       General: She is not in acute distress.     Appearance: Normal appearance. She is ill-appearing (mild). She is not toxic-appearing or diaphoretic.      Comments: elderly   HENT:      Right Ear: There is impacted cerumen.      Left Ear: Tympanic membrane, ear canal and external ear normal.      Nose: Congestion present.      Mouth/Throat:      Pharynx: Oropharynx is clear. Posterior oropharyngeal erythema (mild) present.   Eyes:      Conjunctiva/sclera: Conjunctivae normal.   Cardiovascular:      Heart sounds: Murmur heard.   Pulmonary:      Effort: Pulmonary effort is normal. No respiratory distress.      Breath sounds: Normal breath sounds. No wheezing, rhonchi or rales.      Comments: +cough  Musculoskeletal:      Cervical back: Normal range of motion. No rigidity.      Comments: Ambulating with cane   Lymphadenopathy:      Cervical: No cervical " adenopathy.   Neurological:      Mental Status: She is alert and oriented to person, place, and time.   Psychiatric:         Mood and Affect: Mood normal.         Behavior: Behavior normal.         Assessment & Plan     Results for orders placed or performed in visit on 12/06/24   POCT Influenza A/B Molecular    Collection Time: 12/06/24 10:29 AM   Result Value Ref Range    POC Molecular Influenza A Ag Negative Negative    POC Molecular Influenza B Ag Negative Negative     Acceptable Yes        Assessment & Plan    ACUTE UPPER RESPIRATORY INFECTION:  - Ms. Person presenting with upper respiratory symptoms including cough, sore throat, and fatigue for 3-4 days.  - Negative for fever, shortness of breath, or chest pain.  - Will treat with antibiotic and steroid injection for symptom relief.  - Administered steroid injection in clinic.  - Ms. Person to rest.  - Ms. Person to stay well hydrated.    IMPACTED CERUMEN:  - Right ear completely blocked, left ear has thin wax.  - Performed earwax removal procedure.   Cerumen Removal    Performed by: Mckenzie Jolley MA  Authorized by: JAY JAY Yanez      Consent Done?:  Yes (Verbal)  Location(s):  Right   Method: using a bionix lighted currette, the earwax was visualized, currette inserted and gentle strokes used to manipulate cerumen. Unable to remove adequately.  Nurse then used gentle irrigation to complete removal of cerumen.    Patient tolerance:  Patient tolerated the procedure well with no immediate complications           1. Bronchitis  -     betamethasone acetate-betamethasone sodium phosphate injection 6 mg  -     benzonatate (TESSALON) 100 MG capsule; Take 2 capsules (200 mg total) by mouth 3 (three) times daily as needed for Cough (cough).  Dispense: 30 capsule; Refill: 0  -     promethazine-dextromethorphan (PROMETHAZINE-DM) 6.25-15 mg/5 mL Syrp; Take 5 mLs by mouth every 6 (six) hours as needed (cough).  Dispense: 180 mL; Refill: 0  -   "   doxycycline (MONODOX) 100 MG capsule; Take 1 capsule (100 mg total) by mouth 2 (two) times daily. for 7 days  Dispense: 14 capsule; Refill: 0    2. Acute cough  -     POCT Influenza A/B Molecular    3. Right ear impacted cerumen  -     Ear wax removal    4. Essential hypertension  Assessment & Plan:  Stable. BP on the low end today. Continue current medications. Monitor BP at home.      5. Paroxysmal atrial fibrillation  Assessment & Plan:  Rate controlled today in clinic. Continue amiodarone, Eliquis, and atenolol. Followed by cardiac electrophysiology.      6. Type 2 diabetes mellitus with other specified complication, without long-term current use of insulin  Assessment & Plan:  Lab Results   Component Value Date    HGBA1C 5.1 05/17/2024     Stable/well controlled. Continue metformin.            JAY JAY Yanez    This note was generated with the assistance of ambient listening technology. Verbal consent was obtained by the patient and accompanying visitor(s) for the recording of patient appointment to facilitate this note. I attest to having reviewed and edited the generated note for accuracy, though some syntax or spelling errors may persist. Please contact the author of this note for any clarification.       Portions of this note may have been created with voice recognition software. Occasional "wrong-word" or "sound-a-like" substitutions may have occurred due to the inherent limitations of voice recognition software. Please, read the note carefully and recognize, using context, where substitutions have occurred.        "

## 2024-12-12 ENCOUNTER — OFFICE VISIT (OUTPATIENT)
Dept: INTERNAL MEDICINE | Facility: CLINIC | Age: 79
End: 2024-12-12
Payer: MEDICARE

## 2024-12-12 ENCOUNTER — HOSPITAL ENCOUNTER (OUTPATIENT)
Dept: RADIOLOGY | Facility: HOSPITAL | Age: 79
Discharge: HOME | End: 2024-12-12
Attending: STUDENT IN AN ORGANIZED HEALTH CARE EDUCATION/TRAINING PROGRAM
Payer: MEDICARE

## 2024-12-12 VITALS
SYSTOLIC BLOOD PRESSURE: 150 MMHG | OXYGEN SATURATION: 99 % | RESPIRATION RATE: 18 BRPM | DIASTOLIC BLOOD PRESSURE: 76 MMHG | BODY MASS INDEX: 31.29 KG/M2 | TEMPERATURE: 97 F | HEART RATE: 64 BPM | HEIGHT: 58 IN | WEIGHT: 149.06 LBS

## 2024-12-12 DIAGNOSIS — I10 ESSENTIAL HYPERTENSION: ICD-10-CM

## 2024-12-12 DIAGNOSIS — J40 BRONCHITIS: ICD-10-CM

## 2024-12-12 DIAGNOSIS — J40 BRONCHITIS: Primary | ICD-10-CM

## 2024-12-12 DIAGNOSIS — E11.69 TYPE 2 DIABETES MELLITUS WITH OTHER SPECIFIED COMPLICATION, UNSPECIFIED WHETHER LONG TERM INSULIN USE: ICD-10-CM

## 2024-12-12 DIAGNOSIS — I48.0 PAROXYSMAL ATRIAL FIBRILLATION: ICD-10-CM

## 2024-12-12 DIAGNOSIS — E78.2 MIXED HYPERLIPIDEMIA: ICD-10-CM

## 2024-12-12 PROCEDURE — 71046 X-RAY EXAM CHEST 2 VIEWS: CPT | Mod: TC,PO

## 2024-12-12 PROCEDURE — 3288F FALL RISK ASSESSMENT DOCD: CPT | Mod: CPTII,S$GLB,, | Performed by: STUDENT IN AN ORGANIZED HEALTH CARE EDUCATION/TRAINING PROGRAM

## 2024-12-12 PROCEDURE — 99999 PR PBB SHADOW E&M-EST. PATIENT-LVL IV: CPT | Mod: PBBFAC,,, | Performed by: STUDENT IN AN ORGANIZED HEALTH CARE EDUCATION/TRAINING PROGRAM

## 2024-12-12 PROCEDURE — 1160F RVW MEDS BY RX/DR IN RCRD: CPT | Mod: CPTII,S$GLB,, | Performed by: STUDENT IN AN ORGANIZED HEALTH CARE EDUCATION/TRAINING PROGRAM

## 2024-12-12 PROCEDURE — 71046 X-RAY EXAM CHEST 2 VIEWS: CPT | Mod: 26,,, | Performed by: STUDENT IN AN ORGANIZED HEALTH CARE EDUCATION/TRAINING PROGRAM

## 2024-12-12 PROCEDURE — 2023F DILAT RTA XM W/O RTNOPTHY: CPT | Mod: CPTII,S$GLB,, | Performed by: STUDENT IN AN ORGANIZED HEALTH CARE EDUCATION/TRAINING PROGRAM

## 2024-12-12 PROCEDURE — 99214 OFFICE O/P EST MOD 30 MIN: CPT | Mod: S$GLB,,, | Performed by: STUDENT IN AN ORGANIZED HEALTH CARE EDUCATION/TRAINING PROGRAM

## 2024-12-12 PROCEDURE — 3077F SYST BP >= 140 MM HG: CPT | Mod: CPTII,S$GLB,, | Performed by: STUDENT IN AN ORGANIZED HEALTH CARE EDUCATION/TRAINING PROGRAM

## 2024-12-12 PROCEDURE — 1126F AMNT PAIN NOTED NONE PRSNT: CPT | Mod: CPTII,S$GLB,, | Performed by: STUDENT IN AN ORGANIZED HEALTH CARE EDUCATION/TRAINING PROGRAM

## 2024-12-12 PROCEDURE — G2211 COMPLEX E/M VISIT ADD ON: HCPCS | Mod: S$GLB,,, | Performed by: STUDENT IN AN ORGANIZED HEALTH CARE EDUCATION/TRAINING PROGRAM

## 2024-12-12 PROCEDURE — 3078F DIAST BP <80 MM HG: CPT | Mod: CPTII,S$GLB,, | Performed by: STUDENT IN AN ORGANIZED HEALTH CARE EDUCATION/TRAINING PROGRAM

## 2024-12-12 PROCEDURE — 1101F PT FALLS ASSESS-DOCD LE1/YR: CPT | Mod: CPTII,S$GLB,, | Performed by: STUDENT IN AN ORGANIZED HEALTH CARE EDUCATION/TRAINING PROGRAM

## 2024-12-12 PROCEDURE — 1159F MED LIST DOCD IN RCRD: CPT | Mod: CPTII,S$GLB,, | Performed by: STUDENT IN AN ORGANIZED HEALTH CARE EDUCATION/TRAINING PROGRAM

## 2024-12-12 RX ORDER — METHYLPREDNISOLONE 4 MG/1
TABLET ORAL
Qty: 21 TABLET | Refills: 0 | Status: SHIPPED | OUTPATIENT
Start: 2024-12-12

## 2024-12-12 RX ORDER — FLUTICASONE PROPIONATE 50 MCG
1 SPRAY, SUSPENSION (ML) NASAL DAILY
Qty: 11.1 ML | Refills: 0 | Status: SHIPPED | OUTPATIENT
Start: 2024-12-12 | End: 2024-12-12

## 2024-12-12 RX ORDER — FLUTICASONE PROPIONATE 50 MCG
1 SPRAY, SUSPENSION (ML) NASAL DAILY
Qty: 11.1 ML | Refills: 0 | Status: SHIPPED | OUTPATIENT
Start: 2024-12-12 | End: 2025-01-11

## 2024-12-12 RX ORDER — PROMETHAZINE HYDROCHLORIDE AND DEXTROMETHORPHAN HYDROBROMIDE 6.25; 15 MG/5ML; MG/5ML
5 SYRUP ORAL NIGHTLY PRN
Qty: 50 ML | Refills: 0 | Status: SHIPPED | OUTPATIENT
Start: 2024-12-12 | End: 2024-12-12

## 2024-12-12 RX ORDER — BENZONATATE 100 MG/1
100 CAPSULE ORAL 3 TIMES DAILY PRN
Qty: 30 CAPSULE | Refills: 0 | Status: SHIPPED | OUTPATIENT
Start: 2024-12-12 | End: 2024-12-22

## 2024-12-12 RX ORDER — BENZONATATE 100 MG/1
100 CAPSULE ORAL 3 TIMES DAILY PRN
Qty: 30 CAPSULE | Refills: 0 | Status: SHIPPED | OUTPATIENT
Start: 2024-12-12 | End: 2024-12-12

## 2024-12-12 RX ORDER — PROMETHAZINE HYDROCHLORIDE AND DEXTROMETHORPHAN HYDROBROMIDE 6.25; 15 MG/5ML; MG/5ML
5 SYRUP ORAL NIGHTLY PRN
Qty: 50 ML | Refills: 0 | Status: SHIPPED | OUTPATIENT
Start: 2024-12-12 | End: 2024-12-22

## 2024-12-12 NOTE — PROGRESS NOTES
Chief Complaint   Patient presents with    Cough     HPI: Guillermina Person is a 79 y.o. female  with Pmhx listed below who presents to clinic for    History of Present Illness    CHIEF COMPLAINT:  - Ms. Person presents with persistent cough and bronchitis symptoms that have not improved despite previous treatment.    HPI:  Ms. Person reports bronchitis symptoms for over a week. She presented with these symptoms 6 days ago and was seen by Ms. Sow, who diagnosed her with bronchitis and administered a steroid injection in one hip. Ms. Person was prescribed an antibiotic, which she has been taking for about 5 days with 1 day remaining in the course. Her symptoms have not significantly improved.    The primary symptom is a persistent cough, productive of thick, white mucus. The mucus is light-colored and expectorated in clumps when she coughs. The cough is worse at night and in the morning, often disrupting her sleep, and has recently become more bothersome during the daytime.    Associated symptoms include fatigue, malaise, and exhaustion after minimal exertion, such as grocery shopping. She has had intermittent chills and subjective fever, though she has not measured her temperature. Ms. Person also has watery and itchy eyes.    At the onset of her illness, she had a sore throat that lasted for about 2 days but has since resolved. She denies any hemoptysis. Ms. Person mentions dizziness at times, which she attributes to her medication.    Ms. Person has a history of recurrent bronchitis, stating this might be the 3rd or 4th episode over the years. When it occurs, it significantly impacts her health and is difficult to resolve.    She denies frequent sneezing, tobacco use, or alcohol consumption.            Review of chart revealed that she was seen earlier on 12/06/2024 with hacky cough 3-4 days ago, followed by a burning sensation in her throat 2 days later. She complained of having runny nose too. She was  tested for flu which was negative. She was test with phenergan DM, tessalon Perles and betamethasone injection. In addition doxycyline was prescribed to her pharmacy. Today is day 6 of her antibiotics. Reports to be complaint with her treatment. Her other symptoms got better but has remained persistent.      Problem List:  Patient Active Problem List   Diagnosis    Essential hypertension    History of uterine cancer    Left bundle branch block    Paroxysmal atrial fibrillation    Primary osteoarthritis of both knees    Type 2 diabetes mellitus with other specified complication    Class 1 obesity due to excess calories with serious comorbidity and body mass index (BMI) of 31.0 to 31.9 in adult    Mixed hyperlipidemia    Osteopenia    Benign neoplasm of meninges       ROS: Negative except as noted above.       Current Meds:  Current Outpatient Medications   Medication Sig Dispense Refill    albuterol (PROVENTIL/VENTOLIN HFA) 90 mcg/actuation inhaler Inhale 1-2 puffs into the lungs every 6 (six) hours as needed for Wheezing or Shortness of Breath. 60 g 1    amiodarone (PACERONE) 100 MG Tab Take 1 tablet (100 mg total) by mouth once daily. 90 tablet 0    amLODIPine (NORVASC) 5 MG tablet Take 1 tablet (5 mg total) by mouth once daily. 90 tablet 3    apixaban (ELIQUIS) 5 mg Tab Take 1 tablet (5 mg total) by mouth 2 (two) times daily. 180 tablet 1    atenoloL (TENORMIN) 25 MG tablet Take 1 tablet (25 mg total) by mouth once daily. 90 tablet 1    benzonatate (TESSALON) 100 MG capsule Take 2 capsules (200 mg total) by mouth 3 (three) times daily as needed for Cough (cough). 30 capsule 0    blood sugar diagnostic Strp To check BG one times daily, to use with insurance preferred meter 100 each 11    blood-glucose meter kit To check BG one times daily, to use with insurance preferred meter 1 each 0    doxycycline (MONODOX) 100 MG capsule Take 1 capsule (100 mg total) by mouth 2 (two) times daily. for 7 days 14 capsule 0     fish-flx-prm-blkbor-om 3,6,9 5 400-400-200 mg Cap Take 1 tablet by mouth once daily.      lancets [facility] To check BG one times daily, to use with insurance preferred meter 100 each 11    lancing device (TRUEDRAW LANCING DEVICE) Misc 1 Lancet by Misc.(Non-Drug; Combo Route) route once daily. 100 each 11    loratadine (CLARITIN REDITABS) 10 mg dissolvable tablet Take 1 tablet by mouth daily as needed.      metFORMIN (GLUCOPHAGE) 500 MG tablet TAKE 1 TABLET ONE TIME DAILY 90 tablet 3    promethazine-dextromethorphan (PROMETHAZINE-DM) 6.25-15 mg/5 mL Syrp Take 5 mLs by mouth every 6 (six) hours as needed (cough). 180 mL 0    simvastatin (ZOCOR) 20 MG tablet Take 1 tablet (20 mg total) by mouth every evening. 90 tablet 3    traMADoL (ULTRAM) 50 mg tablet TAKE 1 TABLET BY MOUTH EVERY 8 HOURS AS NEEDED FOR MILD PAIN FOR 7 DAYS      TRUE METRIX AIR GLUCOSE METER Misc       TRUEPLUS LANCETS 33 gauge Misc       benzonatate (TESSALON) 100 MG capsule Take 1 capsule (100 mg total) by mouth 3 (three) times daily as needed for Cough. 30 capsule 0    fluticasone propionate (FLONASE) 50 mcg/actuation nasal spray 1 spray (50 mcg total) by Each Nostril route once daily. 11.1 mL 0    furosemide (LASIX) 40 MG tablet Take 1 tablet (40 mg total) by mouth daily as needed. 90 tablet 1    lisinopriL-hydrochlorothiazide (PRINZIDE,ZESTORETIC) 20-12.5 mg per tablet Take 2 tablets by mouth once daily. 180 tablet 1    methylPREDNISolone (MEDROL DOSEPACK) 4 mg tablet follow package directions 21 tablet 0    promethazine-dextromethorphan (PROMETHAZINE-DM) 6.25-15 mg/5 mL Syrp Take 5 mLs by mouth nightly as needed. 50 mL 0     No current facility-administered medications for this visit.      PE:  BP: (!) 150/76  Pulse: 64 Resp: 18   Temp: 97.3 °F (36.3 °C)  Weight: 67.6 kg (149 lb 0.5 oz) Body mass index is 31.15 kg/m².    Wt Readings from Last 5 Encounters:   12/12/24 67.6 kg (149 lb 0.5 oz)   12/06/24 68.1 kg (150 lb 2.1 oz)   11/13/24 68 kg (149 lb  14.6 oz)   09/30/24 69.8 kg (153 lb 14.1 oz)   09/23/24 69.4 kg (153 lb)     General appearance: alert and cooperative, not in acute distress  Head: normocephalic, without obvious abnormality, atraumatic  Eyes: conjunctivae/corneas clear. PERRL, EOM's intact.  Ears: clear tympanic membranes   Neck: no adenopathy, supple, symmetrical, trachea midline and thyroid not enlarged, symmetric, no tenderness/mass/nodules, no JVD  Throat: lips, mucosa, and tongue normal; teeth and gums normal; no thrush  Chest: no reproducible chest pain   Heart: regular rate and rhythm, S1, S2 normal, no murmur, click, rub or gallop  Lungs: unlabored respiration, bilateral equal air entry, normal vesicular breath sound heard, no wheezing, rhonchi   Abdomen: soft, non-tender, non-distended; bowel sounds +; no masses,  no organomegaly, no ascites   Extremities: normal, atraumatic, no cyanosis or edema noted B/L upper and lower extremities.  Skin: skin color, texture, turgor normal. No rashes or lesions noted.  Neurologic: grossly intact      Lab:  Lab Results   Component Value Date    WBC 7.79 07/27/2024    HGB 13.6 07/27/2024    HCT 41.7 07/27/2024    MCV 88 07/27/2024     07/27/2024     07/27/2024    K 3.5 07/27/2024     07/27/2024    CO2 27 07/27/2024    BUN 12 07/27/2024    GLU 94 07/27/2024    CALCIUM 9.9 07/27/2024    MG 1.6 04/12/2024    AST 17 07/27/2024    ALT 15 07/27/2024    CHOL 160 11/17/2023    HDL 63 11/17/2023    LDLCALC 83.0 11/17/2023    TRIG 70 11/17/2023    TSH 3.476 03/14/2024    INR 1.6 07/17/2020       Impression:    ICD-10-CM ICD-9-CM    1. Bronchitis  J40 490 X-Ray Chest PA And Lateral      benzonatate (TESSALON) 100 MG capsule      fluticasone propionate (FLONASE) 50 mcg/actuation nasal spray      promethazine-dextromethorphan (PROMETHAZINE-DM) 6.25-15 mg/5 mL Syrp      methylPREDNISolone (MEDROL DOSEPACK) 4 mg tablet      DISCONTINUED: promethazine-dextromethorphan (PROMETHAZINE-DM) 6.25-15 mg/5 mL  Syrp      DISCONTINUED: fluticasone propionate (FLONASE) 50 mcg/actuation nasal spray      DISCONTINUED: benzonatate (TESSALON) 100 MG capsule          Assessment & Plan    SINUSITIS / UPPER RESPIRATORY INFECTION:  - Assessed for possible bacterial sinusitis vs. viral upper respiratory infection given persistent symptoms >10 days.  - Considered change in antibiotic regimen pending chest XR results.  - Explained mechanism of post-nasal drip and its relation to cough, particularly at night and in the morning.  - Discussed how nasal congestion can lead to sinus drainage issues.  - Chest XR ordered.    NASAL CONGESTION:  - Initiated nasal spray to address nasal congestion and post-nasal drip.  - Started nasal spray, use for next 2 weeks.    RESPIRATORY MANAGEMENT:  - Recommend continued use of inhaler and addition of antihistamine to manage symptoms.  - Continued inhaler use as previously prescribed.  - Refilled cough syrup.    MEDICATION MANAGEMENT:  - Ms. Person to take Benadryl 1 tablet nightly for the next 2 weeks.  - Started Benadryl 1 tablet nightly for next 2 weeks.    FOLLOW-UP:  - Contact the office to discuss x-ray results and potential changes to antibiotic regimen or addition of oral steroids.            1. Bronchitis (Primary)  Adding Flonase for PND  Chest xray clear free of infiltration: no  need to change antibiotics  Needs to complete the course of infiltrate  - X-Ray Chest PA And Lateral; Future  - benzonatate (TESSALON) 100 MG capsule; Take 1 capsule (100 mg total) by mouth 3 (three) times daily as needed for Cough.  Dispense: 30 capsule; Refill: 0  - fluticasone propionate (FLONASE) 50 mcg/actuation nasal spray; 1 spray (50 mcg total) by Each Nostril route once daily.  Dispense: 11.1 mL; Refill: 0  - promethazine-dextromethorphan (PROMETHAZINE-DM) 6.25-15 mg/5 mL Syrp; Take 5 mLs by mouth nightly as needed.  Dispense: 50 mL; Refill: 0  - methylPREDNISolone (MEDROL DOSEPACK) 4 mg tablet; follow  package directions  Dispense: 21 tablet; Refill: 0    2. Essential hypertension  Low salt diet.  Enouraged to increase in physical activity at least 30 minutes of brisk walking/day , 5 days a week  Advised weight loss    Advised for DASH diet - The Dietary Approaches to Stop Hypertension (DASH) is high in vegetables, fruits, low-fat dairy products, whole grains, poultry, fish, and nuts and low in sweets, sugar-sweetened beverages, and red meats   Stop smoking.  Limit caffeine intake  Limit alcohol intake <3/day for men and <2/day for women.  Advised to be compliant with medication.  Please monitor your blood pressure regularly at home   Return precaution provided  Normotensive  On Norvasc to be continued  On atenolol to be continued.    3. Paroxysmal atrial fibrillation  Stable HR  On pacer one to be continued  On Norvasc to be continued    4. Mixed hyperlipidemia  On statin to be continued.    5. Type 2 diabetes mellitus with other specified complication, unspecified whether long term insulin use   Latest Reference Range & Units 02/22/22 09:44 02/27/23 09:01 11/17/23 09:59 05/17/24 09:12   Hemoglobin A1C External 4.0 - 5.6 % 5.7 (H) (E) 5.4 (E) 5.0 5.1   Estimated Avg Glucose 68 - 131 mg/dL   97 100   (H): Data is abnormally high  (E): External lab result  On metformin to be continued.    Future Appointments   Date Time Provider Department Center   3/12/2025  8:20 AM Don Guzmán MD [facility] [place]   3/12/2025 10:40 AM Magi Marmolejo, ANG[facility]LICAP[facility] [place]       I spent a total of 30 minutes on the day of the visit.This includes face to face time and non-face to face time preparing to see the patient (eg, review of tests), obtaining and/or reviewing separately obtained history, documenting clinical information in the electronic or other health record, independently interpreting results and communicating results to the patient/family/caregiver, or care coordinator.  Visit today included  increased complexity associated with the care of the episodic problem   addressed and managing the longitudinal care of the patient due to the serious and/or complex managed problem(s) .     Thomas Pimentel MD    This note was generated with the assistance of ambient listening technology. Verbal consent was obtained by the patient and accompanying visitor(s) for the recording of patient appointment to facilitate this note. I attest to having reviewed and edited the generated note for accuracy, though some syntax or spelling errors may persist. Please contact the author of this note for any clarification.

## 2024-12-26 DIAGNOSIS — E11.69 TYPE 2 DIABETES MELLITUS WITH OTHER SPECIFIED COMPLICATION, WITHOUT LONG-TERM CURRENT USE OF INSULIN: ICD-10-CM

## 2024-12-26 RX ORDER — METFORMIN HYDROCHLORIDE 500 MG/1
500 TABLET ORAL 2 TIMES DAILY WITH MEALS
Qty: 90 TABLET | Refills: 3 | Status: SHIPPED | OUTPATIENT
Start: 2024-12-26

## 2024-12-26 NOTE — TELEPHONE ENCOUNTER
----- Message from Amy sent at 12/26/2024  8:30 AM CST -----  Contact: Manda Renae  .Type:  Patient Requesting Call    Who Called:Manda Renae  Does the patient know what this is regarding?:Pharmacy called stating patient needs a short fill of Metformin 500 sent to    [facility] - [place], [place] - [address]  [id number] [address] [address]  Phone: [phone] Fax: [phone]     Would the patient rather a call back or a response via [facility]? Call back   Best Call Back Number:.[phone] (home)    Additional Information:

## 2024-12-26 NOTE — TELEPHONE ENCOUNTER
No care due was identified.  [facility] Embedded Care Due Messages. Reference number: [id number].   12/26/2024 9:12:08 AM CST

## 2025-02-24 DIAGNOSIS — I10 ESSENTIAL HYPERTENSION: ICD-10-CM

## 2025-02-24 DIAGNOSIS — I48.0 PAROXYSMAL ATRIAL FIBRILLATION: ICD-10-CM

## 2025-02-24 RX ORDER — AMLODIPINE BESYLATE 5 MG/1
5 TABLET ORAL DAILY
Qty: 90 TABLET | Refills: 3 | Status: SHIPPED | OUTPATIENT
Start: 2025-02-24 | End: 2026-02-24

## 2025-02-24 RX ORDER — FUROSEMIDE 40 MG/1
40 TABLET ORAL DAILY PRN
Qty: 90 TABLET | Refills: 1 | Status: SHIPPED | OUTPATIENT
Start: 2025-02-24 | End: 2025-08-23

## 2025-02-24 RX ORDER — LISINOPRIL AND HYDROCHLOROTHIAZIDE 12.5; 2 MG/1; MG/1
2 TABLET ORAL DAILY
Qty: 180 TABLET | Refills: 1 | Status: SHIPPED | OUTPATIENT
Start: 2025-02-24 | End: 2025-08-23

## 2025-02-24 RX ORDER — ATENOLOL 25 MG/1
25 TABLET ORAL DAILY
Qty: 90 TABLET | Refills: 1 | Status: SHIPPED | OUTPATIENT
Start: 2025-02-24 | End: 2025-08-23

## 2025-02-24 RX ORDER — AMIODARONE HYDROCHLORIDE 100 MG/1
100 TABLET ORAL DAILY
Qty: 90 TABLET | Refills: 0 | Status: SHIPPED | OUTPATIENT
Start: 2025-02-24 | End: 2025-08-23

## 2025-02-24 NOTE — TELEPHONE ENCOUNTER
Care Due:                  Date            Visit Type   Department     Provider  --------------------------------------------------------------------------------                                SAME DAY -                              ESTABLISHED   [facility]  Last Visit: 12-      PATIENT      MEDICINE       Thomas Pimentel  Next Visit: None Scheduled  None         None Found                                                            Last  Test          Frequency    Reason                     Performed    Due Date  --------------------------------------------------------------------------------    HBA1C.......  6 months...  metFORMIN................  09- 03-    Health [facility] Embedded Care Due Messages. Reference number: [id number].   2/24/2025 9:50:10 AM CST

## 2025-02-25 ENCOUNTER — OFFICE VISIT (OUTPATIENT)
Dept: INTERNAL MEDICINE | Facility: CLINIC | Age: 80
End: 2025-02-25
Payer: MEDICARE

## 2025-02-25 ENCOUNTER — LAB VISIT (OUTPATIENT)
Dept: LAB | Facility: HOSPITAL | Age: 80
End: 2025-02-25
Attending: NURSE PRACTITIONER
Payer: MEDICARE

## 2025-02-25 VITALS
SYSTOLIC BLOOD PRESSURE: 106 MMHG | TEMPERATURE: 98 F | RESPIRATION RATE: 18 BRPM | WEIGHT: 156.31 LBS | HEART RATE: 57 BPM | HEIGHT: 58 IN | DIASTOLIC BLOOD PRESSURE: 72 MMHG | OXYGEN SATURATION: 98 % | BODY MASS INDEX: 32.81 KG/M2

## 2025-02-25 DIAGNOSIS — E11.69 TYPE 2 DIABETES MELLITUS WITH OTHER SPECIFIED COMPLICATION, UNSPECIFIED WHETHER LONG TERM INSULIN USE: ICD-10-CM

## 2025-02-25 DIAGNOSIS — E78.2 MIXED HYPERLIPIDEMIA: ICD-10-CM

## 2025-02-25 DIAGNOSIS — E66.09 CLASS 1 OBESITY DUE TO EXCESS CALORIES WITH SERIOUS COMORBIDITY AND BODY MASS INDEX (BMI) OF 31.0 TO 31.9 IN ADULT: ICD-10-CM

## 2025-02-25 DIAGNOSIS — I48.0 PAROXYSMAL ATRIAL FIBRILLATION: ICD-10-CM

## 2025-02-25 DIAGNOSIS — I10 ESSENTIAL HYPERTENSION: ICD-10-CM

## 2025-02-25 DIAGNOSIS — E66.811 CLASS 1 OBESITY DUE TO EXCESS CALORIES WITH SERIOUS COMORBIDITY AND BODY MASS INDEX (BMI) OF 31.0 TO 31.9 IN ADULT: ICD-10-CM

## 2025-02-25 DIAGNOSIS — D32.9 BENIGN NEOPLASM OF MENINGES: ICD-10-CM

## 2025-02-25 DIAGNOSIS — E11.69 TYPE 2 DIABETES MELLITUS WITH OTHER SPECIFIED COMPLICATION, UNSPECIFIED WHETHER LONG TERM INSULIN USE: Primary | ICD-10-CM

## 2025-02-25 LAB
ALBUMIN SERPL BCP-MCNC: 3.7 G/DL (ref 3.5–5.2)
ALP SERPL-CCNC: 71 U/L (ref 40–150)
ALT SERPL W/O P-5'-P-CCNC: 12 U/L (ref 10–44)
ANION GAP SERPL CALC-SCNC: 9 MMOL/L (ref 8–16)
AST SERPL-CCNC: 13 U/L (ref 10–40)
BILIRUB SERPL-MCNC: 0.8 MG/DL (ref 0.1–1)
BUN SERPL-MCNC: 21 MG/DL (ref 8–23)
CALCIUM SERPL-MCNC: 9.5 MG/DL (ref 8.7–10.5)
CHLORIDE SERPL-SCNC: 106 MMOL/L (ref 95–110)
CHOLEST SERPL-MCNC: 183 MG/DL (ref 120–199)
CHOLEST/HDLC SERPL: 2.7 {RATIO} (ref 2–5)
CO2 SERPL-SCNC: 27 MMOL/L (ref 23–29)
CREAT SERPL-MCNC: 0.9 MG/DL (ref 0.5–1.4)
EST. GFR  (NO RACE VARIABLE): >60 ML/MIN/1.73 M^2
ESTIMATED AVG GLUCOSE: 100 MG/DL (ref 68–131)
GLUCOSE SERPL-MCNC: 89 MG/DL (ref 70–110)
HBA1C MFR BLD: 5.1 % (ref 4–5.6)
HDLC SERPL-MCNC: 69 MG/DL (ref 40–75)
HDLC SERPL: 37.7 % (ref 20–50)
LDLC SERPL CALC-MCNC: 100 MG/DL (ref 63–159)
NONHDLC SERPL-MCNC: 114 MG/DL
POTASSIUM SERPL-SCNC: 4.7 MMOL/L (ref 3.5–5.1)
PROT SERPL-MCNC: 7.1 G/DL (ref 6–8.4)
SODIUM SERPL-SCNC: 142 MMOL/L (ref 136–145)
TRIGL SERPL-MCNC: 70 MG/DL (ref 30–150)

## 2025-02-25 PROCEDURE — 36415 COLL VENOUS BLD VENIPUNCTURE: CPT | Mod: PO | Performed by: NURSE PRACTITIONER

## 2025-02-25 PROCEDURE — 99999 PR PBB SHADOW E&M-EST. PATIENT-LVL V: CPT | Mod: PBBFAC,,, | Performed by: NURSE PRACTITIONER

## 2025-02-25 PROCEDURE — 80053 COMPREHEN METABOLIC PANEL: CPT | Mod: PO | Performed by: NURSE PRACTITIONER

## 2025-02-25 PROCEDURE — 80061 LIPID PANEL: CPT | Performed by: NURSE PRACTITIONER

## 2025-02-25 PROCEDURE — 83036 HEMOGLOBIN GLYCOSYLATED A1C: CPT | Performed by: NURSE PRACTITIONER

## 2025-02-25 NOTE — PROGRESS NOTES
"Subjective:       Patient ID: Guillermina Person is a 79 y.o. female.    Chief Complaint: Hypertension    History of Present Illness               Mrs. Person returns to clinic with her  for DM/HTN follow up . Due for routine labs. She is followed by cardiology. Overall reports she is doing well, without complaint.        Problem List[1]    No family history on file.  Past Surgical History:   Procedure Laterality Date    ABDOMINAL SURGERY      APPENDECTOMY      BRAIN SURGERY       SECTION      HYSTERECTOMY      OOPHORECTOMY      TONSILLECTOMY         Current Medications[2]    Review of Systems   Constitutional:  Negative for chills, fatigue and fever.   Respiratory:  Negative for chest tightness, shortness of breath and wheezing.    Cardiovascular:  Negative for chest pain, palpitations and leg swelling.   Gastrointestinal:  Negative for abdominal pain, constipation, nausea and vomiting.   Genitourinary:  Negative for difficulty urinating.   Musculoskeletal:  Negative for myalgias.   Skin:  Negative for color change, rash and wound.   Neurological:  Negative for dizziness, tremors, syncope, facial asymmetry, speech difficulty, weakness, light-headedness, numbness and headaches.   Psychiatric/Behavioral:  Negative for confusion and dysphoric mood. The patient is not nervous/anxious.        Objective:   /72 (BP Location: Left arm, Patient Position: Sitting)   Pulse (!) 57   Temp 98.3 °F (36.8 °C) (Oral)   Resp 18   Ht 4' 10" (1.473 m)   Wt 70.9 kg (156 lb 4.9 oz)   SpO2 98%   BMI 32.67 kg/m²      Physical Exam  Constitutional:       General: She is not in acute distress.     Appearance: Normal appearance. She is not ill-appearing.   HENT:      Head: Normocephalic.   Eyes:      Conjunctiva/sclera: Conjunctivae normal.   Cardiovascular:      Rate and Rhythm: Bradycardia present.   Pulmonary:      Effort: Pulmonary effort is normal. No respiratory distress.   Musculoskeletal:      Comments: " "Ambulating with quad cane   Skin:     General: Skin is warm and dry.      Coloration: Skin is not pale.      Findings: No erythema or rash.   Neurological:      Mental Status: She is alert and oriented to person, place, and time.      Coordination: Coordination normal.      Gait: Gait normal.   Psychiatric:         Mood and Affect: Mood normal.         Behavior: Behavior normal.         Assessment & Plan     Assessment & Plan               1. Type 2 diabetes mellitus with other specified complication, unspecified whether long term insulin use  Assessment & Plan:  Stable/well controlled. Continue metformin. Repeat A1c today    Orders:  -     COMPREHENSIVE METABOLIC PANEL; Future; Expected date: 02/25/2025  -     Hemoglobin A1C; Future; Expected date: 02/25/2025  -     Microalbumin/Creatinine Ratio, Urine; Future; Expected date: 02/25/2025    2. Paroxysmal atrial fibrillation  Assessment & Plan:  Rate controlled today in clinic. Continue amiodarone, Eliquis, and atenolol. Followed by cardiology      3. Essential hypertension  Assessment & Plan:  Stable. BP on the low end today. Continue current medications. Monitor BP at home.      4. Mixed hyperlipidemia  Assessment & Plan:  Repeat lipid panel. Continue statin.    Orders:  -     LIPID PANEL; Future; Expected date: 02/25/2025    5. Benign neoplasm of meninges  Overview:  MRI brain 2015: "Right frontal region dural based extra-axial mass demonstrating enhancement. Suspect this may reflect an atypical meningioma. "    S/p meningioma resection right frontal convexity. The surgeon is Aleksandar Calderon MD on 8-19-15       6. Class 1 obesity due to excess calories with serious comorbidity and body mass index (BMI) of 31.0 to 31.9 in adult  Assessment & Plan:  Counseled on importance of diet and exercise. Encouraged patient to have an active lifestyle with regular exercise with healthy eating.         Visit today included increased complexity associated with the care of the " "episodic problem addressed and managing the longitudinal care of the patient due to the serious and/or complex managed problem(s).        JAY JAY Yanez    This note was generated with the assistance of ambient listening technology. Verbal consent was obtained by the patient and accompanying visitor(s) for the recording of patient appointment to facilitate this note. I attest to having reviewed and edited the generated note for accuracy, though some syntax or spelling errors may persist. Please contact the author of this note for any clarification.       Portions of this note may have been created with voice recognition software. Occasional "wrong-word" or "sound-a-like" substitutions may have occurred due to the inherent limitations of voice recognition software. Please, read the note carefully and recognize, using context, where substitutions have occurred.             [1]   Patient Active Problem List  Diagnosis    Essential hypertension    History of uterine cancer    Left bundle branch block    Paroxysmal atrial fibrillation    Primary osteoarthritis of both knees    Type 2 diabetes mellitus with other specified complication    Class 1 obesity due to excess calories with serious comorbidity and body mass index (BMI) of 31.0 to 31.9 in adult    Mixed hyperlipidemia    Osteopenia    Benign neoplasm of meninges   [2]   Current Outpatient Medications:     albuterol (PROVENTIL/VENTOLIN HFA) 90 mcg/actuation inhaler, Inhale 1-2 puffs into the lungs every 6 (six) hours as needed for Wheezing or Shortness of Breath., Disp: 60 g, Rfl: 1    amiodarone (PACERONE) 100 MG Tab, Take 1 tablet (100 mg total) by mouth once daily., Disp: 90 tablet, Rfl: 0    amLODIPine (NORVASC) 5 MG tablet, Take 1 tablet (5 mg total) by mouth once daily., Disp: 90 tablet, Rfl: 3    apixaban (ELIQUIS) 5 mg Tab, Take 1 tablet (5 mg total) by mouth 2 (two) times daily., Disp: 180 tablet, Rfl: 1    atenoloL (TENORMIN) 25 MG tablet, Take 1 " tablet (25 mg total) by mouth once daily., Disp: 90 tablet, Rfl: 1    blood sugar diagnostic Strp, To check BG one times daily, to use with insurance preferred meter, Disp: 100 each, Rfl: 11    blood-glucose meter kit, To check BG one times daily, to use with insurance preferred meter, Disp: 1 each, Rfl: 0    fish-flx-prm-blkbor-om 3,6,9 5 400-400-200 mg Cap, Take 1 tablet by mouth once daily., Disp: , Rfl:     furosemide (LASIX) 40 MG tablet, Take 1 tablet (40 mg total) by mouth daily as needed., Disp: 90 tablet, Rfl: 1    lancets Misc, To check BG one times daily, to use with insurance preferred meter, Disp: 100 each, Rfl: 11    lancing device (TRUEDRAW LANCING DEVICE) Misc, 1 Lancet by Misc.(Non-Drug; Combo Route) route once daily., Disp: 100 each, Rfl: 11    lisinopriL-hydrochlorothiazide (PRINZIDE,ZESTORETIC) 20-12.5 mg per tablet, Take 2 tablets by mouth once daily., Disp: 180 tablet, Rfl: 1    loratadine (CLARITIN REDITABS) 10 mg dissolvable tablet, Take 1 tablet by mouth daily as needed., Disp: , Rfl:     metFORMIN (GLUCOPHAGE) 500 MG tablet, Take 1 tablet (500 mg total) by mouth 2 (two) times daily with meals., Disp: 90 tablet, Rfl: 3    methylPREDNISolone (MEDROL DOSEPACK) 4 mg tablet, follow package directions, Disp: 21 tablet, Rfl: 0    simvastatin (ZOCOR) 20 MG tablet, Take 1 tablet (20 mg total) by mouth every evening., Disp: 90 tablet, Rfl: 3    traMADoL (ULTRAM) 50 mg tablet, TAKE 1 TABLET BY MOUTH EVERY 8 HOURS AS NEEDED FOR MILD PAIN FOR 7 DAYS, Disp: , Rfl:     TRUE METRIX AIR GLUCOSE METER [facility], , Disp: , Rfl:     TRUEPLUS LANCETS 33 gauge Misc, , Disp: , Rfl:

## 2025-02-26 ENCOUNTER — RESULTS FOLLOW-UP (OUTPATIENT)
Dept: INTERNAL MEDICINE | Facility: CLINIC | Age: 80
End: 2025-02-26
Payer: MEDICARE

## 2025-02-27 NOTE — ASSESSMENT & PLAN NOTE
Latanya, with [employer], is calling stating that they need an updated work status faxed to her at [phone].    Stable. BP on the low end today. Continue current medications. Monitor BP at home.

## 2025-02-27 NOTE — ASSESSMENT & PLAN NOTE
Rate controlled today in clinic. Continue amiodarone, Eliquis, and atenolol. Followed by cardiology

## 2025-03-06 DIAGNOSIS — E11.69 TYPE 2 DIABETES MELLITUS WITH OTHER SPECIFIED COMPLICATION, WITHOUT LONG-TERM CURRENT USE OF INSULIN: ICD-10-CM

## 2025-03-06 RX ORDER — METFORMIN HYDROCHLORIDE 500 MG/1
500 TABLET ORAL 2 TIMES DAILY WITH MEALS
Qty: 90 TABLET | Refills: 3 | Status: SHIPPED | OUTPATIENT
Start: 2025-03-06

## 2025-03-06 NOTE — TELEPHONE ENCOUNTER
No care due was identified.  [facility] Embedded Care Due Messages. Reference number: [id number].   3/06/2025 10:15:11 AM CST

## 2025-04-07 DIAGNOSIS — I48.0 PAROXYSMAL ATRIAL FIBRILLATION: ICD-10-CM

## 2025-04-07 RX ORDER — AMIODARONE HYDROCHLORIDE 100 MG/1
100 TABLET ORAL DAILY
Qty: 90 TABLET | Refills: 1 | Status: SHIPPED | OUTPATIENT
Start: 2025-04-07 | End: 2025-10-04

## 2025-05-27 ENCOUNTER — LAB VISIT (OUTPATIENT)
Dept: LAB | Facility: HOSPITAL | Age: 80
End: 2025-05-27
Attending: NURSE PRACTITIONER
Payer: MEDICARE

## 2025-05-27 ENCOUNTER — OFFICE VISIT (OUTPATIENT)
Dept: INTERNAL MEDICINE | Facility: CLINIC | Age: 80
End: 2025-05-27
Payer: MEDICARE

## 2025-05-27 VITALS
WEIGHT: 153 LBS | OXYGEN SATURATION: 96 % | DIASTOLIC BLOOD PRESSURE: 60 MMHG | SYSTOLIC BLOOD PRESSURE: 100 MMHG | HEIGHT: 58 IN | HEART RATE: 57 BPM | RESPIRATION RATE: 18 BRPM | BODY MASS INDEX: 32.12 KG/M2 | TEMPERATURE: 98 F

## 2025-05-27 DIAGNOSIS — E66.811 CLASS 1 OBESITY DUE TO EXCESS CALORIES WITH SERIOUS COMORBIDITY AND BODY MASS INDEX (BMI) OF 31.0 TO 31.9 IN ADULT: ICD-10-CM

## 2025-05-27 DIAGNOSIS — I10 ESSENTIAL HYPERTENSION: Primary | ICD-10-CM

## 2025-05-27 DIAGNOSIS — E11.69 TYPE 2 DIABETES MELLITUS WITH OTHER SPECIFIED COMPLICATION, UNSPECIFIED WHETHER LONG TERM INSULIN USE: ICD-10-CM

## 2025-05-27 DIAGNOSIS — I48.0 PAROXYSMAL ATRIAL FIBRILLATION: ICD-10-CM

## 2025-05-27 DIAGNOSIS — D32.9 BENIGN NEOPLASM OF MENINGES: ICD-10-CM

## 2025-05-27 DIAGNOSIS — E78.2 MIXED HYPERLIPIDEMIA: ICD-10-CM

## 2025-05-27 DIAGNOSIS — E66.09 CLASS 1 OBESITY DUE TO EXCESS CALORIES WITH SERIOUS COMORBIDITY AND BODY MASS INDEX (BMI) OF 31.0 TO 31.9 IN ADULT: ICD-10-CM

## 2025-05-27 DIAGNOSIS — E11.69 TYPE 2 DIABETES MELLITUS WITH OTHER SPECIFIED COMPLICATION, WITHOUT LONG-TERM CURRENT USE OF INSULIN: ICD-10-CM

## 2025-05-27 LAB
ABSOLUTE EOSINOPHIL (OHS): 0.14 K/UL
ABSOLUTE MONOCYTE (OHS): 0.46 K/UL (ref 0.3–1)
ABSOLUTE NEUTROPHIL COUNT (OHS): 3.11 K/UL (ref 1.8–7.7)
ALBUMIN SERPL BCP-MCNC: 3.6 G/DL (ref 3.5–5.2)
ALP SERPL-CCNC: 66 UNIT/L (ref 40–150)
ALT SERPL W/O P-5'-P-CCNC: 13 UNIT/L (ref 10–44)
ANION GAP (OHS): 11 MMOL/L (ref 8–16)
AST SERPL-CCNC: 15 UNIT/L (ref 11–45)
BASOPHILS # BLD AUTO: 0.02 K/UL
BASOPHILS NFR BLD AUTO: 0.4 %
BILIRUB SERPL-MCNC: 0.9 MG/DL (ref 0.1–1)
BUN SERPL-MCNC: 22 MG/DL (ref 8–23)
CALCIUM SERPL-MCNC: 9.4 MG/DL (ref 8.7–10.5)
CHLORIDE SERPL-SCNC: 108 MMOL/L (ref 95–110)
CO2 SERPL-SCNC: 25 MMOL/L (ref 23–29)
CREAT SERPL-MCNC: 1.2 MG/DL (ref 0.5–1.4)
EAG (OHS): 103 MG/DL (ref 68–131)
ERYTHROCYTE [DISTWIDTH] IN BLOOD BY AUTOMATED COUNT: 13.3 % (ref 11.5–14.5)
GFR SERPLBLD CREATININE-BSD FMLA CKD-EPI: 46 ML/MIN/1.73/M2
GLUCOSE SERPL-MCNC: 88 MG/DL (ref 70–110)
HBA1C MFR BLD: 5.2 % (ref 4–5.6)
HCT VFR BLD AUTO: 38.4 % (ref 37–48.5)
HGB BLD-MCNC: 12.5 GM/DL (ref 12–16)
IMM GRANULOCYTES # BLD AUTO: 0.02 K/UL (ref 0–0.04)
IMM GRANULOCYTES NFR BLD AUTO: 0.4 % (ref 0–0.5)
LYMPHOCYTES # BLD AUTO: 1.29 K/UL (ref 1–4.8)
MCH RBC QN AUTO: 28.7 PG (ref 27–31)
MCHC RBC AUTO-ENTMCNC: 32.6 G/DL (ref 32–36)
MCV RBC AUTO: 88 FL (ref 82–98)
NUCLEATED RBC (/100WBC) (OHS): 0 /100 WBC
PLATELET # BLD AUTO: 243 K/UL (ref 150–450)
PMV BLD AUTO: 9 FL (ref 9.2–12.9)
POTASSIUM SERPL-SCNC: 4.8 MMOL/L (ref 3.5–5.1)
PROT SERPL-MCNC: 6.8 GM/DL (ref 6–8.4)
RBC # BLD AUTO: 4.36 M/UL (ref 4–5.4)
RELATIVE EOSINOPHIL (OHS): 2.8 %
RELATIVE LYMPHOCYTE (OHS): 25.6 % (ref 18–48)
RELATIVE MONOCYTE (OHS): 9.1 % (ref 4–15)
RELATIVE NEUTROPHIL (OHS): 61.7 % (ref 38–73)
SODIUM SERPL-SCNC: 144 MMOL/L (ref 136–145)
WBC # BLD AUTO: 5.04 K/UL (ref 3.9–12.7)

## 2025-05-27 PROCEDURE — 99999 PR PBB SHADOW E&M-EST. PATIENT-LVL V: CPT | Mod: PBBFAC,,, | Performed by: NURSE PRACTITIONER

## 2025-05-27 PROCEDURE — 3078F DIAST BP <80 MM HG: CPT | Mod: CPTII,S$GLB,, | Performed by: NURSE PRACTITIONER

## 2025-05-27 PROCEDURE — 99214 OFFICE O/P EST MOD 30 MIN: CPT | Mod: S$GLB,,, | Performed by: NURSE PRACTITIONER

## 2025-05-27 PROCEDURE — 1101F PT FALLS ASSESS-DOCD LE1/YR: CPT | Mod: CPTII,S$GLB,, | Performed by: NURSE PRACTITIONER

## 2025-05-27 PROCEDURE — 84075 ASSAY ALKALINE PHOSPHATASE: CPT | Mod: PO

## 2025-05-27 PROCEDURE — 3288F FALL RISK ASSESSMENT DOCD: CPT | Mod: CPTII,S$GLB,, | Performed by: NURSE PRACTITIONER

## 2025-05-27 PROCEDURE — G2211 COMPLEX E/M VISIT ADD ON: HCPCS | Mod: S$GLB,,, | Performed by: NURSE PRACTITIONER

## 2025-05-27 PROCEDURE — 85025 COMPLETE CBC W/AUTO DIFF WBC: CPT | Mod: PO

## 2025-05-27 PROCEDURE — 1159F MED LIST DOCD IN RCRD: CPT | Mod: CPTII,S$GLB,, | Performed by: NURSE PRACTITIONER

## 2025-05-27 PROCEDURE — 83036 HEMOGLOBIN GLYCOSYLATED A1C: CPT

## 2025-05-27 PROCEDURE — 1160F RVW MEDS BY RX/DR IN RCRD: CPT | Mod: CPTII,S$GLB,, | Performed by: NURSE PRACTITIONER

## 2025-05-27 PROCEDURE — 36415 COLL VENOUS BLD VENIPUNCTURE: CPT | Mod: PO

## 2025-05-27 PROCEDURE — 1126F AMNT PAIN NOTED NONE PRSNT: CPT | Mod: CPTII,S$GLB,, | Performed by: NURSE PRACTITIONER

## 2025-05-27 PROCEDURE — 3074F SYST BP LT 130 MM HG: CPT | Mod: CPTII,S$GLB,, | Performed by: NURSE PRACTITIONER

## 2025-05-27 RX ORDER — METFORMIN HYDROCHLORIDE 500 MG/1
500 TABLET ORAL
Start: 2025-05-27

## 2025-05-27 NOTE — PROGRESS NOTES
"Subjective:       Patient ID: Guillermina Person is a 80 y.o. female.    Chief Complaint: Follow-up and Diabetes    History of Present Illness             Mrs. Person returns to clinic with her  for DM/HTN follow up. She has been having bilateral knee pain and taking Arthritis Tylenol as needed which provides relief. Overall reports she is doing well, except fatigue. Blood pressure is low today in clinic. She was started on amlodipine in 2024. She has not been monitoring blood pressure at home. She does not she has been experiencing intermittent dizziness/lightheadedness.    HPI    Problem List[1]    No family history on file.  Past Surgical History:   Procedure Laterality Date    ABDOMINAL SURGERY      APPENDECTOMY      BRAIN SURGERY       SECTION      HYSTERECTOMY      OOPHORECTOMY      TONSILLECTOMY         Current Medications[2]    Review of Systems   Constitutional:  Positive for fatigue. Negative for chills and fever.   Respiratory:  Negative for chest tightness, shortness of breath and wheezing.    Cardiovascular:  Negative for chest pain, palpitations and leg swelling.   Gastrointestinal:  Negative for abdominal pain, constipation, nausea and vomiting.   Genitourinary:  Negative for difficulty urinating.   Musculoskeletal:  Positive for arthralgias. Negative for myalgias.   Skin:  Negative for color change, rash and wound.   Neurological:  Positive for dizziness and light-headedness. Negative for tremors, syncope, facial asymmetry, speech difficulty, weakness, numbness and headaches.   Psychiatric/Behavioral:  Negative for confusion and dysphoric mood. The patient is not nervous/anxious.        Objective:   /60 (BP Location: Left arm, Patient Position: Sitting)   Pulse (!) 57   Temp 97.9 °F (36.6 °C) (Tympanic)   Resp 18   Ht 4' 10" (1.473 m)   Wt 69.4 kg (153 lb)   SpO2 96%   BMI 31.98 kg/m²      Physical Exam  Constitutional:       General: She is not in acute distress.     " Appearance: Normal appearance. She is not ill-appearing.   HENT:      Head: Normocephalic.   Eyes:      Extraocular Movements: Extraocular movements intact.      Conjunctiva/sclera: Conjunctivae normal.   Cardiovascular:      Rate and Rhythm: Bradycardia present. Rhythm irregular.      Heart sounds: Murmur heard.   Pulmonary:      Effort: Pulmonary effort is normal. No respiratory distress.      Breath sounds: Normal breath sounds. No wheezing, rhonchi or rales.   Musculoskeletal:      Comments: Ambulating with quad cane   Skin:     General: Skin is warm and dry.      Coloration: Skin is not pale.      Findings: No erythema or rash.   Neurological:      Mental Status: She is alert and oriented to person, place, and time.      Coordination: Coordination normal.      Gait: Gait normal.   Psychiatric:         Mood and Affect: Mood normal.         Behavior: Behavior normal.         Assessment & Plan     Assessment & Plan               1. Essential hypertension  Assessment & Plan:  Stable. BP on the low today and symptoms. Will have her hold amlodipine at this time. Monitor BP at home. If needed, will start amlodipine 2.5 mg.      2. Paroxysmal atrial fibrillation  Assessment & Plan:   Continue amiodarone, Eliquis, and atenolol. Followed by cardiology    Orders:  -     CBC Auto Differential; Future; Expected date: 05/27/2025  -     Comprehensive Metabolic Panel; Future; Expected date: 05/27/2025    3. Type 2 diabetes mellitus with other specified complication, without long-term current use of insulin  Assessment & Plan:  Lab Results   Component Value Date    HGBA1C 5.2 05/27/2025     Stable/well controlled. Continue metformin.    Orders:  -     Comprehensive Metabolic Panel; Future; Expected date: 05/27/2025  -     HEMOGLOBIN A1C; Future; Expected date: 05/27/2025  -     metFORMIN (GLUCOPHAGE) 500 MG tablet; Take 1 tablet (500 mg total) by mouth daily with breakfast.    4. Mixed hyperlipidemia  Assessment & Plan:  Lab  "Results   Component Value Date    CHOL 183 02/25/2025    CHOL 160 11/17/2023    CHOL 178 02/27/2023      Lab Results   Component Value Date    HDL 69 02/25/2025    HDL 63 11/17/2023    HDL 68 02/27/2023     Lab Results   Component Value Date    LDLCALC 100.0 02/25/2025    LDLCALC 83.0 11/17/2023    LDLCALC 94 02/27/2023      Lab Results   Component Value Date    TRIG 70 02/25/2025    TRIG 70 11/17/2023    TRIG 88 02/27/2023     Lab Results   Component Value Date    TOTALCHOLEST 2.7 02/25/2025    TOTALCHOLEST 2.5 11/17/2023     Lab Results   Component Value Date    NONHDLCHOL 114 02/25/2025    NONHDLCHOL 97 11/17/2023    NONHDLCHOL 127 02/24/2020     Lab Results   Component Value Date    CHOLHDL 37.7 02/25/2025    CHOLHDL 39.4 11/17/2023     Stable. Continue simvastatin.      5. Class 1 obesity due to excess calories with serious comorbidity and body mass index (BMI) of 31.0 to 31.9 in adult  Assessment & Plan:  Counseled on importance of diet and exercise. Encouraged patient to have an active lifestyle with regular exercise with healthy eating.       6. Benign neoplasm of meninges  Overview:  MRI brain 2015: "Right frontal region dural based extra-axial mass demonstrating enhancement. Suspect this may reflect an atypical meningioma. "    S/p meningioma resection right frontal convexity. The surgeon is Aleksandar Calderon MD on 8-19-15         Visit today included increased complexity associated with the care of the episodic problem addressed and managing the longitudinal care of the patient due to the serious and/or complex managed problem(s).        JAY JAY Yanez    This note was generated with the assistance of ambient listening technology. Verbal consent was obtained by the patient and accompanying visitor(s) for the recording of patient appointment to facilitate this note. I attest to having reviewed and edited the generated note for accuracy, though some syntax or spelling errors may persist. Please " "contact the author of this note for any clarification.       Portions of this note may have been created with voice recognition software. Occasional "wrong-word" or "sound-a-like" substitutions may have occurred due to the inherent limitations of voice recognition software. Please, read the note carefully and recognize, using context, where substitutions have occurred.             [1]   Patient Active Problem List  Diagnosis    Essential hypertension    History of uterine cancer    Left bundle branch block    Paroxysmal atrial fibrillation    Primary osteoarthritis of both knees    Type 2 diabetes mellitus with other specified complication    Class 1 obesity due to excess calories with serious comorbidity and body mass index (BMI) of 31.0 to 31.9 in adult    Mixed hyperlipidemia    Osteopenia    Benign neoplasm of meninges   [2]   Current Outpatient Medications:     albuterol (PROVENTIL/VENTOLIN HFA) 90 mcg/actuation inhaler, Inhale 1-2 puffs into the lungs every 6 (six) hours as needed for Wheezing or Shortness of Breath., Disp: 60 g, Rfl: 1    amiodarone (PACERONE) 100 MG Tab, Take 1 tablet (100 mg total) by mouth once daily., Disp: 90 tablet, Rfl: 1    amLODIPine (NORVASC) 5 MG tablet, Take 1 tablet (5 mg total) by mouth once daily., Disp: 90 tablet, Rfl: 3    apixaban (ELIQUIS) 5 mg Tab, Take 1 tablet (5 mg total) by mouth 2 (two) times daily., Disp: 180 tablet, Rfl: 1    atenoloL (TENORMIN) 25 MG tablet, Take 1 tablet (25 mg total) by mouth once daily., Disp: 90 tablet, Rfl: 1    blood sugar diagnostic Strp, To check BG one times daily, to use with insurance preferred meter, Disp: 100 each, Rfl: 11    fish-flx-prm-blkbor-om 3,6,9 5 400-400-200 mg Cap, Take 1 tablet by mouth once daily., Disp: , Rfl:     furosemide (LASIX) 40 MG tablet, Take 1 tablet (40 mg total) by mouth daily as needed., Disp: 90 tablet, Rfl: 1    lancets Misc, To check BG one times daily, to use with insurance preferred meter, Disp: 100 " each, Rfl: 11    lancing device (TRUEDRAW LANCING DEVICE) Misc, 1 Lancet by Misc.(Non-Drug; Combo Route) route once daily., Disp: 100 each, Rfl: 11    lisinopriL-hydrochlorothiazide (PRINZIDE,ZESTORETIC) 20-12.5 mg per tablet, Take 2 tablets by mouth once daily., Disp: 180 tablet, Rfl: 1    loratadine (CLARITIN REDITABS) 10 mg dissolvable tablet, Take 1 tablet by mouth daily as needed., Disp: , Rfl:     methylPREDNISolone (MEDROL DOSEPACK) 4 mg tablet, follow package directions, Disp: 21 tablet, Rfl: 0    traMADoL (ULTRAM) 50 mg tablet, TAKE 1 TABLET BY MOUTH EVERY 8 HOURS AS NEEDED FOR MILD PAIN FOR 7 DAYS, Disp: , Rfl:     TRUE METRIX AIR GLUCOSE METER [facility], , Disp: , Rfl:     TRUEPLUS LANCETS 33 gauge Misc, , Disp: , Rfl:     metFORMIN (GLUCOPHAGE) 500 MG tablet, Take 1 tablet (500 mg total) by mouth daily with breakfast., Disp: , Rfl:     simvastatin (ZOCOR) 20 MG tablet, Take 1 tablet (20 mg total) by mouth every evening., Disp: 90 tablet, Rfl: 3

## 2025-05-28 ENCOUNTER — RESULTS FOLLOW-UP (OUTPATIENT)
Dept: INTERNAL MEDICINE | Facility: CLINIC | Age: 80
End: 2025-05-28

## 2025-05-29 NOTE — ASSESSMENT & PLAN NOTE
Stable. BP on the low today and symptoms. Will have her hold amlodipine at this time. Monitor BP at home. If needed, will start amlodipine 2.5 mg.

## 2025-05-29 NOTE — ASSESSMENT & PLAN NOTE
Lab Results   Component Value Date    CHOL 183 02/25/2025    CHOL 160 11/17/2023    CHOL 178 02/27/2023      Lab Results   Component Value Date    HDL 69 02/25/2025    HDL 63 11/17/2023    HDL 68 02/27/2023     Lab Results   Component Value Date    LDLCALC 100.0 02/25/2025    LDLCALC 83.0 11/17/2023    LDLCALC 94 02/27/2023      Lab Results   Component Value Date    TRIG 70 02/25/2025    TRIG 70 11/17/2023    TRIG 88 02/27/2023     Lab Results   Component Value Date    TOTALCHOLEST 2.7 02/25/2025    TOTALCHOLEST 2.5 11/17/2023     Lab Results   Component Value Date    NONHDLCHOL 114 02/25/2025    NONHDLCHOL 97 11/17/2023    NONHDLCHOL 127 02/24/2020     Lab Results   Component Value Date    CHOLHDL 37.7 02/25/2025    CHOLHDL 39.4 11/17/2023     Stable. Continue simvastatin.

## 2025-05-29 NOTE — ASSESSMENT & PLAN NOTE
Lab Results   Component Value Date    HGBA1C 5.2 05/27/2025     Stable/well controlled. Continue metformin.

## 2025-07-03 DIAGNOSIS — E78.2 MIXED HYPERLIPIDEMIA: ICD-10-CM

## 2025-07-03 DIAGNOSIS — I48.0 PAROXYSMAL ATRIAL FIBRILLATION: ICD-10-CM

## 2025-07-03 DIAGNOSIS — E11.69 TYPE 2 DIABETES MELLITUS WITH OTHER SPECIFIED COMPLICATION, WITHOUT LONG-TERM CURRENT USE OF INSULIN: ICD-10-CM

## 2025-07-03 DIAGNOSIS — I10 ESSENTIAL HYPERTENSION: ICD-10-CM

## 2025-07-03 RX ORDER — METFORMIN HYDROCHLORIDE 500 MG/1
500 TABLET ORAL
Qty: 90 TABLET | Refills: 0 | Status: SHIPPED | OUTPATIENT
Start: 2025-07-03

## 2025-07-03 RX ORDER — AMLODIPINE BESYLATE 5 MG/1
5 TABLET ORAL DAILY
Qty: 90 TABLET | Refills: 0 | Status: SHIPPED | OUTPATIENT
Start: 2025-07-03 | End: 2026-07-03

## 2025-07-03 RX ORDER — ATENOLOL 25 MG/1
25 TABLET ORAL DAILY
Qty: 90 TABLET | Refills: 0 | Status: SHIPPED | OUTPATIENT
Start: 2025-07-03 | End: 2025-12-30

## 2025-07-03 RX ORDER — SIMVASTATIN 20 MG/1
20 TABLET, FILM COATED ORAL NIGHTLY
Qty: 90 TABLET | Refills: 0 | Status: SHIPPED | OUTPATIENT
Start: 2025-07-03 | End: 2026-07-03

## 2025-07-03 RX ORDER — AMIODARONE HYDROCHLORIDE 100 MG/1
100 TABLET ORAL DAILY
Qty: 90 TABLET | Refills: 0 | Status: SHIPPED | OUTPATIENT
Start: 2025-07-03 | End: 2025-12-30

## 2025-07-03 NOTE — TELEPHONE ENCOUNTER
No care due was identified.  [facility] Embedded Care Due Messages. Reference number: [id number].   7/03/2025 10:12:54 AM CDT

## 2025-07-16 DIAGNOSIS — E78.2 MIXED HYPERLIPIDEMIA: ICD-10-CM

## 2025-07-16 DIAGNOSIS — E11.69 TYPE 2 DIABETES MELLITUS WITH OTHER SPECIFIED COMPLICATION, WITHOUT LONG-TERM CURRENT USE OF INSULIN: ICD-10-CM

## 2025-07-16 DIAGNOSIS — I48.0 PAROXYSMAL ATRIAL FIBRILLATION: ICD-10-CM

## 2025-07-16 DIAGNOSIS — I10 ESSENTIAL HYPERTENSION: ICD-10-CM

## 2025-07-16 RX ORDER — AMIODARONE HYDROCHLORIDE 100 MG/1
100 TABLET ORAL DAILY
Qty: 90 TABLET | Refills: 1 | Status: SHIPPED | OUTPATIENT
Start: 2025-07-16 | End: 2026-01-12

## 2025-07-16 RX ORDER — ATENOLOL 25 MG/1
25 TABLET ORAL DAILY
Qty: 90 TABLET | Refills: 1 | Status: SHIPPED | OUTPATIENT
Start: 2025-07-16 | End: 2026-01-12

## 2025-07-16 RX ORDER — LISINOPRIL AND HYDROCHLOROTHIAZIDE 12.5; 2 MG/1; MG/1
2 TABLET ORAL DAILY
Qty: 180 TABLET | Refills: 1 | Status: SHIPPED | OUTPATIENT
Start: 2025-07-16 | End: 2026-01-12

## 2025-07-16 RX ORDER — SIMVASTATIN 20 MG/1
20 TABLET, FILM COATED ORAL NIGHTLY
Qty: 90 TABLET | Refills: 1 | Status: SHIPPED | OUTPATIENT
Start: 2025-07-16 | End: 2026-07-16

## 2025-07-16 RX ORDER — METFORMIN HYDROCHLORIDE 500 MG/1
500 TABLET ORAL
Qty: 90 TABLET | Refills: 1 | Status: SHIPPED | OUTPATIENT
Start: 2025-07-16

## 2025-07-16 RX ORDER — AMLODIPINE BESYLATE 5 MG/1
5 TABLET ORAL DAILY
Qty: 90 TABLET | Refills: 1 | Status: SHIPPED | OUTPATIENT
Start: 2025-07-16 | End: 2026-07-16

## 2025-07-16 NOTE — TELEPHONE ENCOUNTER
No care due was identified.  [facility] Embedded Care Due Messages. Reference number: [id number].   7/16/2025 9:08:53 AM CDT